# Patient Record
Sex: FEMALE | Race: WHITE | NOT HISPANIC OR LATINO | Employment: UNEMPLOYED | URBAN - METROPOLITAN AREA
[De-identification: names, ages, dates, MRNs, and addresses within clinical notes are randomized per-mention and may not be internally consistent; named-entity substitution may affect disease eponyms.]

---

## 2017-04-30 ENCOUNTER — HOSPITAL ENCOUNTER (INPATIENT)
Age: 67
LOS: 17 days | Discharge: HOME | DRG: 176 | End: 2017-05-17
Attending: EMERGENCY MEDICINE | Admitting: INTERNAL MEDICINE

## 2017-04-30 ENCOUNTER — APPOINTMENT (OUTPATIENT)
Dept: ULTRASOUND IMAGING | Age: 67
DRG: 176 | End: 2017-04-30
Attending: HOSPITALIST

## 2017-04-30 ENCOUNTER — APPOINTMENT (OUTPATIENT)
Dept: CV DIAGNOSTICS | Age: 67
DRG: 176 | End: 2017-04-30
Attending: EMERGENCY MEDICINE

## 2017-04-30 ENCOUNTER — APPOINTMENT (OUTPATIENT)
Dept: CT IMAGING | Age: 67
DRG: 176 | End: 2017-04-30
Attending: EMERGENCY MEDICINE

## 2017-04-30 DIAGNOSIS — I26.99 OTHER PULMONARY EMBOLISM WITHOUT ACUTE COR PULMONALE, UNSPECIFIED CHRONICITY (CMD): Primary | ICD-10-CM

## 2017-04-30 DIAGNOSIS — Z79.01 ANTICOAGULANT LONG-TERM USE: ICD-10-CM

## 2017-04-30 DIAGNOSIS — I27.20 PULMONARY HYPERTENSION (CMD): ICD-10-CM

## 2017-04-30 DIAGNOSIS — R06.02 SOB (SHORTNESS OF BREATH): ICD-10-CM

## 2017-04-30 DIAGNOSIS — E87.20 LACTIC ACIDOSIS: ICD-10-CM

## 2017-04-30 DIAGNOSIS — D64.9 CHRONIC ANEMIA: ICD-10-CM

## 2017-04-30 DIAGNOSIS — I26.09 OTHER ACUTE PULMONARY EMBOLISM WITH ACUTE COR PULMONALE (CMD): ICD-10-CM

## 2017-04-30 DIAGNOSIS — I27.24 CTEPH (CHRONIC THROMBOEMBOLIC PULMONARY HYPERTENSION) (CMD): ICD-10-CM

## 2017-04-30 DIAGNOSIS — R55 SYNCOPE, UNSPECIFIED SYNCOPE TYPE: ICD-10-CM

## 2017-04-30 DIAGNOSIS — D64.9 ANEMIA, UNSPECIFIED TYPE: ICD-10-CM

## 2017-04-30 DIAGNOSIS — I51.7 ENLARGED RV (RIGHT VENTRICLE): ICD-10-CM

## 2017-04-30 DIAGNOSIS — F32.A ANXIETY AND DEPRESSION: ICD-10-CM

## 2017-04-30 DIAGNOSIS — I26.09 OTHER PULMONARY EMBOLISM WITH ACUTE COR PULMONALE (CMD): ICD-10-CM

## 2017-04-30 DIAGNOSIS — Z87.09 HISTORY OF COPD: ICD-10-CM

## 2017-04-30 DIAGNOSIS — F41.9 ANXIETY AND DEPRESSION: ICD-10-CM

## 2017-04-30 DIAGNOSIS — D50.9 IRON DEFICIENCY ANEMIA, UNSPECIFIED IRON DEFICIENCY ANEMIA TYPE: ICD-10-CM

## 2017-04-30 LAB
ALBUMIN SERPL-MCNC: 3.1 G/DL (ref 3.6–5.1)
ALBUMIN/GLOB SERPL: 0.9 {RATIO} (ref 1–2.4)
ALP SERPL-CCNC: 75 UNITS/L (ref 45–117)
ALT SERPL-CCNC: 174 UNITS/L
ANION GAP BLD CALC-SCNC: 18 MMOL/L
ANION GAP SERPL CALC-SCNC: 15 MMOL/L (ref 10–20)
APTT PPP: 157 SEC (ref 22–30)
APTT PPP: <21 SEC (ref 22–30)
AST SERPL-CCNC: 112 UNITS/L
BASE EXCESS BLDV CALC-SCNC: 2 MMOL/L (ref 0–2)
BASOPHILS # BLD AUTO: 0 K/MCL (ref 0–0.3)
BASOPHILS NFR BLD AUTO: 0 %
BILIRUB SERPL-MCNC: 1.8 MG/DL (ref 0.2–1)
BNP SERPL-MCNC: 98 PG/ML
BUN BLD-MCNC: 21 MG/DL (ref 6–20)
BUN SERPL-MCNC: 20 MG/DL (ref 6–20)
BUN/CREAT SERPL: 22 (ref 7–25)
CA-I BLD ISE-SCNC: 1.07 MMOL/L (ref 1.15–1.29)
CALCIUM SERPL-MCNC: 8.2 MG/DL (ref 8.4–10.2)
CHLORIDE BLD-SCNC: 102 MMOL/L (ref 98–107)
CHLORIDE SERPL-SCNC: 103 MMOL/L (ref 98–107)
CHOLEST SERPL-MCNC: 118 MG/DL
CHOLEST/HDLC SERPL: 1.5 {RATIO}
CO2 BLD-SCNC: 23 MMOL/L (ref 19–24)
CO2 SERPL-SCNC: 26 MMOL/L (ref 21–32)
CREAT BLD-MCNC: 0.9 MG/DL (ref 0.51–0.95)
CREAT SERPL-MCNC: 0.92 MG/DL (ref 0.51–0.95)
CROSSMATCH EXPIRE: NORMAL
DIFFERENTIAL METHOD BLD: ABNORMAL
EOSINOPHIL # BLD AUTO: 0.1 K/MCL (ref 0.1–0.5)
EOSINOPHIL NFR SPEC: 1 %
ERYTHROCYTE [DISTWIDTH] IN BLOOD: 14.8 % (ref 11–15)
EST RIGHT VENT SYSTOLIC PRESSURE BY TRICUSPID REGURGITATION JET (RVSP): 83 MMHG
FERRITIN SERPL-MCNC: 19 NG/ML (ref 8–252)
FOLATE SERPL-MCNC: >24 NG/ML
GLOBULIN SER-MCNC: 3.6 G/DL (ref 2–4)
GLUCOSE BLD-MCNC: 151 MG/DL (ref 65–99)
GLUCOSE SERPL-MCNC: 157 MG/DL (ref 65–99)
HCO3 BLDV-SCNC: 25 MMOL/L (ref 22–28)
HCT VFR BLD CALC: 31.5 % (ref 36–46.5)
HCT VFR BLD CALC: 32 % (ref 36–46.5)
HDLC SERPL-MCNC: 80 MG/DL
HGB BLD-MCNC: 10.9 G/DL (ref 12–15.5)
HGB BLD-MCNC: 9.6 G/DL (ref 12–15.5)
IMM RETICS NFR: 32.8 % (ref 1.5–16)
INR PPP: 1
IRON SATN MFR SERPL: 5 % (ref 15–45)
IRON SERPL-MCNC: 18 MCG/DL (ref 50–170)
LACTATE BLD-MCNC: 3.9 MMOL/L
LACTATE SERPL-SCNC: 3.1 MMOL/L (ref 0–2)
LDLC SERPL-MCNC: 16 MG/DL
LYMPHOCYTES # BLD MANUAL: 2.6 K/MCL (ref 1–4)
LYMPHOCYTES NFR BLD MANUAL: 23 %
MCH RBC QN AUTO: 24.7 PG (ref 26–34)
MCHC RBC AUTO-ENTMCNC: 30.5 G/DL (ref 32–36.5)
MCV RBC AUTO: 81 FL (ref 78–100)
MONOCYTES # BLD MANUAL: 0.4 K/MCL (ref 0.3–0.9)
MONOCYTES NFR BLD MANUAL: 3 %
NEUTROPHILS # BLD AUTO: 8.4 K/MCL (ref 1.8–7.7)
NEUTROPHILS NFR BLD AUTO: 73 %
NONHDLC SERPL-MCNC: 38 MG/DL
PCO2 BLDV: 31 MM HG (ref 38–51)
PH BLDV: 7.5 UNITS (ref 7.35–7.45)
PLATELET # BLD: 264 K/MCL (ref 140–450)
PO2 BLDV: 14 MM HG (ref 35–42)
POTASSIUM BLD-SCNC: 3.9 MMOL/L (ref 3.4–5.1)
POTASSIUM SERPL-SCNC: 3.8 MMOL/L (ref 3.4–5.1)
PROT SERPL-MCNC: 6.7 G/DL (ref 6.4–8.2)
PROTHROMBIN TIME: 11.1 SEC (ref 9.7–11.8)
RBC # BLD: 3.89 MIL/MCL (ref 4–5.2)
RETICS #: 85 K/MCL (ref 10–120)
RETICS/RBC NFR AUTO: 2.3 % (ref 0.3–2.5)
RV END SYSTOLIC LONGITUDINAL STRAIN FREE WALL (RVGS): -8.3 %
RV TISSUE DOPPLER FREE WALL HEART RATE (RVSTV): 0.1 M/S
SAO2 % BLDV: 22 % (ref 60–80)
SODIUM BLD-SCNC: 139 MMOL/L (ref 135–145)
SODIUM SERPL-SCNC: 140 MMOL/L (ref 135–145)
TIBC SERPL-MCNC: 395 MCG/DL (ref 250–450)
TRICUSPID VALVE PEAK REGURGITATION VELOCITY (TRPV): 4.1 M/S
TRIGL SERPL-MCNC: 111 MG/DL
TROPONIN I BLD-MCNC: <0.1 NG/ML
TROPONIN I SERPL-MCNC: 0.09 NG/ML
TROPONIN I SERPL-MCNC: 0.32 NG/ML
TSH SERPL-ACNC: 3.15 MCUNITS/ML (ref 0.35–5)
TV ESTIMATED RIGHT ARTERIAL PRESSURE (RAP): 15 MMHG
VIT B12 SERPL-MCNC: 589 PG/ML (ref 211–911)
WBC # BLD: 11.4 K/MCL (ref 4.2–11)

## 2017-04-30 PROCEDURE — 93325 DOPPLER ECHO COLOR FLOW MAPG: CPT | Performed by: INTERNAL MEDICINE

## 2017-04-30 PROCEDURE — 10002805 HB CONTRAST AGENT: Performed by: RADIOLOGY

## 2017-04-30 PROCEDURE — 96365 THER/PROPH/DIAG IV INF INIT: CPT

## 2017-04-30 PROCEDURE — 10002807 HB RX 258: Performed by: RADIOLOGY

## 2017-04-30 PROCEDURE — 70450 CT HEAD/BRAIN W/O DYE: CPT

## 2017-04-30 PROCEDURE — 85730 THROMBOPLASTIN TIME PARTIAL: CPT

## 2017-04-30 PROCEDURE — 83605 ASSAY OF LACTIC ACID: CPT

## 2017-04-30 PROCEDURE — 93308 TTE F-UP OR LMTD: CPT

## 2017-04-30 PROCEDURE — 93970 EXTREMITY STUDY: CPT

## 2017-04-30 PROCEDURE — 99254 IP/OBS CNSLTJ NEW/EST MOD 60: CPT | Performed by: INTERNAL MEDICINE

## 2017-04-30 PROCEDURE — 10002803 HB RX 637: Performed by: INTERNAL MEDICINE

## 2017-04-30 PROCEDURE — 82728 ASSAY OF FERRITIN: CPT

## 2017-04-30 PROCEDURE — 85610 PROTHROMBIN TIME: CPT

## 2017-04-30 PROCEDURE — 99285 EMERGENCY DEPT VISIT HI MDM: CPT

## 2017-04-30 PROCEDURE — 80047 BASIC METABLC PNL IONIZED CA: CPT

## 2017-04-30 PROCEDURE — 93005 ELECTROCARDIOGRAM TRACING: CPT | Performed by: EMERGENCY MEDICINE

## 2017-04-30 PROCEDURE — 84484 ASSAY OF TROPONIN QUANT: CPT

## 2017-04-30 PROCEDURE — 83880 ASSAY OF NATRIURETIC PEPTIDE: CPT

## 2017-04-30 PROCEDURE — 82803 BLOOD GASES ANY COMBINATION: CPT

## 2017-04-30 PROCEDURE — 10004651 HB RX, NO CHARGE ITEM: Performed by: INTERNAL MEDICINE

## 2017-04-30 PROCEDURE — 80061 LIPID PANEL: CPT

## 2017-04-30 PROCEDURE — 87641 MR-STAPH DNA AMP PROBE: CPT

## 2017-04-30 PROCEDURE — 10000008 HB ROOM CHARGE ICU OR CCU

## 2017-04-30 PROCEDURE — 36415 COLL VENOUS BLD VENIPUNCTURE: CPT

## 2017-04-30 PROCEDURE — 85025 COMPLETE CBC W/AUTO DIFF WBC: CPT

## 2017-04-30 PROCEDURE — 71275 CT ANGIOGRAPHY CHEST: CPT

## 2017-04-30 PROCEDURE — 10002803 HB RX 637: Performed by: HOSPITALIST

## 2017-04-30 PROCEDURE — 71275 CT ANGIOGRAPHY CHEST: CPT | Performed by: RADIOLOGY

## 2017-04-30 PROCEDURE — 99291 CRITICAL CARE FIRST HOUR: CPT | Performed by: INTERNAL MEDICINE

## 2017-04-30 PROCEDURE — 93308 TTE F-UP OR LMTD: CPT | Performed by: INTERNAL MEDICINE

## 2017-04-30 PROCEDURE — 80053 COMPREHEN METABOLIC PANEL: CPT

## 2017-04-30 PROCEDURE — 70450 CT HEAD/BRAIN W/O DYE: CPT | Performed by: RADIOLOGY

## 2017-04-30 PROCEDURE — 85046 RETICYTE/HGB CONCENTRATE: CPT

## 2017-04-30 PROCEDURE — 93321 DOPPLER ECHO F-UP/LMTD STD: CPT | Performed by: INTERNAL MEDICINE

## 2017-04-30 PROCEDURE — 93970 EXTREMITY STUDY: CPT | Performed by: RADIOLOGY

## 2017-04-30 PROCEDURE — 10004125 HB COUNTER-FIRST DAY ADMIT

## 2017-04-30 PROCEDURE — 83540 ASSAY OF IRON: CPT

## 2017-04-30 PROCEDURE — 82746 ASSAY OF FOLIC ACID SERUM: CPT

## 2017-04-30 PROCEDURE — 84443 ASSAY THYROID STIM HORMONE: CPT

## 2017-04-30 PROCEDURE — 10002800 HB RX 250 W HCPCS: Performed by: EMERGENCY MEDICINE

## 2017-04-30 RX ORDER — BISACODYL 10 MG
10 SUPPOSITORY, RECTAL RECTAL DAILY PRN
Status: DISCONTINUED | OUTPATIENT
Start: 2017-04-30 | End: 2017-05-17 | Stop reason: HOSPADM

## 2017-04-30 RX ORDER — SODIUM CHLORIDE 9 MG/ML
INJECTION, SOLUTION INTRAVENOUS CONTINUOUS PRN
Status: DISCONTINUED | OUTPATIENT
Start: 2017-04-30 | End: 2017-05-06 | Stop reason: CLARIF

## 2017-04-30 RX ORDER — POTASSIUM CHLORIDE 1.5 G/1.58G
40 POWDER, FOR SOLUTION ORAL EVERY 4 HOURS PRN
Status: DISCONTINUED | OUTPATIENT
Start: 2017-04-30 | End: 2017-05-17 | Stop reason: HOSPADM

## 2017-04-30 RX ORDER — MULTIVITAMIN WITH IRON
1 TABLET ORAL DAILY
COMMUNITY
End: 2017-12-13

## 2017-04-30 RX ORDER — AMOXICILLIN 250 MG
2 CAPSULE ORAL DAILY PRN
Status: DISCONTINUED | OUTPATIENT
Start: 2017-04-30 | End: 2017-05-17 | Stop reason: HOSPADM

## 2017-04-30 RX ORDER — CLOPIDOGREL BISULFATE 75 MG/1
75 TABLET ORAL DAILY
Status: ON HOLD | COMMUNITY
End: 2017-04-30

## 2017-04-30 RX ORDER — PANTOPRAZOLE SODIUM 40 MG/1
40 TABLET, DELAYED RELEASE ORAL NIGHTLY
Status: DISCONTINUED | OUTPATIENT
Start: 2017-04-30 | End: 2017-05-17 | Stop reason: HOSPADM

## 2017-04-30 RX ORDER — POTASSIUM CHLORIDE 14.9 MG/ML
20 INJECTION INTRAVENOUS EVERY 4 HOURS PRN
Status: DISCONTINUED | OUTPATIENT
Start: 2017-04-30 | End: 2017-05-17 | Stop reason: HOSPADM

## 2017-04-30 RX ORDER — OLANZAPINE 2.5 MG/1
0.62 TABLET ORAL NIGHTLY
Status: DISCONTINUED | OUTPATIENT
Start: 2017-04-30 | End: 2017-05-17 | Stop reason: HOSPADM

## 2017-04-30 RX ORDER — CHLORHEXIDINE GLUCONATE ORAL RINSE 1.2 MG/ML
15 SOLUTION DENTAL EVERY 12 HOURS SCHEDULED
Status: DISCONTINUED | OUTPATIENT
Start: 2017-04-30 | End: 2017-05-06 | Stop reason: ALTCHOICE

## 2017-04-30 RX ORDER — ACETAMINOPHEN 325 MG/1
650 TABLET ORAL EVERY 4 HOURS PRN
Status: DISCONTINUED | OUTPATIENT
Start: 2017-04-30 | End: 2017-05-17 | Stop reason: HOSPADM

## 2017-04-30 RX ORDER — HEPARIN SODIUM 1000 [USP'U]/ML
80 INJECTION, SOLUTION INTRAVENOUS; SUBCUTANEOUS PRN
Status: DISCONTINUED | OUTPATIENT
Start: 2017-04-30 | End: 2017-05-01

## 2017-04-30 RX ORDER — OLANZAPINE 2.5 MG/1
0.62 TABLET, FILM COATED ORAL NIGHTLY
COMMUNITY

## 2017-04-30 RX ORDER — ATORVASTATIN CALCIUM 40 MG/1
40 TABLET, FILM COATED ORAL NIGHTLY
Status: DISCONTINUED | OUTPATIENT
Start: 2017-04-30 | End: 2017-04-30

## 2017-04-30 RX ORDER — PREDNISONE 20 MG/1
40-50 TABLET ORAL SEE ADMIN INSTRUCTIONS
Status: ON HOLD | COMMUNITY
Start: 2017-04-18 | End: 2017-05-17 | Stop reason: HOSPADM

## 2017-04-30 RX ORDER — PAROXETINE HYDROCHLORIDE 20 MG/1
20 TABLET, FILM COATED ORAL
Status: DISCONTINUED | OUTPATIENT
Start: 2017-04-30 | End: 2017-05-17 | Stop reason: HOSPADM

## 2017-04-30 RX ORDER — CLONAZEPAM 0.5 MG/1
0.5 TABLET ORAL NIGHTLY
COMMUNITY

## 2017-04-30 RX ORDER — HEPARIN SODIUM AND DEXTROSE 5000; 5 [USP'U]/100ML; G/100ML
INJECTION INTRAVENOUS CONTINUOUS
Status: DISCONTINUED | OUTPATIENT
Start: 2017-04-30 | End: 2017-05-01

## 2017-04-30 RX ORDER — POTASSIUM CHLORIDE 14.9 MG/ML
40 INJECTION INTRAVENOUS EVERY 4 HOURS PRN
Status: DISCONTINUED | OUTPATIENT
Start: 2017-04-30 | End: 2017-05-17 | Stop reason: HOSPADM

## 2017-04-30 RX ORDER — CHLORHEXIDINE GLUCONATE ORAL RINSE 1.2 MG/ML
15 SOLUTION DENTAL PRN
Status: DISCONTINUED | OUTPATIENT
Start: 2017-04-30 | End: 2017-05-06 | Stop reason: ALTCHOICE

## 2017-04-30 RX ORDER — PREDNISONE 50 MG/1
50 TABLET ORAL
Status: DISCONTINUED | OUTPATIENT
Start: 2017-04-30 | End: 2017-04-30

## 2017-04-30 RX ORDER — HEPARIN SODIUM 1000 [USP'U]/ML
80 INJECTION, SOLUTION INTRAVENOUS; SUBCUTANEOUS ONCE
Status: COMPLETED | OUTPATIENT
Start: 2017-04-30 | End: 2017-04-30

## 2017-04-30 RX ORDER — ATORVASTATIN CALCIUM 40 MG/1
40 TABLET, FILM COATED ORAL NIGHTLY
Status: ON HOLD | COMMUNITY
End: 2017-05-17 | Stop reason: HOSPADM

## 2017-04-30 RX ORDER — POTASSIUM CHLORIDE 1.5 G/1.58G
20 POWDER, FOR SOLUTION ORAL EVERY 4 HOURS PRN
Status: DISCONTINUED | OUTPATIENT
Start: 2017-04-30 | End: 2017-05-17 | Stop reason: HOSPADM

## 2017-04-30 RX ORDER — CLONAZEPAM 0.5 MG/1
0.5 TABLET ORAL NIGHTLY
Status: DISCONTINUED | OUTPATIENT
Start: 2017-04-30 | End: 2017-05-17 | Stop reason: HOSPADM

## 2017-04-30 RX ORDER — LISINOPRIL 40 MG/1
40 TABLET ORAL DAILY
Status: ON HOLD | COMMUNITY
End: 2017-04-30

## 2017-04-30 RX ORDER — POTASSIUM CHLORIDE 20 MEQ/1
40 TABLET, EXTENDED RELEASE ORAL EVERY 4 HOURS PRN
Status: DISCONTINUED | OUTPATIENT
Start: 2017-04-30 | End: 2017-05-17 | Stop reason: HOSPADM

## 2017-04-30 RX ORDER — 0.9 % SODIUM CHLORIDE 0.9 %
2 VIAL (ML) INJECTION PRN
Status: DISCONTINUED | OUTPATIENT
Start: 2017-04-30 | End: 2017-05-17 | Stop reason: HOSPADM

## 2017-04-30 RX ORDER — PREDNISONE 20 MG/1
40 TABLET ORAL
Status: DISCONTINUED | OUTPATIENT
Start: 2017-05-02 | End: 2017-04-30

## 2017-04-30 RX ORDER — MINERAL OIL AND WHITE PETROLATUM 150; 830 MG/G; MG/G
1 OINTMENT OPHTHALMIC PRN
Status: DISCONTINUED | OUTPATIENT
Start: 2017-04-30 | End: 2017-05-17 | Stop reason: HOSPADM

## 2017-04-30 RX ORDER — PREDNISONE 20 MG/1
40 TABLET ORAL DAILY
Status: DISCONTINUED | OUTPATIENT
Start: 2017-04-30 | End: 2017-05-03

## 2017-04-30 RX ORDER — POTASSIUM CHLORIDE 20 MEQ/1
20 TABLET, EXTENDED RELEASE ORAL EVERY 4 HOURS PRN
Status: DISCONTINUED | OUTPATIENT
Start: 2017-04-30 | End: 2017-05-17 | Stop reason: HOSPADM

## 2017-04-30 RX ORDER — HEPARIN SODIUM 1000 [USP'U]/ML
40 INJECTION, SOLUTION INTRAVENOUS; SUBCUTANEOUS PRN
Status: DISCONTINUED | OUTPATIENT
Start: 2017-04-30 | End: 2017-05-01

## 2017-04-30 RX ORDER — PAROXETINE HYDROCHLORIDE 20 MG/1
20 TABLET, FILM COATED ORAL
COMMUNITY

## 2017-04-30 RX ORDER — ASCORBIC ACID 500 MG
500 TABLET ORAL DAILY
COMMUNITY
End: 2017-11-20 | Stop reason: ALTCHOICE

## 2017-04-30 RX ORDER — 0.9 % SODIUM CHLORIDE 0.9 %
2 VIAL (ML) INJECTION EVERY 12 HOURS SCHEDULED
Status: DISCONTINUED | OUTPATIENT
Start: 2017-04-30 | End: 2017-05-15 | Stop reason: ALTCHOICE

## 2017-04-30 RX ADMIN — MINERAL OIL AND WHITE PETROLATUM 1 APPLICATION.: 150; 830 OINTMENT OPHTHALMIC at 21:37

## 2017-04-30 RX ADMIN — HEPARIN SODIUM AND DEXTROSE 18 UNITS/KG/HR: 5000; 5 INJECTION INTRAVENOUS at 12:30

## 2017-04-30 RX ADMIN — CLONAZEPAM 0.5 MG: 0.5 TABLET ORAL at 21:37

## 2017-04-30 RX ADMIN — PAROXETINE 20 MG: 20 TABLET, FILM COATED ORAL at 16:28

## 2017-04-30 RX ADMIN — IOPAMIDOL 60 ML: 755 INJECTION, SOLUTION INTRAVENOUS at 11:11

## 2017-04-30 RX ADMIN — PREDNISONE 40 MG: 20 TABLET ORAL at 17:22

## 2017-04-30 RX ADMIN — HEPARIN SODIUM 5800 UNITS: 1000 INJECTION, SOLUTION INTRAVENOUS; SUBCUTANEOUS at 12:30

## 2017-04-30 RX ADMIN — Medication 2 ML: at 21:37

## 2017-04-30 RX ADMIN — SODIUM CHLORIDE 100 ML: 9 INJECTION, SOLUTION INTRAVENOUS at 11:11

## 2017-04-30 RX ADMIN — ASPIRIN 81 MG: 81 TABLET ORAL at 16:28

## 2017-04-30 RX ADMIN — OLANZAPINE 0.62 MG: 2.5 TABLET, FILM COATED ORAL at 21:36

## 2017-04-30 ASSESSMENT — ENCOUNTER SYMPTOMS
CHILLS: 0
APPETITE CHANGE: 0
SHORTNESS OF BREATH: 1
ADENOPATHY: 0
COUGH: 0
SORE THROAT: 0
FEVER: 0
ACTIVITY CHANGE: 0
HEADACHES: 0
NAUSEA: 0
CHEST TIGHTNESS: 0
RHINORRHEA: 0
WEAKNESS: 0
EYE PAIN: 0
FATIGUE: 0
PHOTOPHOBIA: 0
CONSTIPATION: 0
BLOOD IN STOOL: 0
LIGHT-HEADEDNESS: 0
BACK PAIN: 0
ABDOMINAL PAIN: 0
DIARRHEA: 0
VOMITING: 0

## 2017-04-30 ASSESSMENT — ACTIVITIES OF DAILY LIVING (ADL)
ADL_SHORT_OF_BREATH: YES
ADL_SCORE: 12
ADL_BEFORE_ADMISSION: INDEPENDENT
CHRONIC_PAIN_PRESENT: NO
RECENT_DECLINE_ADL: NO

## 2017-04-30 ASSESSMENT — PAIN SCALES - GENERAL
PAINLEVEL_OUTOF10: 0
PAIN_LEVEL_AT_REST: 0
PAIN_LEVEL_WITH_ACTIVITY: 0
PAIN_LEVEL_AT_REST: 0
PAINLEVEL_OUTOF10: 0
PAIN_LEVEL_WITH_ACTIVITY: 0
PAINLEVEL_OUTOF10: 0

## 2017-04-30 ASSESSMENT — COGNITIVE AND FUNCTIONAL STATUS - GENERAL
ARE YOU DEAF OR DO YOU HAVE SERIOUS DIFFICULTY  HEARING: NO
ARE YOU BLIND OR DO YOU HAVE SERIOUS DIFFICULTY SEEING, EVEN WHEN WEARING GLASSES: NO

## 2017-04-30 ASSESSMENT — LIFESTYLE VARIABLES
E-CIGARETTE_USE: NO E-CIGARETTES USE IN THE LAST 30 DAYS
HOW OFTEN DO YOU HAVE A DRINK CONTAINING ALCOHOL: NEVER
AUDIT-C TOTAL SCORE: 0
HOW OFTEN DO YOU HAVE 6 OR MORE DRINKS ON ONE OCCASION: NEVER
ALCOHOL_USE_STATUS: NO OR LOW RISK WITH VALIDATED TOOL
HOW MANY STANDARD DRINKS CONTAINING ALCOHOL DO YOU HAVE ON A TYPICAL DAY: 0,1 OR 2
TOBACCO_USE_STATUS_IN_THE_LAST_30_DAYS: NO TOBACCO USED IN THE LAST 30 DAYS

## 2017-05-01 ENCOUNTER — APPOINTMENT (OUTPATIENT)
Dept: OTHER | Age: 67
DRG: 176 | End: 2017-05-01
Attending: NURSE PRACTITIONER

## 2017-05-01 LAB
ALBUMIN SERPL-MCNC: 2.7 G/DL (ref 3.6–5.1)
ALBUMIN/GLOB SERPL: 0.8 {RATIO} (ref 1–2.4)
ALP SERPL-CCNC: 67 UNITS/L (ref 45–117)
ALT SERPL-CCNC: 130 UNITS/L
ANION GAP SERPL CALC-SCNC: 13 MMOL/L (ref 10–20)
APTT PPP: 111 SEC (ref 22–30)
APTT PPP: 55 SEC (ref 22–30)
APTT PPP: 97 SEC (ref 22–30)
AST SERPL-CCNC: 53 UNITS/L
ATRIAL RATE (BPM): 107
BASOPHILS # BLD AUTO: 0 K/MCL (ref 0–0.3)
BASOPHILS NFR BLD AUTO: 0 %
BILIRUB SERPL-MCNC: 1.6 MG/DL (ref 0.2–1)
BUN SERPL-MCNC: 19 MG/DL (ref 6–20)
BUN/CREAT SERPL: 25 (ref 7–25)
CALCIUM SERPL-MCNC: 8.2 MG/DL (ref 8.4–10.2)
CHLORIDE SERPL-SCNC: 104 MMOL/L (ref 98–107)
CO2 SERPL-SCNC: 24 MMOL/L (ref 21–32)
CREAT SERPL-MCNC: 0.77 MG/DL (ref 0.51–0.95)
CRP SERPL-MCNC: 1.3 MG/DL
DIASTOLIC BLOOD PRESSURE: 70
DIFFERENTIAL METHOD BLD: ABNORMAL
EOSINOPHIL # BLD AUTO: 0 K/MCL (ref 0.1–0.5)
EOSINOPHIL NFR SPEC: 0 %
ERYTHROCYTE [DISTWIDTH] IN BLOOD: 14.9 % (ref 11–15)
ERYTHROCYTE [SEDIMENTATION RATE] IN BLOOD: 4 MM/HR (ref 0–20)
GLOBULIN SER-MCNC: 3.4 G/DL (ref 2–4)
GLUCOSE BLDC GLUCOMTR-MCNC: 125 MG/DL (ref 65–99)
GLUCOSE BLDC GLUCOMTR-MCNC: 145 MG/DL (ref 65–99)
GLUCOSE BLDC GLUCOMTR-MCNC: 263 MG/DL (ref 65–99)
GLUCOSE BLDC GLUCOMTR-MCNC: 270 MG/DL (ref 65–99)
GLUCOSE SERPL-MCNC: 212 MG/DL (ref 65–99)
HCT VFR BLD CALC: 28.9 % (ref 36–46.5)
HGB BLD-MCNC: 9 G/DL (ref 12–15.5)
LYMPHOCYTES # BLD MANUAL: 1.1 K/MCL (ref 1–4)
LYMPHOCYTES NFR BLD MANUAL: 17 %
MAGNESIUM SERPL-MCNC: 2 MG/DL (ref 1.7–2.4)
MCH RBC QN AUTO: 24.3 PG (ref 26–34)
MCHC RBC AUTO-ENTMCNC: 31.1 G/DL (ref 32–36.5)
MCV RBC AUTO: 78.1 FL (ref 78–100)
MONOCYTES # BLD MANUAL: 0.2 K/MCL (ref 0.3–0.9)
MONOCYTES NFR BLD MANUAL: 4 %
MRSA DNA SPEC QL NAA+PROBE: NOT DETECTED
NEUTROPHILS # BLD AUTO: 5.2 K/MCL (ref 1.8–7.7)
NEUTROPHILS NFR BLD AUTO: 79 %
P AXIS (DEGREES): 54
PLATELET # BLD: 250 K/MCL (ref 140–450)
POTASSIUM SERPL-SCNC: 4.1 MMOL/L (ref 3.4–5.1)
PR-INTERVAL (MSEC): 122
PROT SERPL-MCNC: 6.1 G/DL (ref 6.4–8.2)
QRS-INTERVAL (MSEC): 66
QT-INTERVAL (MSEC): 358
QTC: 477
R AXIS (DEGREES): 71
RBC # BLD: 3.7 MIL/MCL (ref 4–5.2)
REPORT TEXT: NORMAL
SODIUM SERPL-SCNC: 137 MMOL/L (ref 135–145)
SPECIMEN SOURCE: NORMAL
SYSTOLIC BLOOD PRESSURE: 116
T AXIS (DEGREES): -85
TROPONIN I SERPL-MCNC: 0.21 NG/ML
VENTRICULAR RATE EKG/MIN (BPM): 107
WBC # BLD: 6.6 K/MCL (ref 4.2–11)

## 2017-05-01 PROCEDURE — 84484 ASSAY OF TROPONIN QUANT: CPT

## 2017-05-01 PROCEDURE — 80053 COMPREHEN METABOLIC PANEL: CPT

## 2017-05-01 PROCEDURE — 85390 FIBRINOLYSINS SCREEN I&R: CPT

## 2017-05-01 PROCEDURE — 85652 RBC SED RATE AUTOMATED: CPT

## 2017-05-01 PROCEDURE — 36569 INSJ PICC 5 YR+ W/O IMAGING: CPT

## 2017-05-01 PROCEDURE — 10000008 HB ROOM CHARGE ICU OR CCU

## 2017-05-01 PROCEDURE — 99255 IP/OBS CONSLTJ NEW/EST HI 80: CPT | Performed by: INTERNAL MEDICINE

## 2017-05-01 PROCEDURE — 85730 THROMBOPLASTIN TIME PARTIAL: CPT

## 2017-05-01 PROCEDURE — 10002803 HB RX 637: Performed by: INTERNAL MEDICINE

## 2017-05-01 PROCEDURE — 10002800 HB RX 250 W HCPCS: Performed by: NURSE PRACTITIONER

## 2017-05-01 PROCEDURE — 10002800 HB RX 250 W HCPCS: Performed by: INTERNAL MEDICINE

## 2017-05-01 PROCEDURE — 10004651 HB RX, NO CHARGE ITEM: Performed by: INTERNAL MEDICINE

## 2017-05-01 PROCEDURE — 99254 IP/OBS CNSLTJ NEW/EST MOD 60: CPT | Performed by: INTERNAL MEDICINE

## 2017-05-01 PROCEDURE — 83735 ASSAY OF MAGNESIUM: CPT

## 2017-05-01 PROCEDURE — 10002803 HB RX 637: Performed by: HOSPITALIST

## 2017-05-01 PROCEDURE — 82962 GLUCOSE BLOOD TEST: CPT

## 2017-05-01 PROCEDURE — 99232 SBSQ HOSP IP/OBS MODERATE 35: CPT | Performed by: INTERNAL MEDICINE

## 2017-05-01 PROCEDURE — 85520 HEPARIN ASSAY: CPT

## 2017-05-01 PROCEDURE — 10004651 HB RX, NO CHARGE ITEM: Performed by: NURSE PRACTITIONER

## 2017-05-01 PROCEDURE — 86140 C-REACTIVE PROTEIN: CPT

## 2017-05-01 PROCEDURE — 85025 COMPLETE CBC W/AUTO DIFF WBC: CPT

## 2017-05-01 PROCEDURE — 99233 SBSQ HOSP IP/OBS HIGH 50: CPT | Performed by: INTERNAL MEDICINE

## 2017-05-01 PROCEDURE — 36415 COLL VENOUS BLD VENIPUNCTURE: CPT

## 2017-05-01 PROCEDURE — 85670 THROMBIN TIME PLASMA: CPT

## 2017-05-01 RX ORDER — HEPARIN SODIUM AND DEXTROSE 5000; 5 [USP'U]/100ML; G/100ML
INJECTION INTRAVENOUS CONTINUOUS
Status: DISCONTINUED | OUTPATIENT
Start: 2017-05-01 | End: 2017-05-16

## 2017-05-01 RX ORDER — NICOTINE POLACRILEX 4 MG
15 LOZENGE BUCCAL PRN
Status: DISCONTINUED | OUTPATIENT
Start: 2017-05-01 | End: 2017-05-17 | Stop reason: HOSPADM

## 2017-05-01 RX ORDER — HEPARIN SODIUM 1000 [USP'U]/ML
80 INJECTION, SOLUTION INTRAVENOUS; SUBCUTANEOUS ONCE
Status: DISCONTINUED | OUTPATIENT
Start: 2017-05-01 | End: 2017-05-01

## 2017-05-01 RX ORDER — DEXTROSE MONOHYDRATE 50 MG/ML
INJECTION, SOLUTION INTRAVENOUS CONTINUOUS PRN
Status: DISCONTINUED | OUTPATIENT
Start: 2017-05-01 | End: 2017-05-17 | Stop reason: HOSPADM

## 2017-05-01 RX ORDER — DEXTROSE MONOHYDRATE 25 G/50ML
25 INJECTION, SOLUTION INTRAVENOUS PRN
Status: DISCONTINUED | OUTPATIENT
Start: 2017-05-01 | End: 2017-05-17 | Stop reason: HOSPADM

## 2017-05-01 RX ORDER — HEPARIN SODIUM 1000 [USP'U]/ML
80 INJECTION, SOLUTION INTRAVENOUS; SUBCUTANEOUS PRN
Status: DISCONTINUED | OUTPATIENT
Start: 2017-05-01 | End: 2017-05-16

## 2017-05-01 RX ORDER — HEPARIN SODIUM 1000 [USP'U]/ML
40 INJECTION, SOLUTION INTRAVENOUS; SUBCUTANEOUS PRN
Status: DISCONTINUED | OUTPATIENT
Start: 2017-05-01 | End: 2017-05-16

## 2017-05-01 RX ORDER — DOBUTAMINE HYDROCHLORIDE 200 MG/100ML
INJECTION INTRAVENOUS CONTINUOUS
Status: DISCONTINUED | OUTPATIENT
Start: 2017-05-01 | End: 2017-05-01

## 2017-05-01 RX ADMIN — OLANZAPINE 0.62 MG: 2.5 TABLET, FILM COATED ORAL at 22:57

## 2017-05-01 RX ADMIN — ASPIRIN 81 MG: 81 TABLET ORAL at 12:38

## 2017-05-01 RX ADMIN — MINERAL OIL AND WHITE PETROLATUM 1 APPLICATION.: 150; 830 OINTMENT OPHTHALMIC at 23:23

## 2017-05-01 RX ADMIN — INSULIN LISPRO 6 UNITS: 100 INJECTION, SOLUTION INTRAVENOUS; SUBCUTANEOUS at 22:56

## 2017-05-01 RX ADMIN — PAROXETINE 20 MG: 20 TABLET, FILM COATED ORAL at 12:38

## 2017-05-01 RX ADMIN — PREDNISONE 40 MG: 20 TABLET ORAL at 12:38

## 2017-05-01 RX ADMIN — Medication 10 ML: at 21:42

## 2017-05-01 RX ADMIN — Medication 10 ML: at 17:41

## 2017-05-01 RX ADMIN — EPOPROSTENOL 0.5 NG/KG/MIN: 0.5 INJECTION, POWDER, LYOPHILIZED, FOR SOLUTION INTRAVENOUS at 16:13

## 2017-05-01 RX ADMIN — CHLORHEXIDINE GLUCONATE 15 ML: 1.2 RINSE ORAL at 21:46

## 2017-05-01 RX ADMIN — CLONAZEPAM 0.5 MG: 0.5 TABLET ORAL at 22:57

## 2017-05-01 RX ADMIN — HEPARIN SODIUM AND DEXTROSE 12 UNITS/KG/HR: 5000; 5 INJECTION INTRAVENOUS at 15:21

## 2017-05-01 RX ADMIN — IRON SUCROSE 200 MG: 20 INJECTION, SOLUTION INTRAVENOUS at 16:37

## 2017-05-01 RX ADMIN — Medication 2 ML: at 21:42

## 2017-05-01 RX ADMIN — Medication 2 ML: at 08:51

## 2017-05-01 ASSESSMENT — PAIN SCALES - GENERAL
PAIN_LEVEL_WITH_ACTIVITY: 0
PAIN_LEVEL_WITH_ACTIVITY: 0
PAIN_LEVEL_AT_REST: 0
PAIN_LEVEL_WITH_ACTIVITY: 0
PAIN_LEVEL_AT_REST: 0
PAIN_LEVEL_WITH_ACTIVITY: 0
PAIN_LEVEL_AT_REST: 0
PAIN_LEVEL_WITH_ACTIVITY: 0
PAIN_LEVEL_AT_REST: 0

## 2017-05-02 LAB
ALBUMIN SERPL-MCNC: 2.6 G/DL (ref 3.6–5.1)
ALBUMIN/GLOB SERPL: 0.8 {RATIO} (ref 1–2.4)
ALP SERPL-CCNC: 60 UNITS/L (ref 45–117)
ALT SERPL-CCNC: 111 UNITS/L
ANA SER QL IA: NEGATIVE
ANION GAP SERPL CALC-SCNC: 9 MMOL/L (ref 10–20)
APTT INHIB SENS PPP: 21.5 SEC (ref 22–30)
APTT PPP: 46 SEC (ref 22–30)
APTT PPP: 57 SEC (ref 22–30)
AST SERPL-CCNC: 42 UNITS/L
BASOPHILS # BLD AUTO: 0 K/MCL (ref 0–0.3)
BASOPHILS NFR BLD AUTO: 0 %
BILIRUB SERPL-MCNC: 0.9 MG/DL (ref 0.2–1)
BUN SERPL-MCNC: 16 MG/DL (ref 6–20)
BUN/CREAT SERPL: 22 (ref 7–25)
CALCIUM SERPL-MCNC: 7.9 MG/DL (ref 8.4–10.2)
CHLORIDE SERPL-SCNC: 105 MMOL/L (ref 98–107)
CO2 SERPL-SCNC: 28 MMOL/L (ref 21–32)
CREAT SERPL-MCNC: 0.72 MG/DL (ref 0.51–0.95)
DIFFERENTIAL METHOD BLD: ABNORMAL
EOSINOPHIL # BLD AUTO: 0 K/MCL (ref 0.1–0.5)
EOSINOPHIL NFR SPEC: 0 %
ERYTHROCYTE [DISTWIDTH] IN BLOOD: 14.8 % (ref 11–15)
GLOBULIN SER-MCNC: 3.4 G/DL (ref 2–4)
GLUCOSE BLDC GLUCOMTR-MCNC: 116 MG/DL (ref 65–99)
GLUCOSE BLDC GLUCOMTR-MCNC: 175 MG/DL (ref 65–99)
GLUCOSE BLDC GLUCOMTR-MCNC: 208 MG/DL (ref 65–99)
GLUCOSE BLDC GLUCOMTR-MCNC: 229 MG/DL (ref 65–99)
GLUCOSE BLDC GLUCOMTR-MCNC: 340 MG/DL (ref 65–99)
GLUCOSE SERPL-MCNC: 169 MG/DL (ref 65–99)
HBA1C MFR BLD: 7.3 % (ref 4.5–5.6)
HCT VFR BLD CALC: 26.3 % (ref 36–46.5)
HGB BLD-MCNC: 8.2 G/DL (ref 12–15.5)
HIV 1+2 AB+HIV1 P24 AG SERPL QL IA: NONREACTIVE
LA 3 SCREEN W REFLEX-IMP: ABNORMAL
LYMPHOCYTES # BLD MANUAL: 1.3 K/MCL (ref 1–4)
LYMPHOCYTES NFR BLD MANUAL: 17 %
MCH RBC QN AUTO: 24.6 PG (ref 26–34)
MCHC RBC AUTO-ENTMCNC: 31.2 G/DL (ref 32–36.5)
MCV RBC AUTO: 79 FL (ref 78–100)
MIXING APTT: 22.5 SEC (ref 22–30)
MIXING DRVVT: 39.5 SEC
MONOCYTES # BLD MANUAL: 0.4 K/MCL (ref 0.3–0.9)
MONOCYTES NFR BLD MANUAL: 6 %
NEUTROPHILS # BLD AUTO: 6 K/MCL (ref 1.8–7.7)
NEUTROPHILS NFR BLD AUTO: 77 %
PATHOLOGIST NAME: ABNORMAL
PLATELET # BLD: 231 K/MCL (ref 140–450)
POTASSIUM SERPL-SCNC: 4 MMOL/L (ref 3.4–5.1)
PROT SERPL-MCNC: 6 G/DL (ref 6.4–8.2)
RBC # BLD: 3.33 MIL/MCL (ref 4–5.2)
RHEUMATOID FACT SERPL-ACNC: <10 UNITS/ML
SCREEN DRVVT: 41.6 SEC
SODIUM SERPL-SCNC: 138 MMOL/L (ref 135–145)
THROMBIN TIME: ABNORMAL SEC (ref 15.3–21.1)
TSH SERPL-ACNC: 0.4 MCUNITS/ML (ref 0.35–5)
UFH PPP CHRO-ACNC: 1.42 UNIT/ML
WBC # BLD: 7.7 K/MCL (ref 4.2–11)

## 2017-05-02 PROCEDURE — 10002800 HB RX 250 W HCPCS: Performed by: NURSE PRACTITIONER

## 2017-05-02 PROCEDURE — 82962 GLUCOSE BLOOD TEST: CPT

## 2017-05-02 PROCEDURE — 99233 SBSQ HOSP IP/OBS HIGH 50: CPT | Performed by: INTERNAL MEDICINE

## 2017-05-02 PROCEDURE — 10004651 HB RX, NO CHARGE ITEM: Performed by: INTERNAL MEDICINE

## 2017-05-02 PROCEDURE — 10002803 HB RX 637: Performed by: INTERNAL MEDICINE

## 2017-05-02 PROCEDURE — 86431 RHEUMATOID FACTOR QUANT: CPT

## 2017-05-02 PROCEDURE — 36415 COLL VENOUS BLD VENIPUNCTURE: CPT

## 2017-05-02 PROCEDURE — 87389 HIV-1 AG W/HIV-1&-2 AB AG IA: CPT

## 2017-05-02 PROCEDURE — 10003445 HB TELEMETRY PER DAY

## 2017-05-02 PROCEDURE — 86038 ANTINUCLEAR ANTIBODIES: CPT

## 2017-05-02 PROCEDURE — 10004651 HB RX, NO CHARGE ITEM: Performed by: NURSE PRACTITIONER

## 2017-05-02 PROCEDURE — 83036 HEMOGLOBIN GLYCOSYLATED A1C: CPT

## 2017-05-02 PROCEDURE — 84443 ASSAY THYROID STIM HORMONE: CPT

## 2017-05-02 PROCEDURE — 85025 COMPLETE CBC W/AUTO DIFF WBC: CPT

## 2017-05-02 PROCEDURE — 10002803 HB RX 637: Performed by: HOSPITALIST

## 2017-05-02 PROCEDURE — 99231 SBSQ HOSP IP/OBS SF/LOW 25: CPT | Performed by: INTERNAL MEDICINE

## 2017-05-02 PROCEDURE — 86147 CARDIOLIPIN ANTIBODY EA IG: CPT

## 2017-05-02 PROCEDURE — 80053 COMPREHEN METABOLIC PANEL: CPT

## 2017-05-02 PROCEDURE — 85730 THROMBOPLASTIN TIME PARTIAL: CPT

## 2017-05-02 PROCEDURE — 10002800 HB RX 250 W HCPCS: Performed by: INTERNAL MEDICINE

## 2017-05-02 PROCEDURE — 10000002 HB ROOM CHARGE MED SURG

## 2017-05-02 RX ADMIN — Medication 10 ML: at 06:24

## 2017-05-02 RX ADMIN — INSULIN LISPRO 2 UNITS: 100 INJECTION, SOLUTION INTRAVENOUS; SUBCUTANEOUS at 12:47

## 2017-05-02 RX ADMIN — IRON SUCROSE 200 MG: 20 INJECTION, SOLUTION INTRAVENOUS at 08:36

## 2017-05-02 RX ADMIN — Medication 2 ML: at 21:32

## 2017-05-02 RX ADMIN — HEPARIN SODIUM AND DEXTROSE 9 UNITS/KG/HR: 5000; 5 INJECTION INTRAVENOUS at 21:35

## 2017-05-02 RX ADMIN — CHLORHEXIDINE GLUCONATE 15 ML: 1.2 RINSE ORAL at 21:33

## 2017-05-02 RX ADMIN — CLONAZEPAM 0.5 MG: 0.5 TABLET ORAL at 21:26

## 2017-05-02 RX ADMIN — PREDNISONE 40 MG: 20 TABLET ORAL at 08:36

## 2017-05-02 RX ADMIN — Medication 10 ML: at 21:33

## 2017-05-02 RX ADMIN — PAROXETINE 20 MG: 20 TABLET, FILM COATED ORAL at 12:46

## 2017-05-02 RX ADMIN — EPOPROSTENOL 1.5 NG/KG/MIN: 0.5 INJECTION, POWDER, LYOPHILIZED, FOR SOLUTION INTRAVENOUS at 16:08

## 2017-05-02 RX ADMIN — MINERAL OIL AND WHITE PETROLATUM 1 APPLICATION.: 150; 830 OINTMENT OPHTHALMIC at 22:16

## 2017-05-02 RX ADMIN — INSULIN LISPRO 4 UNITS: 100 INJECTION, SOLUTION INTRAVENOUS; SUBCUTANEOUS at 18:37

## 2017-05-02 RX ADMIN — OLANZAPINE 0.62 MG: 2.5 TABLET, FILM COATED ORAL at 21:26

## 2017-05-02 RX ADMIN — ASPIRIN 81 MG: 81 TABLET ORAL at 12:46

## 2017-05-02 ASSESSMENT — PAIN SCALES - GENERAL
PAIN_LEVEL_AT_REST: 0

## 2017-05-03 PROBLEM — D64.9 ANEMIA: Status: ACTIVE | Noted: 2017-05-03

## 2017-05-03 PROBLEM — I26.99 PULMONARY EMBOLUS (CMD): Status: ACTIVE | Noted: 2017-05-03

## 2017-05-03 PROBLEM — I27.20 PULMONARY HYPERTENSION (CMD): Status: ACTIVE | Noted: 2017-05-03

## 2017-05-03 LAB
APTT PPP: 49 SEC (ref 22–30)
CARDIOLIPIN IGA SER IA-ACNC: <20 APL
CARDIOLIPIN IGG SER IA-ACNC: <20 GPL
CARDIOLIPIN IGM SER IA-ACNC: <20 MPL
ERYTHROCYTE [DISTWIDTH] IN BLOOD: 14.9 % (ref 11–15)
GLUCOSE BLDC GLUCOMTR-MCNC: 168 MG/DL (ref 65–99)
GLUCOSE BLDC GLUCOMTR-MCNC: 206 MG/DL (ref 65–99)
GLUCOSE BLDC GLUCOMTR-MCNC: 89 MG/DL (ref 65–99)
HCT VFR BLD CALC: 27.4 % (ref 36–46.5)
HGB BLD-MCNC: 8.3 G/DL (ref 12–15.5)
INR PPP: 1.1
MCH RBC QN AUTO: 24.6 PG (ref 26–34)
MCHC RBC AUTO-ENTMCNC: 30.3 G/DL (ref 32–36.5)
MCV RBC AUTO: 81.3 FL (ref 78–100)
PLATELET # BLD: 242 K/MCL (ref 140–450)
PROTHROMBIN TIME: 11.6 SEC (ref 9.7–11.8)
RBC # BLD: 3.37 MIL/MCL (ref 4–5.2)
WBC # BLD: 8.6 K/MCL (ref 4.2–11)

## 2017-05-03 PROCEDURE — 97166 OT EVAL MOD COMPLEX 45 MIN: CPT | Performed by: OCCUPATIONAL THERAPIST

## 2017-05-03 PROCEDURE — 97530 THERAPEUTIC ACTIVITIES: CPT | Performed by: OCCUPATIONAL THERAPIST

## 2017-05-03 PROCEDURE — 10002800 HB RX 250 W HCPCS: Performed by: NURSE PRACTITIONER

## 2017-05-03 PROCEDURE — 82962 GLUCOSE BLOOD TEST: CPT

## 2017-05-03 PROCEDURE — 10002800 HB RX 250 W HCPCS: Performed by: INTERNAL MEDICINE

## 2017-05-03 PROCEDURE — 10004172 HB COUNTER-THERAPY VISIT OT: Performed by: OCCUPATIONAL THERAPIST

## 2017-05-03 PROCEDURE — 99232 SBSQ HOSP IP/OBS MODERATE 35: CPT | Performed by: HOSPITALIST

## 2017-05-03 PROCEDURE — 97530 THERAPEUTIC ACTIVITIES: CPT

## 2017-05-03 PROCEDURE — 99232 SBSQ HOSP IP/OBS MODERATE 35: CPT | Performed by: INTERNAL MEDICINE

## 2017-05-03 PROCEDURE — 10004173 HB COUNTER-THERAPY VISIT PT

## 2017-05-03 PROCEDURE — 10002803 HB RX 637: Performed by: INTERNAL MEDICINE

## 2017-05-03 PROCEDURE — 97162 PT EVAL MOD COMPLEX 30 MIN: CPT

## 2017-05-03 PROCEDURE — 85610 PROTHROMBIN TIME: CPT

## 2017-05-03 PROCEDURE — 10000002 HB ROOM CHARGE MED SURG

## 2017-05-03 PROCEDURE — 10003445 HB TELEMETRY PER DAY

## 2017-05-03 PROCEDURE — 97116 GAIT TRAINING THERAPY: CPT

## 2017-05-03 PROCEDURE — 85730 THROMBOPLASTIN TIME PARTIAL: CPT

## 2017-05-03 PROCEDURE — 10002803 HB RX 637: Performed by: HOSPITALIST

## 2017-05-03 PROCEDURE — 10004651 HB RX, NO CHARGE ITEM: Performed by: INTERNAL MEDICINE

## 2017-05-03 PROCEDURE — 85027 COMPLETE CBC AUTOMATED: CPT

## 2017-05-03 PROCEDURE — 10004651 HB RX, NO CHARGE ITEM: Performed by: NURSE PRACTITIONER

## 2017-05-03 RX ORDER — WARFARIN SODIUM 5 MG/1
5 TABLET ORAL ONCE
Status: COMPLETED | OUTPATIENT
Start: 2017-05-03 | End: 2017-05-03

## 2017-05-03 RX ADMIN — OLANZAPINE 0.62 MG: 2.5 TABLET, FILM COATED ORAL at 21:09

## 2017-05-03 RX ADMIN — PAROXETINE 20 MG: 20 TABLET, FILM COATED ORAL at 13:03

## 2017-05-03 RX ADMIN — MINERAL OIL AND WHITE PETROLATUM 1 APPLICATION.: 150; 830 OINTMENT OPHTHALMIC at 21:45

## 2017-05-03 RX ADMIN — Medication 10 ML: at 04:28

## 2017-05-03 RX ADMIN — CLONAZEPAM 0.5 MG: 0.5 TABLET ORAL at 21:09

## 2017-05-03 RX ADMIN — CHLORHEXIDINE GLUCONATE 15 ML: 1.2 RINSE ORAL at 10:19

## 2017-05-03 RX ADMIN — PREDNISONE 30 MG: 10 TABLET ORAL at 10:18

## 2017-05-03 RX ADMIN — Medication 2 ML: at 21:05

## 2017-05-03 RX ADMIN — ASPIRIN 81 MG: 81 TABLET ORAL at 13:03

## 2017-05-03 RX ADMIN — INSULIN LISPRO 4 UNITS: 100 INJECTION, SOLUTION INTRAVENOUS; SUBCUTANEOUS at 17:42

## 2017-05-03 RX ADMIN — Medication 10 ML: at 13:03

## 2017-05-03 RX ADMIN — HEPARIN SODIUM AND DEXTROSE 9 UNITS/KG/HR: 5000; 5 INJECTION INTRAVENOUS at 21:07

## 2017-05-03 RX ADMIN — IRON SUCROSE 200 MG: 20 INJECTION, SOLUTION INTRAVENOUS at 10:19

## 2017-05-03 RX ADMIN — WARFARIN SODIUM 5 MG: 5 TABLET ORAL at 19:40

## 2017-05-03 RX ADMIN — INSULIN LISPRO 2 UNITS: 100 INJECTION, SOLUTION INTRAVENOUS; SUBCUTANEOUS at 13:23

## 2017-05-03 RX ADMIN — Medication 2 ML: at 10:19

## 2017-05-03 RX ADMIN — Medication 10 ML: at 21:05

## 2017-05-03 RX ADMIN — EPOPROSTENOL 2.5 NG/KG/MIN: 0.5 INJECTION, POWDER, LYOPHILIZED, FOR SOLUTION INTRAVENOUS at 16:09

## 2017-05-03 ASSESSMENT — PAIN SCALES - GENERAL
PAIN_LEVEL_AT_REST: 0

## 2017-05-03 ASSESSMENT — ACTIVITIES OF DAILY LIVING (ADL)
FEEDING: INDEPENDENT
PRIOR_ADL: INDEPENDENT
TOILETING: NEEDS ASSISTANCE
ADL_SCORE: 7
DRESSING: NEEDS ASSISTANCE
BATHING: NEEDS ASSISTANCE

## 2017-05-04 ENCOUNTER — SURGERY (OUTPATIENT)
Age: 67
End: 2017-05-04

## 2017-05-04 LAB
APTT PPP: 44 SEC (ref 22–30)
APTT PPP: 53 SEC (ref 22–30)
APTT PPP: 53 SEC (ref 22–30)
ERYTHROCYTE [DISTWIDTH] IN BLOOD: 15.3 % (ref 11–15)
GLUCOSE BLDC GLUCOMTR-MCNC: 225 MG/DL (ref 65–99)
GLUCOSE BLDC GLUCOMTR-MCNC: 245 MG/DL (ref 65–99)
GLUCOSE BLDC GLUCOMTR-MCNC: 266 MG/DL (ref 65–99)
GLUCOSE BLDC GLUCOMTR-MCNC: 80 MG/DL (ref 65–99)
GLUCOSE BLDC GLUCOMTR-MCNC: 93 MG/DL (ref 65–99)
HCT VFR BLD CALC: 28.4 % (ref 36–46.5)
HGB BLD-MCNC: 8.6 G/DL (ref 12–15.5)
INR PPP: 1
MCH RBC QN AUTO: 25 PG (ref 26–34)
MCHC RBC AUTO-ENTMCNC: 30.3 G/DL (ref 32–36.5)
MCV RBC AUTO: 82.6 FL (ref 78–100)
PLATELET # BLD: 246 K/MCL (ref 140–450)
PROTHROMBIN TIME: 11.2 SEC (ref 9.7–11.8)
RBC # BLD: 3.44 MIL/MCL (ref 4–5.2)
WBC # BLD: 8.9 K/MCL (ref 4.2–11)

## 2017-05-04 PROCEDURE — 97535 SELF CARE MNGMENT TRAINING: CPT

## 2017-05-04 PROCEDURE — 10002803 HB RX 637: Performed by: INTERNAL MEDICINE

## 2017-05-04 PROCEDURE — 10002800 HB RX 250 W HCPCS: Performed by: INTERNAL MEDICINE

## 2017-05-04 PROCEDURE — 85610 PROTHROMBIN TIME: CPT

## 2017-05-04 PROCEDURE — 10002801 HB RX 250 W/O HCPCS: Performed by: INTERNAL MEDICINE

## 2017-05-04 PROCEDURE — 10004402 HB ULTRASOUND S&I: Performed by: INTERNAL MEDICINE

## 2017-05-04 PROCEDURE — 85730 THROMBOPLASTIN TIME PARTIAL: CPT

## 2017-05-04 PROCEDURE — 10000002 HB ROOM CHARGE MED SURG

## 2017-05-04 PROCEDURE — 99232 SBSQ HOSP IP/OBS MODERATE 35: CPT | Performed by: HOSPITALIST

## 2017-05-04 PROCEDURE — 4A023N6 MEASUREMENT OF CARDIAC SAMPLING AND PRESSURE, RIGHT HEART, PERCUTANEOUS APPROACH: ICD-10-PCS | Performed by: INTERNAL MEDICINE

## 2017-05-04 PROCEDURE — 10004351 HB COUNTER-CATH LAB CASE: Performed by: INTERNAL MEDICINE

## 2017-05-04 PROCEDURE — 10002803 HB RX 637: Performed by: HOSPITALIST

## 2017-05-04 PROCEDURE — 99233 SBSQ HOSP IP/OBS HIGH 50: CPT | Performed by: INTERNAL MEDICINE

## 2017-05-04 PROCEDURE — 10003445 HB TELEMETRY PER DAY

## 2017-05-04 PROCEDURE — 10004651 HB RX, NO CHARGE ITEM: Performed by: INTERNAL MEDICINE

## 2017-05-04 PROCEDURE — 10004370 HB CARDIAC CATH: Performed by: INTERNAL MEDICINE

## 2017-05-04 PROCEDURE — 10003056 HB DISPOSABLE INSTRUMENT/SUPPLY 1: Performed by: INTERNAL MEDICINE

## 2017-05-04 PROCEDURE — 10004172 HB COUNTER-THERAPY VISIT OT

## 2017-05-04 PROCEDURE — 4A0239Z MEASUREMENT OF CARDIAC OUTPUT, PERCUTANEOUS APPROACH: ICD-10-PCS | Performed by: INTERNAL MEDICINE

## 2017-05-04 PROCEDURE — 97530 THERAPEUTIC ACTIVITIES: CPT

## 2017-05-04 PROCEDURE — 85027 COMPLETE CBC AUTOMATED: CPT

## 2017-05-04 PROCEDURE — C1769 GUIDE WIRE: HCPCS | Performed by: INTERNAL MEDICINE

## 2017-05-04 PROCEDURE — 82962 GLUCOSE BLOOD TEST: CPT

## 2017-05-04 PROCEDURE — 10004651 HB RX, NO CHARGE ITEM: Performed by: NURSE PRACTITIONER

## 2017-05-04 PROCEDURE — C1894 INTRO/SHEATH, NON-LASER: HCPCS | Performed by: INTERNAL MEDICINE

## 2017-05-04 RX ORDER — FUROSEMIDE 20 MG/1
20 TABLET ORAL ONCE
Status: COMPLETED | OUTPATIENT
Start: 2017-05-04 | End: 2017-05-04

## 2017-05-04 RX ORDER — PREDNISONE 20 MG/1
20 TABLET ORAL
Status: COMPLETED | OUTPATIENT
Start: 2017-05-04 | End: 2017-05-04

## 2017-05-04 RX ORDER — FUROSEMIDE 20 MG/1
20 TABLET ORAL DAILY
Status: DISCONTINUED | OUTPATIENT
Start: 2017-05-04 | End: 2017-05-04

## 2017-05-04 RX ORDER — PREDNISONE 10 MG/1
10 TABLET ORAL
Status: COMPLETED | OUTPATIENT
Start: 2017-05-05 | End: 2017-05-05

## 2017-05-04 RX ORDER — WARFARIN SODIUM 5 MG/1
5 TABLET ORAL ONCE
Status: COMPLETED | OUTPATIENT
Start: 2017-05-04 | End: 2017-05-04

## 2017-05-04 RX ORDER — PREDNISONE 5 MG/1
5 TABLET ORAL
Status: DISCONTINUED | OUTPATIENT
Start: 2017-05-06 | End: 2017-05-05

## 2017-05-04 RX ORDER — PREDNISONE 20 MG/1
20 TABLET ORAL
Status: DISCONTINUED | OUTPATIENT
Start: 2017-05-04 | End: 2017-05-04

## 2017-05-04 RX ORDER — PREDNISONE 2.5 MG/1
2.5 TABLET ORAL
Status: DISCONTINUED | OUTPATIENT
Start: 2017-05-07 | End: 2017-05-05

## 2017-05-04 RX ADMIN — PREDNISONE 20 MG: 20 TABLET ORAL at 13:20

## 2017-05-04 RX ADMIN — INSULIN LISPRO 6 UNITS: 100 INJECTION, SOLUTION INTRAVENOUS; SUBCUTANEOUS at 18:02

## 2017-05-04 RX ADMIN — Medication 10 ML: at 15:32

## 2017-05-04 RX ADMIN — Medication 10 ML: at 05:52

## 2017-05-04 RX ADMIN — LIDOCAINE HYDROCHLORIDE: 20 INJECTION, SOLUTION EPIDURAL; INFILTRATION; INTRACAUDAL; PERINEURAL at 10:46

## 2017-05-04 RX ADMIN — WARFARIN SODIUM 5 MG: 5 TABLET ORAL at 18:02

## 2017-05-04 RX ADMIN — ASPIRIN 81 MG: 81 TABLET ORAL at 13:20

## 2017-05-04 RX ADMIN — EPOPROSTENOL 3 NG/KG/MIN: 1.5 INJECTION, POWDER, LYOPHILIZED, FOR SOLUTION INTRAVENOUS at 14:44

## 2017-05-04 RX ADMIN — PAROXETINE 20 MG: 20 TABLET, FILM COATED ORAL at 13:20

## 2017-05-04 RX ADMIN — HEPARIN SODIUM 3000 UNITS: 1000 INJECTION, SOLUTION INTRAVENOUS; SUBCUTANEOUS at 09:20

## 2017-05-04 RX ADMIN — HEPARIN SODIUM AND DEXTROSE 11 UNITS/KG/HR: 5000; 5 INJECTION INTRAVENOUS at 21:45

## 2017-05-04 RX ADMIN — CLONAZEPAM 0.5 MG: 0.5 TABLET ORAL at 21:40

## 2017-05-04 RX ADMIN — HEPARIN SODIUM 1000 ML: 200 INJECTION, SOLUTION INTRAVENOUS at 10:22

## 2017-05-04 RX ADMIN — OLANZAPINE 0.62 MG: 2.5 TABLET, FILM COATED ORAL at 21:39

## 2017-05-04 RX ADMIN — Medication 10 ML: at 21:48

## 2017-05-04 RX ADMIN — FUROSEMIDE 20 MG: 20 TABLET ORAL at 15:29

## 2017-05-04 ASSESSMENT — PAIN SCALES - GENERAL
PAIN_LEVEL_AT_REST: 0

## 2017-05-05 LAB
ANION GAP SERPL CALC-SCNC: 9 MMOL/L (ref 10–20)
APTT PPP: 60 SEC (ref 22–30)
BNP SERPL-MCNC: 29 PG/ML
BUN SERPL-MCNC: 16 MG/DL (ref 6–20)
BUN/CREAT SERPL: 23 (ref 7–25)
CALCIUM SERPL-MCNC: 8.6 MG/DL (ref 8.4–10.2)
CHLORIDE SERPL-SCNC: 104 MMOL/L (ref 98–107)
CO2 SERPL-SCNC: 29 MMOL/L (ref 21–32)
CREAT SERPL-MCNC: 0.69 MG/DL (ref 0.51–0.95)
ERYTHROCYTE [DISTWIDTH] IN BLOOD: 15.7 % (ref 11–15)
GLUCOSE BLDC GLUCOMTR-MCNC: 102 MG/DL (ref 65–99)
GLUCOSE BLDC GLUCOMTR-MCNC: 190 MG/DL (ref 65–99)
GLUCOSE BLDC GLUCOMTR-MCNC: 206 MG/DL (ref 65–99)
GLUCOSE SERPL-MCNC: 101 MG/DL (ref 65–99)
HCT VFR BLD CALC: 28.1 % (ref 36–46.5)
HGB BLD-MCNC: 8.6 G/DL (ref 12–15.5)
INR PPP: 1
MCH RBC QN AUTO: 24.7 PG (ref 26–34)
MCHC RBC AUTO-ENTMCNC: 30.6 G/DL (ref 32–36.5)
MCV RBC AUTO: 80.7 FL (ref 78–100)
PLATELET # BLD: 243 K/MCL (ref 140–450)
POTASSIUM SERPL-SCNC: 4 MMOL/L (ref 3.4–5.1)
PROTHROMBIN TIME: 11.5 SEC (ref 9.7–11.8)
RBC # BLD: 3.48 MIL/MCL (ref 4–5.2)
SODIUM SERPL-SCNC: 138 MMOL/L (ref 135–145)
WBC # BLD: 7.6 K/MCL (ref 4.2–11)

## 2017-05-05 PROCEDURE — 99233 SBSQ HOSP IP/OBS HIGH 50: CPT | Performed by: INTERNAL MEDICINE

## 2017-05-05 PROCEDURE — 99232 SBSQ HOSP IP/OBS MODERATE 35: CPT | Performed by: HOSPITALIST

## 2017-05-05 PROCEDURE — 10003445 HB TELEMETRY PER DAY

## 2017-05-05 PROCEDURE — 10002803 HB RX 637: Performed by: HOSPITALIST

## 2017-05-05 PROCEDURE — 80048 BASIC METABOLIC PNL TOTAL CA: CPT

## 2017-05-05 PROCEDURE — 10002800 HB RX 250 W HCPCS: Performed by: INTERNAL MEDICINE

## 2017-05-05 PROCEDURE — 82962 GLUCOSE BLOOD TEST: CPT

## 2017-05-05 PROCEDURE — 85027 COMPLETE CBC AUTOMATED: CPT

## 2017-05-05 PROCEDURE — 85610 PROTHROMBIN TIME: CPT

## 2017-05-05 PROCEDURE — 10004651 HB RX, NO CHARGE ITEM: Performed by: INTERNAL MEDICINE

## 2017-05-05 PROCEDURE — 10002803 HB RX 637: Performed by: INTERNAL MEDICINE

## 2017-05-05 PROCEDURE — 83880 ASSAY OF NATRIURETIC PEPTIDE: CPT

## 2017-05-05 PROCEDURE — 10000002 HB ROOM CHARGE MED SURG

## 2017-05-05 PROCEDURE — 10004651 HB RX, NO CHARGE ITEM: Performed by: NURSE PRACTITIONER

## 2017-05-05 PROCEDURE — 85730 THROMBOPLASTIN TIME PARTIAL: CPT

## 2017-05-05 RX ORDER — WARFARIN SODIUM 7.5 MG/1
7.5 TABLET ORAL ONCE
Status: COMPLETED | OUTPATIENT
Start: 2017-05-05 | End: 2017-05-05

## 2017-05-05 RX ORDER — PREDNISONE 10 MG/1
10 TABLET ORAL
Status: COMPLETED | OUTPATIENT
Start: 2017-05-06 | End: 2017-05-07

## 2017-05-05 RX ORDER — PREDNISONE 5 MG/1
5 TABLET ORAL
Status: COMPLETED | OUTPATIENT
Start: 2017-05-08 | End: 2017-05-10

## 2017-05-05 RX ORDER — PREDNISONE 2.5 MG/1
2.5 TABLET ORAL
Status: COMPLETED | OUTPATIENT
Start: 2017-05-11 | End: 2017-05-12

## 2017-05-05 RX ADMIN — HEPARIN SODIUM AND DEXTROSE 11 UNITS/KG/HR: 5000; 5 INJECTION INTRAVENOUS at 21:15

## 2017-05-05 RX ADMIN — ASPIRIN 81 MG: 81 TABLET ORAL at 12:23

## 2017-05-05 RX ADMIN — EPOPROSTENOL 4 NG/KG/MIN: 1.5 INJECTION, POWDER, LYOPHILIZED, FOR SOLUTION INTRAVENOUS at 14:29

## 2017-05-05 RX ADMIN — Medication 2 ML: at 06:14

## 2017-05-05 RX ADMIN — INSULIN LISPRO 2 UNITS: 100 INJECTION, SOLUTION INTRAVENOUS; SUBCUTANEOUS at 12:24

## 2017-05-05 RX ADMIN — WARFARIN SODIUM 7.5 MG: 7.5 TABLET ORAL at 18:07

## 2017-05-05 RX ADMIN — OLANZAPINE 0.62 MG: 2.5 TABLET, FILM COATED ORAL at 21:16

## 2017-05-05 RX ADMIN — PREDNISONE 10 MG: 10 TABLET ORAL at 08:48

## 2017-05-05 RX ADMIN — CLONAZEPAM 0.5 MG: 0.5 TABLET ORAL at 21:16

## 2017-05-05 RX ADMIN — PAROXETINE 20 MG: 20 TABLET, FILM COATED ORAL at 12:23

## 2017-05-05 RX ADMIN — Medication 10 ML: at 12:27

## 2017-05-05 RX ADMIN — INSULIN LISPRO 4 UNITS: 100 INJECTION, SOLUTION INTRAVENOUS; SUBCUTANEOUS at 18:06

## 2017-05-05 ASSESSMENT — PAIN SCALES - GENERAL
PAIN_LEVEL_AT_REST: 0

## 2017-05-06 LAB
APTT PPP: 56 SEC (ref 22–30)
ERYTHROCYTE [DISTWIDTH] IN BLOOD: 17.1 % (ref 11–15)
GLUCOSE BLDC GLUCOMTR-MCNC: 166 MG/DL (ref 65–99)
GLUCOSE BLDC GLUCOMTR-MCNC: 178 MG/DL (ref 65–99)
GLUCOSE BLDC GLUCOMTR-MCNC: 89 MG/DL (ref 65–99)
HCT VFR BLD CALC: 28 % (ref 36–46.5)
HGB BLD-MCNC: 8.5 G/DL (ref 12–15.5)
INR PPP: 1.2
MCH RBC QN AUTO: 25 PG (ref 26–34)
MCHC RBC AUTO-ENTMCNC: 30.4 G/DL (ref 32–36.5)
MCV RBC AUTO: 82.4 FL (ref 78–100)
PLATELET # BLD: 243 K/MCL (ref 140–450)
PROTHROMBIN TIME: 12.6 SEC (ref 9.7–11.8)
RBC # BLD: 3.4 MIL/MCL (ref 4–5.2)
WBC # BLD: 6.6 K/MCL (ref 4.2–11)

## 2017-05-06 PROCEDURE — 99232 SBSQ HOSP IP/OBS MODERATE 35: CPT | Performed by: HOSPITALIST

## 2017-05-06 PROCEDURE — 10004172 HB COUNTER-THERAPY VISIT OT

## 2017-05-06 PROCEDURE — 10004173 HB COUNTER-THERAPY VISIT PT

## 2017-05-06 PROCEDURE — 10004651 HB RX, NO CHARGE ITEM: Performed by: NURSE PRACTITIONER

## 2017-05-06 PROCEDURE — 10004651 HB RX, NO CHARGE ITEM: Performed by: INTERNAL MEDICINE

## 2017-05-06 PROCEDURE — 99233 SBSQ HOSP IP/OBS HIGH 50: CPT | Performed by: INTERNAL MEDICINE

## 2017-05-06 PROCEDURE — 97535 SELF CARE MNGMENT TRAINING: CPT

## 2017-05-06 PROCEDURE — 97530 THERAPEUTIC ACTIVITIES: CPT

## 2017-05-06 PROCEDURE — 10000002 HB ROOM CHARGE MED SURG

## 2017-05-06 PROCEDURE — 10002803 HB RX 637: Performed by: HOSPITALIST

## 2017-05-06 PROCEDURE — 97116 GAIT TRAINING THERAPY: CPT

## 2017-05-06 PROCEDURE — 85027 COMPLETE CBC AUTOMATED: CPT

## 2017-05-06 PROCEDURE — 10002803 HB RX 637: Performed by: INTERNAL MEDICINE

## 2017-05-06 PROCEDURE — 10002800 HB RX 250 W HCPCS: Performed by: INTERNAL MEDICINE

## 2017-05-06 PROCEDURE — 10003445 HB TELEMETRY PER DAY

## 2017-05-06 PROCEDURE — 82962 GLUCOSE BLOOD TEST: CPT

## 2017-05-06 PROCEDURE — 85730 THROMBOPLASTIN TIME PARTIAL: CPT

## 2017-05-06 PROCEDURE — 85610 PROTHROMBIN TIME: CPT

## 2017-05-06 PROCEDURE — 97110 THERAPEUTIC EXERCISES: CPT

## 2017-05-06 RX ORDER — WARFARIN SODIUM 7.5 MG/1
7.5 TABLET ORAL ONCE
Status: COMPLETED | OUTPATIENT
Start: 2017-05-06 | End: 2017-05-06

## 2017-05-06 RX ADMIN — PREDNISONE 10 MG: 10 TABLET ORAL at 08:59

## 2017-05-06 RX ADMIN — PAROXETINE 20 MG: 20 TABLET, FILM COATED ORAL at 13:30

## 2017-05-06 RX ADMIN — ASPIRIN 81 MG: 81 TABLET ORAL at 13:30

## 2017-05-06 RX ADMIN — EPOPROSTENOL 5 NG/KG/MIN: 1.5 INJECTION, POWDER, LYOPHILIZED, FOR SOLUTION INTRAVENOUS at 12:55

## 2017-05-06 RX ADMIN — Medication 10 ML: at 06:34

## 2017-05-06 RX ADMIN — OLANZAPINE 0.62 MG: 2.5 TABLET, FILM COATED ORAL at 21:33

## 2017-05-06 RX ADMIN — WARFARIN SODIUM 7.5 MG: 7.5 TABLET ORAL at 17:56

## 2017-05-06 RX ADMIN — CLONAZEPAM 0.5 MG: 0.5 TABLET ORAL at 21:33

## 2017-05-06 RX ADMIN — INSULIN LISPRO 2 UNITS: 100 INJECTION, SOLUTION INTRAVENOUS; SUBCUTANEOUS at 17:58

## 2017-05-06 RX ADMIN — HEPARIN SODIUM AND DEXTROSE 11 UNITS/KG/HR: 5000; 5 INJECTION INTRAVENOUS at 21:33

## 2017-05-06 RX ADMIN — INSULIN LISPRO 2 UNITS: 100 INJECTION, SOLUTION INTRAVENOUS; SUBCUTANEOUS at 13:30

## 2017-05-06 ASSESSMENT — PAIN SCALES - GENERAL
PAIN_LEVEL_WITH_ACTIVITY: 0
PAIN_LEVEL_AT_REST: 0

## 2017-05-07 LAB
APTT PPP: 62 SEC (ref 22–30)
ERYTHROCYTE [DISTWIDTH] IN BLOOD: 17.8 % (ref 11–15)
GLUCOSE BLDC GLUCOMTR-MCNC: 102 MG/DL (ref 65–99)
GLUCOSE BLDC GLUCOMTR-MCNC: 172 MG/DL (ref 65–99)
GLUCOSE BLDC GLUCOMTR-MCNC: 184 MG/DL (ref 65–99)
HCT VFR BLD CALC: 30 % (ref 36–46.5)
HGB BLD-MCNC: 9.4 G/DL (ref 12–15.5)
INR PPP: 1.3
MCH RBC QN AUTO: 26.2 PG (ref 26–34)
MCHC RBC AUTO-ENTMCNC: 31.3 G/DL (ref 32–36.5)
MCV RBC AUTO: 83.6 FL (ref 78–100)
PLATELET # BLD: 249 K/MCL (ref 140–450)
PROTHROMBIN TIME: 14.4 SEC (ref 9.7–11.8)
RBC # BLD: 3.59 MIL/MCL (ref 4–5.2)
WBC # BLD: 6 K/MCL (ref 4.2–11)

## 2017-05-07 PROCEDURE — 85610 PROTHROMBIN TIME: CPT

## 2017-05-07 PROCEDURE — 97110 THERAPEUTIC EXERCISES: CPT

## 2017-05-07 PROCEDURE — 85027 COMPLETE CBC AUTOMATED: CPT

## 2017-05-07 PROCEDURE — 10003445 HB TELEMETRY PER DAY

## 2017-05-07 PROCEDURE — 10004173 HB COUNTER-THERAPY VISIT PT

## 2017-05-07 PROCEDURE — 97116 GAIT TRAINING THERAPY: CPT

## 2017-05-07 PROCEDURE — 10002803 HB RX 637: Performed by: INTERNAL MEDICINE

## 2017-05-07 PROCEDURE — 99232 SBSQ HOSP IP/OBS MODERATE 35: CPT | Performed by: HOSPITALIST

## 2017-05-07 PROCEDURE — 10004651 HB RX, NO CHARGE ITEM: Performed by: NURSE PRACTITIONER

## 2017-05-07 PROCEDURE — 10004651 HB RX, NO CHARGE ITEM: Performed by: INTERNAL MEDICINE

## 2017-05-07 PROCEDURE — 10002800 HB RX 250 W HCPCS: Performed by: INTERNAL MEDICINE

## 2017-05-07 PROCEDURE — 85730 THROMBOPLASTIN TIME PARTIAL: CPT

## 2017-05-07 PROCEDURE — 10000002 HB ROOM CHARGE MED SURG

## 2017-05-07 PROCEDURE — 97112 NEUROMUSCULAR REEDUCATION: CPT

## 2017-05-07 PROCEDURE — 82962 GLUCOSE BLOOD TEST: CPT

## 2017-05-07 PROCEDURE — 10002803 HB RX 637: Performed by: HOSPITALIST

## 2017-05-07 RX ORDER — WARFARIN SODIUM 10 MG/1
10 TABLET ORAL ONCE
Status: COMPLETED | OUTPATIENT
Start: 2017-05-07 | End: 2017-05-07

## 2017-05-07 RX ADMIN — PREDNISONE 10 MG: 10 TABLET ORAL at 09:47

## 2017-05-07 RX ADMIN — OLANZAPINE 0.62 MG: 2.5 TABLET, FILM COATED ORAL at 21:27

## 2017-05-07 RX ADMIN — WARFARIN SODIUM 10 MG: 10 TABLET ORAL at 18:16

## 2017-05-07 RX ADMIN — INSULIN LISPRO 2 UNITS: 100 INJECTION, SOLUTION INTRAVENOUS; SUBCUTANEOUS at 18:14

## 2017-05-07 RX ADMIN — INSULIN LISPRO 2 UNITS: 100 INJECTION, SOLUTION INTRAVENOUS; SUBCUTANEOUS at 13:35

## 2017-05-07 RX ADMIN — ASPIRIN 81 MG: 81 TABLET ORAL at 13:35

## 2017-05-07 RX ADMIN — CLONAZEPAM 0.5 MG: 0.5 TABLET ORAL at 21:27

## 2017-05-07 RX ADMIN — HEPARIN SODIUM AND DEXTROSE 11 UNITS/KG/HR: 5000; 5 INJECTION INTRAVENOUS at 21:27

## 2017-05-07 RX ADMIN — EPOPROSTENOL 6 NG/KG/MIN: 1.5 INJECTION, POWDER, LYOPHILIZED, FOR SOLUTION INTRAVENOUS at 13:30

## 2017-05-07 RX ADMIN — Medication 10 ML: at 05:30

## 2017-05-07 RX ADMIN — PAROXETINE 20 MG: 20 TABLET, FILM COATED ORAL at 13:35

## 2017-05-07 ASSESSMENT — PAIN SCALES - GENERAL: PAIN_LEVEL_AT_REST: 0

## 2017-05-08 LAB
APTT PPP: 68 SEC (ref 22–30)
ERYTHROCYTE [DISTWIDTH] IN BLOOD: 18.2 % (ref 11–15)
GLUCOSE BLDC GLUCOMTR-MCNC: 141 MG/DL (ref 65–99)
GLUCOSE BLDC GLUCOMTR-MCNC: 149 MG/DL (ref 65–99)
GLUCOSE BLDC GLUCOMTR-MCNC: 98 MG/DL (ref 65–99)
HCT VFR BLD CALC: 27.5 % (ref 36–46.5)
HGB BLD-MCNC: 8.5 G/DL (ref 12–15.5)
INR PPP: 1.6
MCH RBC QN AUTO: 25.4 PG (ref 26–34)
MCHC RBC AUTO-ENTMCNC: 30.9 G/DL (ref 32–36.5)
MCV RBC AUTO: 82.3 FL (ref 78–100)
PLATELET # BLD: 245 K/MCL (ref 140–450)
PROTHROMBIN TIME: 16.9 SEC (ref 9.7–11.8)
RBC # BLD: 3.34 MIL/MCL (ref 4–5.2)
WBC # BLD: 6 K/MCL (ref 4.2–11)

## 2017-05-08 PROCEDURE — 10004651 HB RX, NO CHARGE ITEM: Performed by: INTERNAL MEDICINE

## 2017-05-08 PROCEDURE — 99232 SBSQ HOSP IP/OBS MODERATE 35: CPT | Performed by: INTERNAL MEDICINE

## 2017-05-08 PROCEDURE — 85027 COMPLETE CBC AUTOMATED: CPT

## 2017-05-08 PROCEDURE — 82668 ASSAY OF ERYTHROPOIETIN: CPT

## 2017-05-08 PROCEDURE — 10004651 HB RX, NO CHARGE ITEM: Performed by: NURSE PRACTITIONER

## 2017-05-08 PROCEDURE — 82962 GLUCOSE BLOOD TEST: CPT

## 2017-05-08 PROCEDURE — 85730 THROMBOPLASTIN TIME PARTIAL: CPT

## 2017-05-08 PROCEDURE — 97116 GAIT TRAINING THERAPY: CPT

## 2017-05-08 PROCEDURE — 10002800 HB RX 250 W HCPCS: Performed by: NURSE PRACTITIONER

## 2017-05-08 PROCEDURE — 10003445 HB TELEMETRY PER DAY

## 2017-05-08 PROCEDURE — 85610 PROTHROMBIN TIME: CPT

## 2017-05-08 PROCEDURE — 10004173 HB COUNTER-THERAPY VISIT PT

## 2017-05-08 PROCEDURE — 97110 THERAPEUTIC EXERCISES: CPT

## 2017-05-08 PROCEDURE — 99233 SBSQ HOSP IP/OBS HIGH 50: CPT | Performed by: INTERNAL MEDICINE

## 2017-05-08 PROCEDURE — 10002803 HB RX 637: Performed by: INTERNAL MEDICINE

## 2017-05-08 PROCEDURE — 97530 THERAPEUTIC ACTIVITIES: CPT

## 2017-05-08 PROCEDURE — 99254 IP/OBS CNSLTJ NEW/EST MOD 60: CPT | Performed by: INTERNAL MEDICINE

## 2017-05-08 PROCEDURE — 99232 SBSQ HOSP IP/OBS MODERATE 35: CPT | Performed by: HOSPITALIST

## 2017-05-08 PROCEDURE — 10002800 HB RX 250 W HCPCS: Performed by: INTERNAL MEDICINE

## 2017-05-08 PROCEDURE — 10000002 HB ROOM CHARGE MED SURG

## 2017-05-08 RX ORDER — WARFARIN SODIUM 10 MG/1
10 TABLET ORAL ONCE
Status: DISCONTINUED | OUTPATIENT
Start: 2017-05-08 | End: 2017-05-08

## 2017-05-08 RX ADMIN — CLONAZEPAM 0.5 MG: 0.5 TABLET ORAL at 21:38

## 2017-05-08 RX ADMIN — HEPARIN SODIUM AND DEXTROSE 11 UNITS/KG/HR: 5000; 5 INJECTION INTRAVENOUS at 21:37

## 2017-05-08 RX ADMIN — ASPIRIN 81 MG: 81 TABLET ORAL at 13:39

## 2017-05-08 RX ADMIN — PAROXETINE 20 MG: 20 TABLET, FILM COATED ORAL at 13:40

## 2017-05-08 RX ADMIN — EPOPROSTENOL 7 NG/KG/MIN: 1.5 INJECTION, POWDER, LYOPHILIZED, FOR SOLUTION INTRAVENOUS at 13:44

## 2017-05-08 RX ADMIN — OLANZAPINE 0.62 MG: 2.5 TABLET, FILM COATED ORAL at 21:38

## 2017-05-08 RX ADMIN — PREDNISONE 5 MG: 5 TABLET ORAL at 08:43

## 2017-05-08 RX ADMIN — Medication 10 ML: at 05:31

## 2017-05-08 ASSESSMENT — PAIN SCALES - GENERAL
PAIN_LEVEL_AT_REST: 0

## 2017-05-09 ENCOUNTER — APPOINTMENT (OUTPATIENT)
Dept: INTERVENTIONAL RADIOLOGY/VASCULAR | Age: 67
DRG: 176 | End: 2017-05-09
Attending: NURSE PRACTITIONER

## 2017-05-09 LAB
APTT PPP: 66 SEC (ref 22–30)
EPO SERPL-ACNC: 40.5 MUNITS/ML (ref 4–27)
GLUCOSE BLDC GLUCOMTR-MCNC: 130 MG/DL (ref 65–99)
GLUCOSE BLDC GLUCOMTR-MCNC: 95 MG/DL (ref 65–99)
INR PPP: 1.4
PROTHROMBIN TIME: 15.2 SEC (ref 9.7–11.8)

## 2017-05-09 PROCEDURE — 10004651 HB RX, NO CHARGE ITEM: Performed by: NURSE PRACTITIONER

## 2017-05-09 PROCEDURE — 36558 INSERT TUNNELED CV CATH: CPT

## 2017-05-09 PROCEDURE — 36558 INSERT TUNNELED CV CATH: CPT | Performed by: RADIOLOGY

## 2017-05-09 PROCEDURE — 85730 THROMBOPLASTIN TIME PARTIAL: CPT

## 2017-05-09 PROCEDURE — 10000002 HB ROOM CHARGE MED SURG

## 2017-05-09 PROCEDURE — 10004651 HB RX, NO CHARGE ITEM: Performed by: INTERNAL MEDICINE

## 2017-05-09 PROCEDURE — 10002800 HB RX 250 W HCPCS: Performed by: RADIOLOGY

## 2017-05-09 PROCEDURE — 99232 SBSQ HOSP IP/OBS MODERATE 35: CPT | Performed by: NURSE PRACTITIONER

## 2017-05-09 PROCEDURE — C1769 GUIDE WIRE: HCPCS

## 2017-05-09 PROCEDURE — 02H633Z INSERTION OF INFUSION DEVICE INTO RIGHT ATRIUM, PERCUTANEOUS APPROACH: ICD-10-PCS | Performed by: RADIOLOGY

## 2017-05-09 PROCEDURE — 82962 GLUCOSE BLOOD TEST: CPT

## 2017-05-09 PROCEDURE — 85610 PROTHROMBIN TIME: CPT

## 2017-05-09 PROCEDURE — 10002803 HB RX 637: Performed by: INTERNAL MEDICINE

## 2017-05-09 PROCEDURE — 99233 SBSQ HOSP IP/OBS HIGH 50: CPT | Performed by: INTERNAL MEDICINE

## 2017-05-09 PROCEDURE — 76937 US GUIDE VASCULAR ACCESS: CPT | Performed by: RADIOLOGY

## 2017-05-09 PROCEDURE — 10003056 HB DISPOSABLE INSTRUMENT/SUPPLY 1

## 2017-05-09 PROCEDURE — 10002801 HB RX 250 W/O HCPCS: Performed by: RADIOLOGY

## 2017-05-09 PROCEDURE — 10002800 HB RX 250 W HCPCS: Performed by: NURSE PRACTITIONER

## 2017-05-09 PROCEDURE — 77001 FLUOROGUIDE FOR VEIN DEVICE: CPT | Performed by: RADIOLOGY

## 2017-05-09 PROCEDURE — 10003445 HB TELEMETRY PER DAY

## 2017-05-09 PROCEDURE — 99232 SBSQ HOSP IP/OBS MODERATE 35: CPT | Performed by: HOSPITALIST

## 2017-05-09 RX ORDER — HEPARIN SODIUM,PORCINE/PF 10 UNIT/ML
SYRINGE (ML) INTRAVENOUS
Status: COMPLETED | OUTPATIENT
Start: 2017-05-09 | End: 2017-05-09

## 2017-05-09 RX ORDER — LIDOCAINE HYDROCHLORIDE AND EPINEPHRINE 10; 10 MG/ML; UG/ML
INJECTION, SOLUTION INFILTRATION; PERINEURAL
Status: COMPLETED | OUTPATIENT
Start: 2017-05-09 | End: 2017-05-09

## 2017-05-09 RX ORDER — WARFARIN SODIUM 10 MG/1
10 TABLET ORAL ONCE
Status: COMPLETED | OUTPATIENT
Start: 2017-05-09 | End: 2017-05-09

## 2017-05-09 RX ORDER — LIDOCAINE HYDROCHLORIDE 10 MG/ML
INJECTION, SOLUTION INFILTRATION; PERINEURAL
Status: COMPLETED | OUTPATIENT
Start: 2017-05-09 | End: 2017-05-09

## 2017-05-09 RX ADMIN — CLONAZEPAM 0.5 MG: 0.5 TABLET ORAL at 21:58

## 2017-05-09 RX ADMIN — WARFARIN SODIUM 10 MG: 10 TABLET ORAL at 19:18

## 2017-05-09 RX ADMIN — Medication 10 ML: at 05:13

## 2017-05-09 RX ADMIN — PREDNISONE 5 MG: 5 TABLET ORAL at 09:33

## 2017-05-09 RX ADMIN — SODIUM CHLORIDE, PRESERVATIVE FREE 50 UNITS: 5 INJECTION INTRAVENOUS at 18:17

## 2017-05-09 RX ADMIN — OLANZAPINE 0.62 MG: 2.5 TABLET, FILM COATED ORAL at 21:58

## 2017-05-09 RX ADMIN — LIDOCAINE HYDROCHLORIDE 10 ML: 10 INJECTION, SOLUTION INFILTRATION; PERINEURAL at 18:04

## 2017-05-09 RX ADMIN — LIDOCAINE HYDROCHLORIDE,EPINEPHRINE BITARTRATE 3 ML: 10; .01 INJECTION, SOLUTION INFILTRATION; PERINEURAL at 18:06

## 2017-05-09 RX ADMIN — Medication 10 ML: at 21:59

## 2017-05-09 RX ADMIN — PAROXETINE 20 MG: 20 TABLET, FILM COATED ORAL at 12:49

## 2017-05-09 RX ADMIN — ASPIRIN 81 MG: 81 TABLET ORAL at 19:18

## 2017-05-09 RX ADMIN — EPOPROSTENOL 7 NG/KG/MIN: 1.5 INJECTION, POWDER, LYOPHILIZED, FOR SOLUTION INTRAVENOUS at 13:40

## 2017-05-09 ASSESSMENT — PAIN SCALES - GENERAL
PAIN_LEVEL_AT_REST: 0
PAIN_LEVEL_AT_REST: 0
PAIN_LEVEL_AT_REST: 2
PAIN_LEVEL_AT_REST: 2

## 2017-05-10 LAB
APTT PPP: 41 SEC (ref 22–30)
APTT PPP: 48 SEC (ref 22–30)
APTT PPP: 70 SEC (ref 22–30)
ERYTHROCYTE [DISTWIDTH] IN BLOOD: 19 % (ref 11–15)
HCT VFR BLD CALC: 28.1 % (ref 36–46.5)
HGB BLD-MCNC: 8.7 G/DL (ref 12–15.5)
INR PPP: 1.1
MCH RBC QN AUTO: 25.6 PG (ref 26–34)
MCHC RBC AUTO-ENTMCNC: 31 G/DL (ref 32–36.5)
MCV RBC AUTO: 82.6 FL (ref 78–100)
PLATELET # BLD: 264 K/MCL (ref 140–450)
PROTHROMBIN TIME: 12.2 SEC (ref 9.7–11.8)
RBC # BLD: 3.4 MIL/MCL (ref 4–5.2)
WBC # BLD: 5.5 K/MCL (ref 4.2–11)

## 2017-05-10 PROCEDURE — 10004173 HB COUNTER-THERAPY VISIT PT

## 2017-05-10 PROCEDURE — 85027 COMPLETE CBC AUTOMATED: CPT

## 2017-05-10 PROCEDURE — 85610 PROTHROMBIN TIME: CPT

## 2017-05-10 PROCEDURE — 85730 THROMBOPLASTIN TIME PARTIAL: CPT

## 2017-05-10 PROCEDURE — 10002803 HB RX 637: Performed by: HOSPITALIST

## 2017-05-10 PROCEDURE — 10004651 HB RX, NO CHARGE ITEM: Performed by: INTERNAL MEDICINE

## 2017-05-10 PROCEDURE — 10002800 HB RX 250 W HCPCS: Performed by: NURSE PRACTITIONER

## 2017-05-10 PROCEDURE — 10003445 HB TELEMETRY PER DAY

## 2017-05-10 PROCEDURE — 10002800 HB RX 250 W HCPCS: Performed by: INTERNAL MEDICINE

## 2017-05-10 PROCEDURE — 10004651 HB RX, NO CHARGE ITEM: Performed by: NURSE PRACTITIONER

## 2017-05-10 PROCEDURE — 10000002 HB ROOM CHARGE MED SURG

## 2017-05-10 PROCEDURE — 99232 SBSQ HOSP IP/OBS MODERATE 35: CPT | Performed by: INTERNAL MEDICINE

## 2017-05-10 PROCEDURE — 97530 THERAPEUTIC ACTIVITIES: CPT

## 2017-05-10 PROCEDURE — 10002803 HB RX 637: Performed by: INTERNAL MEDICINE

## 2017-05-10 PROCEDURE — 97116 GAIT TRAINING THERAPY: CPT

## 2017-05-10 PROCEDURE — 99232 SBSQ HOSP IP/OBS MODERATE 35: CPT | Performed by: HOSPITALIST

## 2017-05-10 PROCEDURE — 99233 SBSQ HOSP IP/OBS HIGH 50: CPT | Performed by: INTERNAL MEDICINE

## 2017-05-10 RX ORDER — WARFARIN SODIUM 10 MG/1
10 TABLET ORAL ONCE
Status: COMPLETED | OUTPATIENT
Start: 2017-05-10 | End: 2017-05-10

## 2017-05-10 RX ADMIN — ASPIRIN 81 MG: 81 TABLET ORAL at 12:38

## 2017-05-10 RX ADMIN — PREDNISONE 5 MG: 5 TABLET ORAL at 10:00

## 2017-05-10 RX ADMIN — Medication 10 ML: at 14:50

## 2017-05-10 RX ADMIN — PAROXETINE 20 MG: 20 TABLET, FILM COATED ORAL at 12:38

## 2017-05-10 RX ADMIN — EPOPROSTENOL 7 NG/KG/MIN: 1.5 INJECTION, POWDER, LYOPHILIZED, FOR SOLUTION INTRAVENOUS at 12:39

## 2017-05-10 RX ADMIN — HEPARIN SODIUM AND DEXTROSE 11 UNITS/KG/HR: 5000; 5 INJECTION INTRAVENOUS at 00:29

## 2017-05-10 RX ADMIN — HEPARIN SODIUM AND DEXTROSE 13 UNITS/KG/HR: 5000; 5 INJECTION INTRAVENOUS at 22:15

## 2017-05-10 RX ADMIN — HEPARIN SODIUM 3000 UNITS: 1000 INJECTION, SOLUTION INTRAVENOUS; SUBCUTANEOUS at 16:00

## 2017-05-10 RX ADMIN — WARFARIN SODIUM 10 MG: 10 TABLET ORAL at 18:35

## 2017-05-10 RX ADMIN — Medication 10 ML: at 06:38

## 2017-05-10 RX ADMIN — OLANZAPINE 0.62 MG: 2.5 TABLET, FILM COATED ORAL at 21:53

## 2017-05-10 RX ADMIN — CLONAZEPAM 0.5 MG: 0.5 TABLET ORAL at 21:53

## 2017-05-10 ASSESSMENT — PAIN SCALES - GENERAL
PAIN_LEVEL_AT_REST: 0
PAIN_LEVEL_AT_REST: 1
PAIN_LEVEL_AT_REST: 1
PAIN_LEVEL_AT_REST: 0

## 2017-05-11 LAB
APTT PPP: 51 SEC (ref 22–30)
APTT PPP: 57 SEC (ref 22–30)
APTT PPP: 71 SEC (ref 22–30)
INR PPP: 1.2
PROTHROMBIN TIME: 13.6 SEC (ref 9.7–11.8)

## 2017-05-11 PROCEDURE — 10002800 HB RX 250 W HCPCS: Performed by: NURSE PRACTITIONER

## 2017-05-11 PROCEDURE — 99232 SBSQ HOSP IP/OBS MODERATE 35: CPT | Performed by: HOSPITALIST

## 2017-05-11 PROCEDURE — 10002803 HB RX 637: Performed by: INTERNAL MEDICINE

## 2017-05-11 PROCEDURE — 10003445 HB TELEMETRY PER DAY

## 2017-05-11 PROCEDURE — 85730 THROMBOPLASTIN TIME PARTIAL: CPT

## 2017-05-11 PROCEDURE — 97530 THERAPEUTIC ACTIVITIES: CPT

## 2017-05-11 PROCEDURE — 85610 PROTHROMBIN TIME: CPT

## 2017-05-11 PROCEDURE — 97116 GAIT TRAINING THERAPY: CPT

## 2017-05-11 PROCEDURE — 99233 SBSQ HOSP IP/OBS HIGH 50: CPT | Performed by: INTERNAL MEDICINE

## 2017-05-11 PROCEDURE — 10004173 HB COUNTER-THERAPY VISIT PT

## 2017-05-11 PROCEDURE — 10002803 HB RX 637: Performed by: HOSPITALIST

## 2017-05-11 PROCEDURE — 10000002 HB ROOM CHARGE MED SURG

## 2017-05-11 PROCEDURE — 10004651 HB RX, NO CHARGE ITEM: Performed by: NURSE PRACTITIONER

## 2017-05-11 PROCEDURE — 10002800 HB RX 250 W HCPCS: Performed by: INTERNAL MEDICINE

## 2017-05-11 PROCEDURE — 10004651 HB RX, NO CHARGE ITEM: Performed by: INTERNAL MEDICINE

## 2017-05-11 RX ORDER — WARFARIN SODIUM 10 MG/1
10 TABLET ORAL ONCE
Status: COMPLETED | OUTPATIENT
Start: 2017-05-11 | End: 2017-05-11

## 2017-05-11 RX ADMIN — PREDNISONE 2.5 MG: 2.5 TABLET ORAL at 10:06

## 2017-05-11 RX ADMIN — ASPIRIN 81 MG: 81 TABLET ORAL at 13:28

## 2017-05-11 RX ADMIN — WARFARIN SODIUM 10 MG: 10 TABLET ORAL at 18:51

## 2017-05-11 RX ADMIN — EPOPROSTENOL 7 NG/KG/MIN: 1.5 INJECTION, POWDER, LYOPHILIZED, FOR SOLUTION INTRAVENOUS at 13:36

## 2017-05-11 RX ADMIN — CLONAZEPAM 0.5 MG: 0.5 TABLET ORAL at 21:33

## 2017-05-11 RX ADMIN — PAROXETINE 20 MG: 20 TABLET, FILM COATED ORAL at 13:29

## 2017-05-11 RX ADMIN — HEPARIN SODIUM AND DEXTROSE 11 UNITS/KG/HR: 5000; 5 INJECTION INTRAVENOUS at 21:34

## 2017-05-11 RX ADMIN — Medication 10 ML: at 04:01

## 2017-05-11 RX ADMIN — OLANZAPINE 0.62 MG: 2.5 TABLET, FILM COATED ORAL at 21:33

## 2017-05-11 ASSESSMENT — PAIN SCALES - GENERAL
PAIN_LEVEL_AT_REST: 0
PAIN_LEVEL_AT_REST: 4
PAIN_LEVEL_AT_REST: 0
PAIN_LEVEL_AT_REST: 5

## 2017-05-12 LAB
APTT PPP: 61 SEC (ref 22–30)
ERYTHROCYTE [DISTWIDTH] IN BLOOD: 19.8 % (ref 11–15)
HCT VFR BLD CALC: 27.3 % (ref 36–46.5)
HGB BLD-MCNC: 8.5 G/DL (ref 12–15.5)
INR PPP: 1.5
MCH RBC QN AUTO: 25.8 PG (ref 26–34)
MCHC RBC AUTO-ENTMCNC: 31.1 G/DL (ref 32–36.5)
MCV RBC AUTO: 83 FL (ref 78–100)
PLATELET # BLD: 254 K/MCL (ref 140–450)
PROTHROMBIN TIME: 15.9 SEC (ref 9.7–11.8)
RBC # BLD: 3.29 MIL/MCL (ref 4–5.2)
WBC # BLD: 4.5 K/MCL (ref 4.2–11)

## 2017-05-12 PROCEDURE — 85610 PROTHROMBIN TIME: CPT

## 2017-05-12 PROCEDURE — 97116 GAIT TRAINING THERAPY: CPT

## 2017-05-12 PROCEDURE — 10004173 HB COUNTER-THERAPY VISIT PT

## 2017-05-12 PROCEDURE — 10002803 HB RX 637: Performed by: INTERNAL MEDICINE

## 2017-05-12 PROCEDURE — 99233 SBSQ HOSP IP/OBS HIGH 50: CPT | Performed by: INTERNAL MEDICINE

## 2017-05-12 PROCEDURE — 99232 SBSQ HOSP IP/OBS MODERATE 35: CPT | Performed by: HOSPITALIST

## 2017-05-12 PROCEDURE — 10000002 HB ROOM CHARGE MED SURG

## 2017-05-12 PROCEDURE — 97530 THERAPEUTIC ACTIVITIES: CPT

## 2017-05-12 PROCEDURE — 10002800 HB RX 250 W HCPCS: Performed by: NURSE PRACTITIONER

## 2017-05-12 PROCEDURE — 10004651 HB RX, NO CHARGE ITEM: Performed by: INTERNAL MEDICINE

## 2017-05-12 PROCEDURE — 10004651 HB RX, NO CHARGE ITEM: Performed by: NURSE PRACTITIONER

## 2017-05-12 PROCEDURE — 85730 THROMBOPLASTIN TIME PARTIAL: CPT

## 2017-05-12 PROCEDURE — 10002800 HB RX 250 W HCPCS: Performed by: INTERNAL MEDICINE

## 2017-05-12 PROCEDURE — 85027 COMPLETE CBC AUTOMATED: CPT

## 2017-05-12 RX ORDER — WARFARIN SODIUM 10 MG/1
10 TABLET ORAL ONCE
Status: COMPLETED | OUTPATIENT
Start: 2017-05-12 | End: 2017-05-12

## 2017-05-12 RX ADMIN — CLONAZEPAM 0.5 MG: 0.5 TABLET ORAL at 21:53

## 2017-05-12 RX ADMIN — EPOPROSTENOL 7 NG/KG/MIN: 1.5 INJECTION, POWDER, LYOPHILIZED, FOR SOLUTION INTRAVENOUS at 13:47

## 2017-05-12 RX ADMIN — PREDNISONE 2.5 MG: 2.5 TABLET ORAL at 09:30

## 2017-05-12 RX ADMIN — HEPARIN SODIUM AND DEXTROSE 11 UNITS/KG/HR: 5000; 5 INJECTION INTRAVENOUS at 22:14

## 2017-05-12 RX ADMIN — PAROXETINE 20 MG: 20 TABLET, FILM COATED ORAL at 12:52

## 2017-05-12 RX ADMIN — ASPIRIN 81 MG: 81 TABLET ORAL at 12:52

## 2017-05-12 RX ADMIN — WARFARIN SODIUM 10 MG: 10 TABLET ORAL at 17:59

## 2017-05-12 RX ADMIN — Medication 10 ML: at 05:50

## 2017-05-12 RX ADMIN — OLANZAPINE 0.62 MG: 2.5 TABLET, FILM COATED ORAL at 21:53

## 2017-05-12 ASSESSMENT — PAIN SCALES - GENERAL
PAIN_LEVEL_AT_REST: 4
PAIN_LEVEL_AT_REST: 5

## 2017-05-13 LAB
APTT PPP: 53 SEC (ref 22–30)
APTT PPP: 54 SEC (ref 22–30)
APTT PPP: 76 SEC (ref 22–30)
INR PPP: 1.6
PROTHROMBIN TIME: 17.3 SEC (ref 9.7–11.8)

## 2017-05-13 PROCEDURE — 85730 THROMBOPLASTIN TIME PARTIAL: CPT

## 2017-05-13 PROCEDURE — 97116 GAIT TRAINING THERAPY: CPT

## 2017-05-13 PROCEDURE — 10002803 HB RX 637: Performed by: NURSE PRACTITIONER

## 2017-05-13 PROCEDURE — 97110 THERAPEUTIC EXERCISES: CPT

## 2017-05-13 PROCEDURE — 10004651 HB RX, NO CHARGE ITEM: Performed by: INTERNAL MEDICINE

## 2017-05-13 PROCEDURE — 10002800 HB RX 250 W HCPCS: Performed by: INTERNAL MEDICINE

## 2017-05-13 PROCEDURE — 99232 SBSQ HOSP IP/OBS MODERATE 35: CPT | Performed by: NURSE PRACTITIONER

## 2017-05-13 PROCEDURE — 10000002 HB ROOM CHARGE MED SURG

## 2017-05-13 PROCEDURE — 10002803 HB RX 637: Performed by: INTERNAL MEDICINE

## 2017-05-13 PROCEDURE — 85610 PROTHROMBIN TIME: CPT

## 2017-05-13 PROCEDURE — 10004173 HB COUNTER-THERAPY VISIT PT

## 2017-05-13 PROCEDURE — 10002800 HB RX 250 W HCPCS: Performed by: NURSE PRACTITIONER

## 2017-05-13 PROCEDURE — 99232 SBSQ HOSP IP/OBS MODERATE 35: CPT | Performed by: HOSPITALIST

## 2017-05-13 PROCEDURE — 10004651 HB RX, NO CHARGE ITEM: Performed by: NURSE PRACTITIONER

## 2017-05-13 RX ORDER — WARFARIN SODIUM 10 MG/1
10 TABLET ORAL ONCE
Status: COMPLETED | OUTPATIENT
Start: 2017-05-13 | End: 2017-05-13

## 2017-05-13 RX ADMIN — Medication 10 ML: at 05:32

## 2017-05-13 RX ADMIN — WARFARIN SODIUM 10 MG: 10 TABLET ORAL at 18:07

## 2017-05-13 RX ADMIN — EPOPROSTENOL 7 NG/KG/MIN: 1.5 INJECTION, POWDER, LYOPHILIZED, FOR SOLUTION INTRAVENOUS at 14:09

## 2017-05-13 RX ADMIN — PAROXETINE 20 MG: 20 TABLET, FILM COATED ORAL at 13:31

## 2017-05-13 RX ADMIN — CLONAZEPAM 0.5 MG: 0.5 TABLET ORAL at 21:41

## 2017-05-13 RX ADMIN — ASPIRIN 81 MG: 81 TABLET ORAL at 13:31

## 2017-05-13 RX ADMIN — OLANZAPINE 0.62 MG: 2.5 TABLET, FILM COATED ORAL at 21:41

## 2017-05-13 RX ADMIN — HEPARIN SODIUM AND DEXTROSE 9 UNITS/KG/HR: 5000; 5 INJECTION INTRAVENOUS at 21:41

## 2017-05-13 ASSESSMENT — PAIN SCALES - GENERAL
PAIN_LEVEL_AT_REST: 4

## 2017-05-14 LAB
APTT PPP: 42 SEC (ref 22–30)
APTT PPP: 56 SEC (ref 22–30)
APTT PPP: 62 SEC (ref 22–30)
ERYTHROCYTE [DISTWIDTH] IN BLOOD: 20 % (ref 11–15)
HCT VFR BLD CALC: 28.7 % (ref 36–46.5)
HGB BLD-MCNC: 8.7 G/DL (ref 12–15.5)
INR PPP: 1.7
MCH RBC QN AUTO: 25.2 PG (ref 26–34)
MCHC RBC AUTO-ENTMCNC: 30.3 G/DL (ref 32–36.5)
MCV RBC AUTO: 83.2 FL (ref 78–100)
PLATELET # BLD: 331 K/MCL (ref 140–450)
PROTHROMBIN TIME: 18.5 SEC (ref 9.7–11.8)
RBC # BLD: 3.45 MIL/MCL (ref 4–5.2)
WBC # BLD: 4.6 K/MCL (ref 4.2–11)

## 2017-05-14 PROCEDURE — 97530 THERAPEUTIC ACTIVITIES: CPT

## 2017-05-14 PROCEDURE — 10004651 HB RX, NO CHARGE ITEM: Performed by: INTERNAL MEDICINE

## 2017-05-14 PROCEDURE — 10002800 HB RX 250 W HCPCS: Performed by: INTERNAL MEDICINE

## 2017-05-14 PROCEDURE — 85730 THROMBOPLASTIN TIME PARTIAL: CPT

## 2017-05-14 PROCEDURE — 10003445 HB TELEMETRY PER DAY

## 2017-05-14 PROCEDURE — 97116 GAIT TRAINING THERAPY: CPT

## 2017-05-14 PROCEDURE — 10002800 HB RX 250 W HCPCS: Performed by: NURSE PRACTITIONER

## 2017-05-14 PROCEDURE — 85027 COMPLETE CBC AUTOMATED: CPT

## 2017-05-14 PROCEDURE — 10002803 HB RX 637: Performed by: INTERNAL MEDICINE

## 2017-05-14 PROCEDURE — 99232 SBSQ HOSP IP/OBS MODERATE 35: CPT | Performed by: HOSPITALIST

## 2017-05-14 PROCEDURE — 10004651 HB RX, NO CHARGE ITEM: Performed by: NURSE PRACTITIONER

## 2017-05-14 PROCEDURE — 85610 PROTHROMBIN TIME: CPT

## 2017-05-14 PROCEDURE — 10002803 HB RX 637: Performed by: NURSE PRACTITIONER

## 2017-05-14 PROCEDURE — 10000002 HB ROOM CHARGE MED SURG

## 2017-05-14 PROCEDURE — 10004173 HB COUNTER-THERAPY VISIT PT

## 2017-05-14 RX ORDER — WARFARIN SODIUM 6 MG/1
12 TABLET ORAL ONCE
Status: COMPLETED | OUTPATIENT
Start: 2017-05-14 | End: 2017-05-14

## 2017-05-14 RX ADMIN — CLONAZEPAM 0.5 MG: 0.5 TABLET ORAL at 22:03

## 2017-05-14 RX ADMIN — Medication 10 ML: at 05:19

## 2017-05-14 RX ADMIN — HEPARIN SODIUM AND DEXTROSE 11 UNITS/KG/HR: 5000; 5 INJECTION INTRAVENOUS at 20:50

## 2017-05-14 RX ADMIN — EPOPROSTENOL 7 NG/KG/MIN: 1.5 INJECTION, POWDER, LYOPHILIZED, FOR SOLUTION INTRAVENOUS at 13:42

## 2017-05-14 RX ADMIN — Medication 1 EACH: at 18:48

## 2017-05-14 RX ADMIN — PAROXETINE 20 MG: 20 TABLET, FILM COATED ORAL at 13:04

## 2017-05-14 RX ADMIN — ASPIRIN 81 MG: 81 TABLET ORAL at 13:04

## 2017-05-14 RX ADMIN — OLANZAPINE 0.62 MG: 2.5 TABLET, FILM COATED ORAL at 22:26

## 2017-05-14 RX ADMIN — WARFARIN SODIUM 12 MG: 6 TABLET ORAL at 18:47

## 2017-05-14 ASSESSMENT — PAIN SCALES - GENERAL
PAIN_LEVEL_AT_REST: 4

## 2017-05-15 LAB
APTT PPP: 63 SEC (ref 22–30)
INR PPP: 1.8
PROTHROMBIN TIME: 19.6 SEC (ref 9.7–11.8)

## 2017-05-15 PROCEDURE — 85610 PROTHROMBIN TIME: CPT

## 2017-05-15 PROCEDURE — 10002800 HB RX 250 W HCPCS: Performed by: INTERNAL MEDICINE

## 2017-05-15 PROCEDURE — 10002803 HB RX 637: Performed by: INTERNAL MEDICINE

## 2017-05-15 PROCEDURE — 10002800 HB RX 250 W HCPCS: Performed by: NURSE PRACTITIONER

## 2017-05-15 PROCEDURE — 99233 SBSQ HOSP IP/OBS HIGH 50: CPT | Performed by: INTERNAL MEDICINE

## 2017-05-15 PROCEDURE — 85730 THROMBOPLASTIN TIME PARTIAL: CPT

## 2017-05-15 PROCEDURE — 10000002 HB ROOM CHARGE MED SURG

## 2017-05-15 PROCEDURE — 99232 SBSQ HOSP IP/OBS MODERATE 35: CPT | Performed by: NURSE PRACTITIONER

## 2017-05-15 PROCEDURE — 10004651 HB RX, NO CHARGE ITEM: Performed by: INTERNAL MEDICINE

## 2017-05-15 PROCEDURE — 10004651 HB RX, NO CHARGE ITEM: Performed by: NURSE PRACTITIONER

## 2017-05-15 PROCEDURE — 10002803 HB RX 637: Performed by: NURSE PRACTITIONER

## 2017-05-15 PROCEDURE — 99232 SBSQ HOSP IP/OBS MODERATE 35: CPT | Performed by: HOSPITALIST

## 2017-05-15 RX ORDER — WARFARIN SODIUM 6 MG/1
12 TABLET ORAL ONCE
Status: COMPLETED | OUTPATIENT
Start: 2017-05-15 | End: 2017-05-15

## 2017-05-15 RX ADMIN — HEPARIN SODIUM AND DEXTROSE 11 UNITS/KG/HR: 5000; 5 INJECTION INTRAVENOUS at 21:33

## 2017-05-15 RX ADMIN — WARFARIN SODIUM 12 MG: 6 TABLET ORAL at 18:06

## 2017-05-15 RX ADMIN — PAROXETINE 20 MG: 20 TABLET, FILM COATED ORAL at 13:21

## 2017-05-15 RX ADMIN — Medication 10 ML: at 05:35

## 2017-05-15 RX ADMIN — CLONAZEPAM 0.5 MG: 0.5 TABLET ORAL at 21:32

## 2017-05-15 RX ADMIN — OLANZAPINE 0.62 MG: 2.5 TABLET, FILM COATED ORAL at 21:32

## 2017-05-15 RX ADMIN — ERYTHROPOIETIN 40000 UNITS: 40000 INJECTION, SOLUTION INTRAVENOUS; SUBCUTANEOUS at 21:35

## 2017-05-15 RX ADMIN — ASPIRIN 81 MG: 81 TABLET ORAL at 13:21

## 2017-05-15 RX ADMIN — EPOPROSTENOL 7 NG/KG/MIN: 1.5 INJECTION, POWDER, LYOPHILIZED, FOR SOLUTION INTRAVENOUS at 13:23

## 2017-05-15 RX ADMIN — Medication 1 EACH: at 18:07

## 2017-05-15 ASSESSMENT — PAIN SCALES - GENERAL
PAIN_LEVEL_AT_REST: 4

## 2017-05-16 LAB
APTT PPP: 76 SEC (ref 22–30)
ERYTHROCYTE [DISTWIDTH] IN BLOOD: 20 % (ref 11–15)
HCT VFR BLD CALC: 30.8 % (ref 36–46.5)
HGB BLD-MCNC: 9.5 G/DL (ref 12–15.5)
INR PPP: 2.3
MCH RBC QN AUTO: 25.8 PG (ref 26–34)
MCHC RBC AUTO-ENTMCNC: 30.8 G/DL (ref 32–36.5)
MCV RBC AUTO: 83.7 FL (ref 78–100)
PLATELET # BLD: 344 K/MCL (ref 140–450)
PROTHROMBIN TIME: 25.1 SEC (ref 9.7–11.8)
RBC # BLD: 3.68 MIL/MCL (ref 4–5.2)
WBC # BLD: 4.3 K/MCL (ref 4.2–11)

## 2017-05-16 PROCEDURE — 85730 THROMBOPLASTIN TIME PARTIAL: CPT

## 2017-05-16 PROCEDURE — 10002800 HB RX 250 W HCPCS: Performed by: NURSE PRACTITIONER

## 2017-05-16 PROCEDURE — 99232 SBSQ HOSP IP/OBS MODERATE 35: CPT | Performed by: INTERNAL MEDICINE

## 2017-05-16 PROCEDURE — 85027 COMPLETE CBC AUTOMATED: CPT

## 2017-05-16 PROCEDURE — 10004651 HB RX, NO CHARGE ITEM: Performed by: NURSE PRACTITIONER

## 2017-05-16 PROCEDURE — 85610 PROTHROMBIN TIME: CPT

## 2017-05-16 PROCEDURE — 10000002 HB ROOM CHARGE MED SURG

## 2017-05-16 PROCEDURE — 10002803 HB RX 637: Performed by: INTERNAL MEDICINE

## 2017-05-16 PROCEDURE — 10004651 HB RX, NO CHARGE ITEM: Performed by: INTERNAL MEDICINE

## 2017-05-16 RX ORDER — WARFARIN SODIUM 10 MG/1
10 TABLET ORAL ONCE
Status: COMPLETED | OUTPATIENT
Start: 2017-05-16 | End: 2017-05-16

## 2017-05-16 RX ADMIN — PAROXETINE 20 MG: 20 TABLET, FILM COATED ORAL at 13:16

## 2017-05-16 RX ADMIN — OLANZAPINE 0.62 MG: 2.5 TABLET, FILM COATED ORAL at 21:39

## 2017-05-16 RX ADMIN — ASPIRIN 81 MG: 81 TABLET ORAL at 13:16

## 2017-05-16 RX ADMIN — WARFARIN SODIUM 10 MG: 10 TABLET ORAL at 17:48

## 2017-05-16 RX ADMIN — Medication 1 EACH: at 17:48

## 2017-05-16 RX ADMIN — Medication 10 ML: at 05:31

## 2017-05-16 RX ADMIN — Medication 10 ML: at 21:30

## 2017-05-16 RX ADMIN — EPOPROSTENOL 7 NG/KG/MIN: 1.5 INJECTION, POWDER, LYOPHILIZED, FOR SOLUTION INTRAVENOUS at 13:16

## 2017-05-16 RX ADMIN — Medication 10 ML: at 13:18

## 2017-05-16 RX ADMIN — CLONAZEPAM 0.5 MG: 0.5 TABLET ORAL at 21:39

## 2017-05-16 ASSESSMENT — PAIN SCALES - GENERAL
PAIN_LEVEL_AT_REST: 4

## 2017-05-17 VITALS
DIASTOLIC BLOOD PRESSURE: 83 MMHG | BODY MASS INDEX: 27.05 KG/M2 | OXYGEN SATURATION: 94 % | HEIGHT: 64 IN | WEIGHT: 158.42 LBS | RESPIRATION RATE: 18 BRPM | TEMPERATURE: 97.8 F | HEART RATE: 96 BPM | SYSTOLIC BLOOD PRESSURE: 124 MMHG

## 2017-05-17 LAB
INR PPP: 2.5
PROTHROMBIN TIME: 27.1 SEC (ref 9.7–11.8)

## 2017-05-17 PROCEDURE — 10002803 HB RX 637: Performed by: INTERNAL MEDICINE

## 2017-05-17 PROCEDURE — 85610 PROTHROMBIN TIME: CPT

## 2017-05-17 PROCEDURE — 99239 HOSP IP/OBS DSCHRG MGMT >30: CPT | Performed by: INTERNAL MEDICINE

## 2017-05-17 PROCEDURE — 10004651 HB RX, NO CHARGE ITEM: Performed by: NURSE PRACTITIONER

## 2017-05-17 PROCEDURE — 10004651 HB RX, NO CHARGE ITEM: Performed by: INTERNAL MEDICINE

## 2017-05-17 RX ORDER — EPOPROSTENOL 0.5 MG/10ML
INJECTION, POWDER, LYOPHILIZED, FOR SOLUTION INTRAVENOUS
Qty: 1 EACH | Refills: 0 | Status: ON HOLD | INPATIENT
Start: 2017-05-17 | End: 2017-08-01 | Stop reason: HOSPADM

## 2017-05-17 RX ORDER — WARFARIN SODIUM 10 MG/1
10 TABLET ORAL DAILY
Qty: 30 TABLET | Refills: 0 | Status: SHIPPED | OUTPATIENT
Start: 2017-05-17 | End: 2017-06-15 | Stop reason: SDUPTHER

## 2017-05-17 RX ADMIN — PAROXETINE 20 MG: 20 TABLET, FILM COATED ORAL at 13:21

## 2017-05-17 RX ADMIN — Medication 10 ML: at 05:43

## 2017-05-17 RX ADMIN — ASPIRIN 81 MG: 81 TABLET ORAL at 13:21

## 2017-05-17 ASSESSMENT — PAIN SCALES - GENERAL
PAIN_LEVEL_AT_REST: 4
PAIN_LEVEL_AT_REST: 4

## 2017-05-18 ENCOUNTER — TELEPHONE (OUTPATIENT)
Dept: TRANSPLANT | Age: 67
End: 2017-05-18

## 2017-05-18 ENCOUNTER — TELEPHONE (OUTPATIENT)
Dept: FAMILY MEDICINE | Age: 67
End: 2017-05-18

## 2017-05-18 ENCOUNTER — OFFICE VISIT (OUTPATIENT)
Dept: FAMILY MEDICINE | Age: 67
End: 2017-05-18

## 2017-05-18 VITALS
RESPIRATION RATE: 16 BRPM | DIASTOLIC BLOOD PRESSURE: 78 MMHG | BODY MASS INDEX: 27.14 KG/M2 | WEIGHT: 159 LBS | TEMPERATURE: 98.1 F | HEIGHT: 64 IN | SYSTOLIC BLOOD PRESSURE: 122 MMHG | HEART RATE: 96 BPM | OXYGEN SATURATION: 96 %

## 2017-05-18 DIAGNOSIS — I27.20 PULMONARY HYPERTENSION (CMD): ICD-10-CM

## 2017-05-18 DIAGNOSIS — I26.09 OTHER ACUTE PULMONARY EMBOLISM WITH ACUTE COR PULMONALE (CMD): ICD-10-CM

## 2017-05-18 DIAGNOSIS — D64.9 ANEMIA, UNSPECIFIED TYPE: ICD-10-CM

## 2017-05-18 DIAGNOSIS — I26.09 OTHER ACUTE PULMONARY EMBOLISM WITH ACUTE COR PULMONALE (CMD): Primary | ICD-10-CM

## 2017-05-18 PROCEDURE — 99496 TRANSJ CARE MGMT HIGH F2F 7D: CPT | Performed by: FAMILY MEDICINE

## 2017-05-19 ENCOUNTER — TELEPHONE (OUTPATIENT)
Dept: FAMILY MEDICINE | Age: 67
End: 2017-05-19

## 2017-05-19 ENCOUNTER — LAB SERVICES (OUTPATIENT)
Dept: LAB | Age: 67
End: 2017-05-19

## 2017-05-19 DIAGNOSIS — I26.09 OTHER ACUTE PULMONARY EMBOLISM WITH ACUTE COR PULMONALE (CMD): ICD-10-CM

## 2017-05-19 PROBLEM — D63.1 ERYTHROPOIETIN DEFICIENCY ANEMIA: Status: ACTIVE | Noted: 2017-05-19

## 2017-05-19 PROBLEM — D63.8 ANEMIA OF CHRONIC DISEASE: Status: ACTIVE | Noted: 2017-05-19

## 2017-05-19 LAB
INR PPP: 2.3
PROTHROMBIN TIME: 25.4 SEC (ref 9.7–11.8)

## 2017-05-19 PROCEDURE — 36415 COLL VENOUS BLD VENIPUNCTURE: CPT | Performed by: INTERNAL MEDICINE

## 2017-05-19 PROCEDURE — 85610 PROTHROMBIN TIME: CPT | Performed by: INTERNAL MEDICINE

## 2017-05-22 ENCOUNTER — TELEPHONE (OUTPATIENT)
Dept: HEMATOLOGY/ONCOLOGY | Age: 67
End: 2017-05-22

## 2017-05-22 ENCOUNTER — APPOINTMENT (OUTPATIENT)
Dept: TRANSPLANT | Age: 67
End: 2017-05-22
Attending: INTERNAL MEDICINE

## 2017-05-22 ENCOUNTER — LAB SERVICES (OUTPATIENT)
Dept: HEMATOLOGY/ONCOLOGY | Age: 67
End: 2017-05-22
Attending: INTERNAL MEDICINE

## 2017-05-22 ENCOUNTER — NURSE ONLY (OUTPATIENT)
Dept: HEMATOLOGY/ONCOLOGY | Age: 67
End: 2017-05-22
Attending: INTERNAL MEDICINE

## 2017-05-22 DIAGNOSIS — D63.1 ERYTHROPOIETIN DEFICIENCY ANEMIA: ICD-10-CM

## 2017-05-22 DIAGNOSIS — D63.8 ANEMIA OF CHRONIC DISEASE: ICD-10-CM

## 2017-05-22 DIAGNOSIS — D63.1 ERYTHROPOIETIN DEFICIENCY ANEMIA: Primary | ICD-10-CM

## 2017-05-22 LAB
BASOPHILS # BLD AUTO: 0 K/MCL (ref 0–0.3)
BASOPHILS NFR BLD AUTO: 1 %
DIFFERENTIAL METHOD BLD: ABNORMAL
EOSINOPHIL # BLD AUTO: 0.2 K/MCL (ref 0.1–0.5)
EOSINOPHIL NFR SPEC: 6 %
ERYTHROCYTE [DISTWIDTH] IN BLOOD: 19.7 % (ref 11–15)
FERRITIN SERPL-MCNC: 106 NG/ML (ref 8–252)
HCT VFR BLD CALC: 29.7 % (ref 36–46.5)
HGB BLD-MCNC: 9 G/DL (ref 12–15.5)
IRON SATN MFR SERPL: 7 % (ref 15–45)
IRON SERPL-MCNC: 27 MCG/DL (ref 50–170)
LYMPHOCYTES # BLD MANUAL: 1.1 K/MCL (ref 1–4)
LYMPHOCYTES NFR BLD MANUAL: 28 %
MCH RBC QN AUTO: 25.7 PG (ref 26–34)
MCHC RBC AUTO-ENTMCNC: 30.3 G/DL (ref 32–36.5)
MCV RBC AUTO: 84.9 FL (ref 78–100)
MONOCYTES # BLD MANUAL: 0.3 K/MCL (ref 0.3–0.9)
MONOCYTES NFR BLD MANUAL: 9 %
NEUTROPHILS # BLD AUTO: 2.1 K/MCL (ref 1.8–7.7)
NEUTROPHILS NFR BLD AUTO: 56 %
PLATELET # BLD: 404 K/MCL (ref 140–450)
RBC # BLD: 3.5 MIL/MCL (ref 4–5.2)
TIBC SERPL-MCNC: 365 MCG/DL (ref 250–450)
WBC # BLD: 3.8 K/MCL (ref 4.2–11)

## 2017-05-22 PROCEDURE — 83540 ASSAY OF IRON: CPT

## 2017-05-22 PROCEDURE — 99211 OFF/OP EST MAY X REQ PHY/QHP: CPT

## 2017-05-22 PROCEDURE — 85025 COMPLETE CBC W/AUTO DIFF WBC: CPT

## 2017-05-22 PROCEDURE — 36415 COLL VENOUS BLD VENIPUNCTURE: CPT

## 2017-05-22 PROCEDURE — 82728 ASSAY OF FERRITIN: CPT

## 2017-05-22 ASSESSMENT — PAIN SCALES - GENERAL: PAINLEVEL: 0

## 2017-05-23 ENCOUNTER — TELEPHONE (OUTPATIENT)
Dept: HEMATOLOGY/ONCOLOGY | Age: 67
End: 2017-05-23

## 2017-05-24 ENCOUNTER — TELEPHONE (OUTPATIENT)
Dept: HEMATOLOGY/ONCOLOGY | Age: 67
End: 2017-05-24

## 2017-05-24 ENCOUNTER — OFFICE VISIT (OUTPATIENT)
Dept: TRANSPLANT | Age: 67
End: 2017-05-24
Attending: INTERNAL MEDICINE

## 2017-05-24 VITALS
SYSTOLIC BLOOD PRESSURE: 135 MMHG | HEART RATE: 101 BPM | WEIGHT: 164.5 LBS | DIASTOLIC BLOOD PRESSURE: 73 MMHG | BODY MASS INDEX: 28.09 KG/M2 | HEIGHT: 64 IN | OXYGEN SATURATION: 98 %

## 2017-05-24 DIAGNOSIS — I27.20 PULMONARY HYPERTENSION (CMD): Primary | ICD-10-CM

## 2017-05-24 PROCEDURE — 10004187 HB COUNTER-VISIT CENSUS, OUTPATIENT

## 2017-05-24 PROCEDURE — 99496 TRANSJ CARE MGMT HIGH F2F 7D: CPT | Performed by: INTERNAL MEDICINE

## 2017-05-24 PROCEDURE — 99213 OFFICE O/P EST LOW 20 MIN: CPT

## 2017-05-26 ENCOUNTER — TELEPHONE (OUTPATIENT)
Dept: HEMATOLOGY/ONCOLOGY | Age: 67
End: 2017-05-26

## 2017-05-26 ENCOUNTER — TELEPHONE (OUTPATIENT)
Dept: FAMILY MEDICINE | Age: 67
End: 2017-05-26

## 2017-05-26 ENCOUNTER — LAB SERVICES (OUTPATIENT)
Dept: LAB | Age: 67
End: 2017-05-26

## 2017-05-26 ENCOUNTER — TELEPHONE (OUTPATIENT)
Dept: TRANSPLANT | Age: 67
End: 2017-05-26

## 2017-05-26 DIAGNOSIS — D63.8 ANEMIA OF CHRONIC DISEASE: Primary | ICD-10-CM

## 2017-05-26 DIAGNOSIS — I26.09 OTHER ACUTE PULMONARY EMBOLISM WITH ACUTE COR PULMONALE (CMD): ICD-10-CM

## 2017-05-26 LAB
INR PPP: 3.1
PROTHROMBIN TIME: 34.3 SEC (ref 9.7–11.8)

## 2017-05-26 PROCEDURE — 85610 PROTHROMBIN TIME: CPT | Performed by: INTERNAL MEDICINE

## 2017-05-26 PROCEDURE — 36415 COLL VENOUS BLD VENIPUNCTURE: CPT | Performed by: INTERNAL MEDICINE

## 2017-05-30 ENCOUNTER — APPOINTMENT (OUTPATIENT)
Dept: HEMATOLOGY/ONCOLOGY | Age: 67
End: 2017-05-30
Attending: INTERNAL MEDICINE

## 2017-05-30 ENCOUNTER — APPOINTMENT (OUTPATIENT)
Dept: HEMATOLOGY/ONCOLOGY | Age: 67
End: 2017-05-30
Attending: FAMILY MEDICINE

## 2017-06-02 ENCOUNTER — TELEPHONE (OUTPATIENT)
Dept: FAMILY MEDICINE | Age: 67
End: 2017-06-02

## 2017-06-02 ENCOUNTER — LAB SERVICES (OUTPATIENT)
Dept: LAB | Age: 67
End: 2017-06-02

## 2017-06-02 DIAGNOSIS — I26.09 OTHER ACUTE PULMONARY EMBOLISM WITH ACUTE COR PULMONALE (CMD): ICD-10-CM

## 2017-06-02 LAB
INR PPP: 2.1
PROTHROMBIN TIME: 22.6 SEC (ref 9.7–11.8)

## 2017-06-02 PROCEDURE — 36415 COLL VENOUS BLD VENIPUNCTURE: CPT | Performed by: INTERNAL MEDICINE

## 2017-06-02 PROCEDURE — 85610 PROTHROMBIN TIME: CPT | Performed by: INTERNAL MEDICINE

## 2017-06-05 ENCOUNTER — NURSE TRIAGE (OUTPATIENT)
Dept: FAMILY MEDICINE | Age: 67
End: 2017-06-05

## 2017-06-05 ENCOUNTER — APPOINTMENT (OUTPATIENT)
Dept: HEMATOLOGY/ONCOLOGY | Age: 67
End: 2017-06-05
Attending: INTERNAL MEDICINE

## 2017-06-05 ENCOUNTER — TELEPHONE (OUTPATIENT)
Dept: FAMILY MEDICINE | Age: 67
End: 2017-06-05

## 2017-06-05 DIAGNOSIS — K92.1 BLACK STOOLS: Primary | ICD-10-CM

## 2017-06-07 ENCOUNTER — HOSPITAL ENCOUNTER (OUTPATIENT)
Dept: NUCLEAR MEDICINE | Age: 67
Discharge: HOME | End: 2017-06-07
Attending: INTERNAL MEDICINE

## 2017-06-07 ENCOUNTER — HOSPITAL ENCOUNTER (OUTPATIENT)
Dept: GENERAL RADIOLOGY | Age: 67
Discharge: HOME | End: 2017-06-07
Attending: INTERNAL MEDICINE

## 2017-06-07 ENCOUNTER — HOSPITAL ENCOUNTER (OUTPATIENT)
Dept: LAB | Age: 67
Discharge: HOME | End: 2017-06-07
Attending: INTERNAL MEDICINE

## 2017-06-07 ENCOUNTER — TELEPHONE (OUTPATIENT)
Dept: FAMILY MEDICINE | Age: 67
End: 2017-06-07

## 2017-06-07 DIAGNOSIS — K92.1 BLACK STOOLS: ICD-10-CM

## 2017-06-07 DIAGNOSIS — I27.20 PULMONARY HYPERTENSION (CMD): ICD-10-CM

## 2017-06-07 DIAGNOSIS — K92.1 BLACK STOOLS: Primary | ICD-10-CM

## 2017-06-07 LAB
BASOPHILS # BLD AUTO: 0 K/MCL (ref 0–0.3)
BASOPHILS NFR BLD AUTO: 1 %
DIFFERENTIAL METHOD BLD: ABNORMAL
EOSINOPHIL # BLD AUTO: 0.4 K/MCL (ref 0.1–0.5)
EOSINOPHIL NFR SPEC: 8 %
ERYTHROCYTE [DISTWIDTH] IN BLOOD: 18.8 % (ref 11–15)
HCT VFR BLD CALC: 32.4 % (ref 36–46.5)
HGB BLD-MCNC: 9.8 G/DL (ref 12–15.5)
LYMPHOCYTES # BLD MANUAL: 2 K/MCL (ref 1–4)
LYMPHOCYTES NFR BLD MANUAL: 39 %
MCH RBC QN AUTO: 24.5 PG (ref 26–34)
MCHC RBC AUTO-ENTMCNC: 30.2 G/DL (ref 32–36.5)
MCV RBC AUTO: 81 FL (ref 78–100)
MONOCYTES # BLD MANUAL: 0.3 K/MCL (ref 0.3–0.9)
MONOCYTES NFR BLD MANUAL: 6 %
NEUTROPHILS # BLD AUTO: 2.4 K/MCL (ref 1.8–7.7)
NEUTROPHILS NFR BLD AUTO: 46 %
PLATELET # BLD: 430 K/MCL (ref 140–450)
RBC # BLD: 4 MIL/MCL (ref 4–5.2)
WBC # BLD: 5.2 K/MCL (ref 4.2–11)

## 2017-06-07 PROCEDURE — 78582 LUNG VENTILAT&PERFUS IMAGING: CPT | Performed by: RADIOLOGY

## 2017-06-07 PROCEDURE — 71020 XR CHEST PA AND LATERAL: CPT | Performed by: RADIOLOGY

## 2017-06-07 PROCEDURE — 36415 COLL VENOUS BLD VENIPUNCTURE: CPT

## 2017-06-07 PROCEDURE — A9567 TECHNETIUM TC-99M AEROSOL: HCPCS

## 2017-06-07 PROCEDURE — 71020 XR CHEST PA AND LATERAL: CPT

## 2017-06-07 PROCEDURE — 85025 COMPLETE CBC W/AUTO DIFF WBC: CPT

## 2017-06-09 ENCOUNTER — TELEPHONE (OUTPATIENT)
Dept: TRANSPLANT | Age: 67
End: 2017-06-09

## 2017-06-09 ENCOUNTER — TELEPHONE (OUTPATIENT)
Dept: FAMILY MEDICINE | Age: 67
End: 2017-06-09

## 2017-06-09 ENCOUNTER — LAB SERVICES (OUTPATIENT)
Dept: LAB | Age: 67
End: 2017-06-09

## 2017-06-09 DIAGNOSIS — I26.09 OTHER ACUTE PULMONARY EMBOLISM WITH ACUTE COR PULMONALE (CMD): ICD-10-CM

## 2017-06-09 DIAGNOSIS — Z86.711 HX PULMONARY EMBOLISM: Primary | ICD-10-CM

## 2017-06-09 DIAGNOSIS — I26.09 OTHER PULMONARY EMBOLISM WITH ACUTE COR PULMONALE, UNSPECIFIED CHRONICITY (CMD): ICD-10-CM

## 2017-06-09 LAB
INR PPP: 2
PROTHROMBIN TIME: 22.3 SEC (ref 9.7–11.8)

## 2017-06-09 PROCEDURE — 36415 COLL VENOUS BLD VENIPUNCTURE: CPT | Performed by: INTERNAL MEDICINE

## 2017-06-09 PROCEDURE — 85610 PROTHROMBIN TIME: CPT | Performed by: INTERNAL MEDICINE

## 2017-06-12 ENCOUNTER — APPOINTMENT (OUTPATIENT)
Dept: HEMATOLOGY/ONCOLOGY | Age: 67
End: 2017-06-12

## 2017-06-13 ENCOUNTER — APPOINTMENT (OUTPATIENT)
Dept: CV DIAGNOSTICS | Age: 67
End: 2017-06-13
Attending: INTERNAL MEDICINE

## 2017-06-13 ENCOUNTER — LAB SERVICES (OUTPATIENT)
Dept: HEMATOLOGY/ONCOLOGY | Age: 67
End: 2017-06-13
Attending: INTERNAL MEDICINE

## 2017-06-13 ENCOUNTER — APPOINTMENT (OUTPATIENT)
Dept: HEMATOLOGY/ONCOLOGY | Age: 67
End: 2017-06-13

## 2017-06-13 ENCOUNTER — OFFICE VISIT (OUTPATIENT)
Dept: HEMATOLOGY/ONCOLOGY | Age: 67
End: 2017-06-13
Attending: INTERNAL MEDICINE

## 2017-06-13 ENCOUNTER — APPOINTMENT (OUTPATIENT)
Dept: TRANSPLANT | Age: 67
End: 2017-06-13
Attending: PSYCHOLOGIST

## 2017-06-13 ENCOUNTER — TELEPHONE (OUTPATIENT)
Dept: HEMATOLOGY/ONCOLOGY | Age: 67
End: 2017-06-13

## 2017-06-13 DIAGNOSIS — D50.9 IRON DEFICIENCY ANEMIA, UNSPECIFIED IRON DEFICIENCY ANEMIA TYPE: ICD-10-CM

## 2017-06-13 DIAGNOSIS — D63.1 ERYTHROPOIETIN DEFICIENCY ANEMIA: Primary | ICD-10-CM

## 2017-06-13 DIAGNOSIS — D63.1 ERYTHROPOIETIN DEFICIENCY ANEMIA: ICD-10-CM

## 2017-06-13 DIAGNOSIS — D63.8 ANEMIA OF CHRONIC DISEASE: ICD-10-CM

## 2017-06-13 DIAGNOSIS — I26.09 OTHER PULMONARY EMBOLISM WITH ACUTE COR PULMONALE, UNSPECIFIED CHRONICITY (CMD): ICD-10-CM

## 2017-06-13 DIAGNOSIS — I27.20 PULMONARY HYPERTENSION (CMD): Primary | ICD-10-CM

## 2017-06-13 LAB
ALBUMIN SERPL-MCNC: 3.2 G/DL (ref 3.6–5.1)
ALBUMIN/GLOB SERPL: 0.8 {RATIO} (ref 1–2.4)
ALP SERPL-CCNC: 90 UNITS/L (ref 45–117)
ALT SERPL-CCNC: 21 UNITS/L
ANION GAP SERPL CALC-SCNC: 15 MMOL/L (ref 10–20)
AST SERPL-CCNC: 21 UNITS/L
BASOPHILS # BLD AUTO: 0.1 K/MCL (ref 0–0.3)
BASOPHILS NFR BLD AUTO: 1 %
BILIRUB SERPL-MCNC: 0.6 MG/DL (ref 0.2–1)
BUN SERPL-MCNC: 13 MG/DL (ref 6–20)
BUN/CREAT SERPL: 19 (ref 7–25)
CALCIUM SERPL-MCNC: 8.5 MG/DL (ref 8.4–10.2)
CHLORIDE SERPL-SCNC: 107 MMOL/L (ref 98–107)
CO2 SERPL-SCNC: 24 MMOL/L (ref 21–32)
CREAT SERPL-MCNC: 0.7 MG/DL (ref 0.51–0.95)
DIFFERENTIAL METHOD BLD: ABNORMAL
EOSINOPHIL # BLD AUTO: 0.4 K/MCL (ref 0.1–0.5)
EOSINOPHIL NFR SPEC: 10 %
ERYTHROCYTE [DISTWIDTH] IN BLOOD: 18.7 % (ref 11–15)
FASTING STATUS PATIENT QL REPORTED: ABNORMAL HRS
FERRITIN SERPL-MCNC: 21 NG/ML (ref 8–252)
GLOBULIN SER-MCNC: 3.8 G/DL (ref 2–4)
GLUCOSE SERPL-MCNC: 114 MG/DL (ref 65–99)
HCT VFR BLD CALC: 32.5 % (ref 36–46.5)
HGB BLD-MCNC: 9.6 G/DL (ref 12–15.5)
IMM RETICS NFR: 20.4 % (ref 1.5–16)
IRON SATN MFR SERPL: 9 % (ref 15–45)
IRON SERPL-MCNC: 39 MCG/DL (ref 50–170)
LYMPHOCYTES # BLD MANUAL: 1.5 K/MCL (ref 1–4)
LYMPHOCYTES NFR BLD MANUAL: 35 %
MCH RBC QN AUTO: 24.1 PG (ref 26–34)
MCHC RBC AUTO-ENTMCNC: 29.5 G/DL (ref 32–36.5)
MCV RBC AUTO: 81.5 FL (ref 78–100)
MONOCYTES # BLD MANUAL: 0.4 K/MCL (ref 0.3–0.9)
MONOCYTES NFR BLD MANUAL: 8 %
NEUTROPHILS # BLD AUTO: 1.9 K/MCL (ref 1.8–7.7)
NEUTROPHILS NFR BLD AUTO: 46 %
PLATELET # BLD: 422 K/MCL (ref 140–450)
POTASSIUM SERPL-SCNC: 3.7 MMOL/L (ref 3.4–5.1)
PROT SERPL-MCNC: 7 G/DL (ref 6.4–8.2)
RBC # BLD: 3.99 MIL/MCL (ref 4–5.2)
RETICS #: 53 K/MCL (ref 10–120)
RETICS/RBC NFR AUTO: 1.4 % (ref 0.3–2.5)
SODIUM SERPL-SCNC: 142 MMOL/L (ref 135–145)
TIBC SERPL-MCNC: 429 MCG/DL (ref 250–450)
WBC # BLD: 4.2 K/MCL (ref 4.2–11)

## 2017-06-13 PROCEDURE — 99214 OFFICE O/P EST MOD 30 MIN: CPT | Performed by: INTERNAL MEDICINE

## 2017-06-13 ASSESSMENT — PAIN SCALES - GENERAL: PAINLEVEL: 3-4

## 2017-06-14 ENCOUNTER — TELEPHONE (OUTPATIENT)
Dept: HEMATOLOGY/ONCOLOGY | Age: 67
End: 2017-06-14

## 2017-06-14 DIAGNOSIS — D64.9 ANEMIA, UNSPECIFIED TYPE: Primary | ICD-10-CM

## 2017-06-15 ENCOUNTER — TELEPHONE (OUTPATIENT)
Dept: FAMILY MEDICINE | Age: 67
End: 2017-06-15

## 2017-06-15 RX ORDER — WARFARIN SODIUM 10 MG/1
10 TABLET ORAL DAILY
Qty: 30 TABLET | Refills: 0 | Status: SHIPPED | OUTPATIENT
Start: 2017-06-15 | End: 2017-06-15 | Stop reason: SDUPTHER

## 2017-06-15 RX ORDER — WARFARIN SODIUM 10 MG/1
TABLET ORAL
Qty: 90 TABLET | Refills: 0 | Status: SHIPPED | OUTPATIENT
Start: 2017-06-15 | End: 2017-07-12 | Stop reason: SDUPTHER

## 2017-06-16 ENCOUNTER — LAB SERVICES (OUTPATIENT)
Dept: LAB | Age: 67
End: 2017-06-16

## 2017-06-16 ENCOUNTER — TELEPHONE (OUTPATIENT)
Dept: TRANSPLANT | Age: 67
End: 2017-06-16

## 2017-06-16 ENCOUNTER — TELEPHONE (OUTPATIENT)
Dept: FAMILY MEDICINE | Age: 67
End: 2017-06-16

## 2017-06-16 ENCOUNTER — TELEPHONE (OUTPATIENT)
Dept: PULMONOLOGY | Age: 67
End: 2017-06-16

## 2017-06-16 ENCOUNTER — TELEPHONE (OUTPATIENT)
Dept: INTERVENTIONAL RADIOLOGY/VASCULAR | Age: 67
End: 2017-06-16

## 2017-06-16 DIAGNOSIS — G47.34 HYPOXIA, SLEEP RELATED: Primary | ICD-10-CM

## 2017-06-16 DIAGNOSIS — I27.20 PULMONARY HTN (CMD): ICD-10-CM

## 2017-06-16 DIAGNOSIS — I26.09 OTHER ACUTE PULMONARY EMBOLISM WITH ACUTE COR PULMONALE (CMD): ICD-10-CM

## 2017-06-16 DIAGNOSIS — I26.09 OTHER PULMONARY EMBOLISM WITH ACUTE COR PULMONALE, UNSPECIFIED CHRONICITY (CMD): ICD-10-CM

## 2017-06-16 LAB
INR PPP: 1.8
PROTHROMBIN TIME: 20.1 SEC (ref 9.7–11.8)

## 2017-06-16 PROCEDURE — 36415 COLL VENOUS BLD VENIPUNCTURE: CPT | Performed by: INTERNAL MEDICINE

## 2017-06-16 PROCEDURE — 85610 PROTHROMBIN TIME: CPT | Performed by: INTERNAL MEDICINE

## 2017-06-19 RX ORDER — SODIUM CHLORIDE 9 MG/ML
INJECTION, SOLUTION INTRAVENOUS CONTINUOUS
Status: CANCELLED | OUTPATIENT
Start: 2017-06-19

## 2017-06-19 RX ORDER — 0.9 % SODIUM CHLORIDE 0.9 %
2 VIAL (ML) INJECTION PRN
Status: CANCELLED | OUTPATIENT
Start: 2017-06-19

## 2017-06-19 RX ORDER — 0.9 % SODIUM CHLORIDE 0.9 %
2 VIAL (ML) INJECTION EVERY 12 HOURS SCHEDULED
Status: CANCELLED | OUTPATIENT
Start: 2017-06-19

## 2017-06-20 ENCOUNTER — FOLLOW UP (OUTPATIENT)
Dept: TRANSPLANT | Age: 67
End: 2017-06-20
Attending: PHYSICIAN ASSISTANT

## 2017-06-20 ENCOUNTER — OFFICE VISIT (OUTPATIENT)
Dept: PULMONOLOGY | Age: 67
End: 2017-06-20
Attending: INTERNAL MEDICINE

## 2017-06-20 ENCOUNTER — IMAGING SERVICES (OUTPATIENT)
Dept: CV DIAGNOSTICS | Age: 67
End: 2017-06-20
Attending: INTERNAL MEDICINE

## 2017-06-20 ENCOUNTER — HOSPITAL ENCOUNTER (OUTPATIENT)
Dept: INTERVENTIONAL RADIOLOGY/VASCULAR | Age: 67
Discharge: HOME | End: 2017-06-20
Attending: PHYSICIAN ASSISTANT

## 2017-06-20 VITALS
SYSTOLIC BLOOD PRESSURE: 126 MMHG | BODY MASS INDEX: 27.02 KG/M2 | HEIGHT: 64 IN | HEART RATE: 72 BPM | DIASTOLIC BLOOD PRESSURE: 70 MMHG | OXYGEN SATURATION: 92 % | WEIGHT: 158.29 LBS | RESPIRATION RATE: 35 BRPM | TEMPERATURE: 98 F

## 2017-06-20 VITALS
OXYGEN SATURATION: 97 % | BODY MASS INDEX: 27.11 KG/M2 | SYSTOLIC BLOOD PRESSURE: 132 MMHG | WEIGHT: 158.8 LBS | HEART RATE: 92 BPM | HEIGHT: 64 IN | DIASTOLIC BLOOD PRESSURE: 74 MMHG

## 2017-06-20 VITALS
SYSTOLIC BLOOD PRESSURE: 130 MMHG | DIASTOLIC BLOOD PRESSURE: 74 MMHG | HEART RATE: 88 BPM | WEIGHT: 158.3 LBS | BODY MASS INDEX: 27.6 KG/M2 | OXYGEN SATURATION: 97 %

## 2017-06-20 DIAGNOSIS — I27.20 PULMONARY HYPERTENSION (CMD): ICD-10-CM

## 2017-06-20 DIAGNOSIS — I27.24 CTEPH (CHRONIC THROMBOEMBOLIC PULMONARY HYPERTENSION) (CMD): Primary | ICD-10-CM

## 2017-06-20 DIAGNOSIS — I27.20 PULMONARY HTN (CMD): ICD-10-CM

## 2017-06-20 DIAGNOSIS — Z86.711 HISTORY OF PULMONARY EMBOLISM: ICD-10-CM

## 2017-06-20 DIAGNOSIS — I26.09 OTHER ACUTE PULMONARY EMBOLISM WITH ACUTE COR PULMONALE (CMD): Primary | ICD-10-CM

## 2017-06-20 DIAGNOSIS — G47.19 DAYTIME HYPERSOMNOLENCE: Primary | ICD-10-CM

## 2017-06-20 DIAGNOSIS — Z86.711 HX PULMONARY EMBOLISM: ICD-10-CM

## 2017-06-20 DIAGNOSIS — I27.0 PRIMARY PULMONARY HYPERTENSION (PPH) (CMD): ICD-10-CM

## 2017-06-20 DIAGNOSIS — I26.09 OTHER ACUTE PULMONARY EMBOLISM WITH ACUTE COR PULMONALE (CMD): ICD-10-CM

## 2017-06-20 LAB
ANION GAP SERPL CALC-SCNC: 13 MMOL/L (ref 10–20)
BUN SERPL-MCNC: 11 MG/DL (ref 6–20)
BUN/CREAT SERPL: 18 (ref 7–25)
CALCIUM SERPL-MCNC: 8.6 MG/DL (ref 8.4–10.2)
CHLORIDE SERPL-SCNC: 107 MMOL/L (ref 98–107)
CO2 SERPL-SCNC: 25 MMOL/L (ref 21–32)
CREAT SERPL-MCNC: 0.61 MG/DL (ref 0.51–0.95)
GLUCOSE SERPL-MCNC: 97 MG/DL (ref 65–99)
INR PPP: 2
POTASSIUM SERPL-SCNC: 3.9 MMOL/L (ref 3.4–5.1)
PROTHROMBIN TIME: 22.1 SEC (ref 9.7–11.8)
SODIUM SERPL-SCNC: 141 MMOL/L (ref 135–145)

## 2017-06-20 PROCEDURE — 99152 MOD SED SAME PHYS/QHP 5/>YRS: CPT | Performed by: RADIOLOGY

## 2017-06-20 PROCEDURE — 10003056 HB DISPOSABLE INSTRUMENT/SUPPLY 1

## 2017-06-20 PROCEDURE — 10002805 HB CONTRAST AGENT: Performed by: RADIOLOGY

## 2017-06-20 PROCEDURE — 80048 BASIC METABOLIC PNL TOTAL CA: CPT

## 2017-06-20 PROCEDURE — 93308 TTE F-UP OR LMTD: CPT | Performed by: INTERNAL MEDICINE

## 2017-06-20 PROCEDURE — 0399T ECHO LIMITED: CPT | Performed by: INTERNAL MEDICINE

## 2017-06-20 PROCEDURE — 36415 COLL VENOUS BLD VENIPUNCTURE: CPT

## 2017-06-20 PROCEDURE — 99214 OFFICE O/P EST MOD 30 MIN: CPT | Performed by: INTERNAL MEDICINE

## 2017-06-20 PROCEDURE — 10004185 HB COUNTER-VISIT  CENSUS

## 2017-06-20 PROCEDURE — 10002800 HB RX 250 W HCPCS: Performed by: RADIOLOGY

## 2017-06-20 PROCEDURE — C1894 INTRO/SHEATH, NON-LASER: HCPCS

## 2017-06-20 PROCEDURE — 99212 OFFICE O/P EST SF 10 MIN: CPT

## 2017-06-20 PROCEDURE — 99213 OFFICE O/P EST LOW 20 MIN: CPT | Performed by: PHYSICIAN ASSISTANT

## 2017-06-20 PROCEDURE — 93325 DOPPLER ECHO COLOR FLOW MAPG: CPT | Performed by: INTERNAL MEDICINE

## 2017-06-20 PROCEDURE — 37191 INS ENDOVAS VENA CAVA FILTR: CPT

## 2017-06-20 PROCEDURE — 93321 DOPPLER ECHO F-UP/LMTD STD: CPT | Performed by: INTERNAL MEDICINE

## 2017-06-20 PROCEDURE — 93308 TTE F-UP OR LMTD: CPT

## 2017-06-20 PROCEDURE — 10004187 HB COUNTER-VISIT CENSUS, OUTPATIENT

## 2017-06-20 PROCEDURE — 76376 3D RENDER W/INTRP POSTPROCES: CPT | Performed by: INTERNAL MEDICINE

## 2017-06-20 PROCEDURE — C1769 GUIDE WIRE: HCPCS

## 2017-06-20 PROCEDURE — 10004160 HB COUNTER-PRE PROC ENCOUNTER

## 2017-06-20 PROCEDURE — 37191 INS ENDOVAS VENA CAVA FILTR: CPT | Performed by: RADIOLOGY

## 2017-06-20 PROCEDURE — 75743 ARTERY X-RAYS LUNGS: CPT | Performed by: RADIOLOGY

## 2017-06-20 PROCEDURE — 10004159 HB COUNTER-POST PROCEDURE-DISCHARGE

## 2017-06-20 PROCEDURE — C1880 VENA CAVA FILTER: HCPCS

## 2017-06-20 PROCEDURE — 85610 PROTHROMBIN TIME: CPT

## 2017-06-20 PROCEDURE — 10002807 HB RX 258: Performed by: RADIOLOGY

## 2017-06-20 PROCEDURE — C1887 CATHETER, GUIDING: HCPCS

## 2017-06-20 PROCEDURE — 36014 PLACE CATHETER IN ARTERY: CPT

## 2017-06-20 PROCEDURE — 10002801 HB RX 250 W/O HCPCS: Performed by: RADIOLOGY

## 2017-06-20 PROCEDURE — 36014 PLACE CATHETER IN ARTERY: CPT | Performed by: RADIOLOGY

## 2017-06-20 PROCEDURE — 99211 OFF/OP EST MAY X REQ PHY/QHP: CPT

## 2017-06-20 RX ORDER — MAGNESIUM HYDROXIDE 1200 MG/15ML
LIQUID ORAL PRN
Status: COMPLETED | OUTPATIENT
Start: 2017-06-20 | End: 2017-06-20

## 2017-06-20 RX ORDER — 0.9 % SODIUM CHLORIDE 0.9 %
2 VIAL (ML) INJECTION EVERY 12 HOURS SCHEDULED
Status: DISCONTINUED | OUTPATIENT
Start: 2017-06-20 | End: 2017-06-21 | Stop reason: HOSPADM

## 2017-06-20 RX ORDER — LIDOCAINE HYDROCHLORIDE 10 MG/ML
INJECTION, SOLUTION INFILTRATION; PERINEURAL PRN
Status: COMPLETED | OUTPATIENT
Start: 2017-06-20 | End: 2017-06-20

## 2017-06-20 RX ORDER — 0.9 % SODIUM CHLORIDE 0.9 %
2 VIAL (ML) INJECTION PRN
Status: DISCONTINUED | OUTPATIENT
Start: 2017-06-20 | End: 2017-06-21 | Stop reason: HOSPADM

## 2017-06-20 RX ORDER — MIDAZOLAM HYDROCHLORIDE 1 MG/ML
INJECTION, SOLUTION INTRAMUSCULAR; INTRAVENOUS PRN
Status: COMPLETED | OUTPATIENT
Start: 2017-06-20 | End: 2017-06-20

## 2017-06-20 RX ORDER — SODIUM CHLORIDE 9 MG/ML
INJECTION, SOLUTION INTRAVENOUS CONTINUOUS
Status: DISCONTINUED | OUTPATIENT
Start: 2017-06-20 | End: 2017-06-21 | Stop reason: HOSPADM

## 2017-06-20 RX ADMIN — MIDAZOLAM HYDROCHLORIDE 0.5 MG: 1 INJECTION, SOLUTION INTRAMUSCULAR; INTRAVENOUS at 10:35

## 2017-06-20 RX ADMIN — SODIUM CHLORIDE: 9 INJECTION, SOLUTION INTRAVENOUS at 08:53

## 2017-06-20 RX ADMIN — LIDOCAINE HYDROCHLORIDE 5 ML: 10 INJECTION, SOLUTION INFILTRATION; PERINEURAL at 10:46

## 2017-06-20 RX ADMIN — SODIUM CHLORIDE 1000 ML: 900 IRRIGANT IRRIGATION at 10:39

## 2017-06-20 RX ADMIN — IOPAMIDOL 100 ML: 612 INJECTION, SOLUTION INTRAVENOUS at 10:38

## 2017-06-20 ASSESSMENT — PAIN SCALES - GENERAL
PAIN_LEVEL_AT_REST: 0
PAIN_LEVEL_WITH_ACTIVITY: 0
PAIN_LEVEL_AT_REST: 0
PAIN_LEVEL_WITH_ACTIVITY: 0

## 2017-06-21 ASSESSMENT — NEW YORK HEART ASSOCIATION (NYHA) CLASSIFICATION: NYHA FUNCTIONAL CLASS: II

## 2017-06-22 ENCOUNTER — TELEPHONE (OUTPATIENT)
Dept: TRANSPLANT | Age: 67
End: 2017-06-22

## 2017-06-22 DIAGNOSIS — D69.9 ANTICOAGULANT DISORDER (CMD): Primary | ICD-10-CM

## 2017-06-26 ENCOUNTER — LAB SERVICES (OUTPATIENT)
Dept: LAB | Age: 67
End: 2017-06-26

## 2017-06-26 ENCOUNTER — TELEPHONE (OUTPATIENT)
Dept: TRANSPLANT | Age: 67
End: 2017-06-26

## 2017-06-26 DIAGNOSIS — D69.9 ANTICOAGULANT DISORDER (CMD): ICD-10-CM

## 2017-06-26 LAB
INR PPP: 1.7
PROTHROMBIN TIME: 18.7 SEC (ref 9.7–11.8)

## 2017-06-26 PROCEDURE — 36415 COLL VENOUS BLD VENIPUNCTURE: CPT | Performed by: INTERNAL MEDICINE

## 2017-06-26 PROCEDURE — 85610 PROTHROMBIN TIME: CPT | Performed by: INTERNAL MEDICINE

## 2017-06-27 DIAGNOSIS — D69.9 ANTICOAGULANT DISORDER (CMD): Primary | ICD-10-CM

## 2017-06-30 ENCOUNTER — TELEPHONE (OUTPATIENT)
Dept: TRANSPLANT | Age: 67
End: 2017-06-30

## 2017-06-30 ENCOUNTER — TELEPHONE (OUTPATIENT)
Dept: PULMONOLOGY | Age: 67
End: 2017-06-30

## 2017-06-30 ENCOUNTER — LAB SERVICES (OUTPATIENT)
Dept: LAB | Age: 67
End: 2017-06-30

## 2017-06-30 DIAGNOSIS — D69.9 ANTICOAGULANT DISORDER (CMD): ICD-10-CM

## 2017-06-30 LAB
INR PPP: 2.3
PROTHROMBIN TIME: 25.2 SEC (ref 9.7–11.8)

## 2017-06-30 PROCEDURE — 85610 PROTHROMBIN TIME: CPT | Performed by: INTERNAL MEDICINE

## 2017-06-30 PROCEDURE — 36415 COLL VENOUS BLD VENIPUNCTURE: CPT | Performed by: INTERNAL MEDICINE

## 2017-07-03 ENCOUNTER — TELEPHONE (OUTPATIENT)
Dept: TRANSPLANT | Age: 67
End: 2017-07-03

## 2017-07-05 ENCOUNTER — TELEPHONE (OUTPATIENT)
Dept: FAMILY MEDICINE | Age: 67
End: 2017-07-05

## 2017-07-05 DIAGNOSIS — I26.09 OTHER ACUTE PULMONARY EMBOLISM WITH ACUTE COR PULMONALE (CMD): ICD-10-CM

## 2017-07-06 DIAGNOSIS — D63.8 ANEMIA OF CHRONIC DISEASE: Primary | ICD-10-CM

## 2017-07-07 ENCOUNTER — LAB SERVICES (OUTPATIENT)
Dept: LAB | Age: 67
End: 2017-07-07

## 2017-07-07 ENCOUNTER — TELEPHONE (OUTPATIENT)
Dept: TRANSPLANT | Age: 67
End: 2017-07-07

## 2017-07-07 DIAGNOSIS — D69.9 ANTICOAGULANT DISORDER (CMD): ICD-10-CM

## 2017-07-07 LAB
INR PPP: 2.5
PROTHROMBIN TIME: 27.2 SEC (ref 9.7–11.8)

## 2017-07-07 PROCEDURE — 36415 COLL VENOUS BLD VENIPUNCTURE: CPT | Performed by: INTERNAL MEDICINE

## 2017-07-07 PROCEDURE — 85610 PROTHROMBIN TIME: CPT | Performed by: INTERNAL MEDICINE

## 2017-07-12 ENCOUNTER — TELEPHONE (OUTPATIENT)
Dept: TRANSPLANT | Age: 67
End: 2017-07-12

## 2017-07-12 RX ORDER — WARFARIN SODIUM 10 MG/1
10 TABLET ORAL DAILY
Qty: 90 TABLET | Refills: 0 | Status: ON HOLD | OUTPATIENT
Start: 2017-07-12 | End: 2017-07-23 | Stop reason: DRUGHIGH

## 2017-07-13 ENCOUNTER — LAB SERVICES (OUTPATIENT)
Dept: LAB | Age: 67
End: 2017-07-13

## 2017-07-13 ENCOUNTER — TELEPHONE (OUTPATIENT)
Dept: HEMATOLOGY/ONCOLOGY | Age: 67
End: 2017-07-13

## 2017-07-13 DIAGNOSIS — D50.9 IRON DEFICIENCY ANEMIA, UNSPECIFIED IRON DEFICIENCY ANEMIA TYPE: ICD-10-CM

## 2017-07-13 DIAGNOSIS — D63.8 ANEMIA OF CHRONIC DISEASE: ICD-10-CM

## 2017-07-13 DIAGNOSIS — I27.20 PULMONARY HYPERTENSION (CMD): ICD-10-CM

## 2017-07-13 LAB
ALBUMIN SERPL-MCNC: 3.4 G/DL (ref 3.6–5.1)
ALBUMIN/GLOB SERPL: 1 {RATIO} (ref 1–2.4)
ALP SERPL-CCNC: 84 UNITS/L (ref 45–117)
ALT SERPL-CCNC: 27 UNITS/L
ANION GAP SERPL CALC-SCNC: 13 MMOL/L (ref 10–20)
AST SERPL-CCNC: 23 UNITS/L
BASOPHILS # BLD AUTO: 0 K/MCL (ref 0–0.3)
BASOPHILS NFR BLD AUTO: 1 %
BILIRUB SERPL-MCNC: 0.7 MG/DL (ref 0.2–1)
BUN SERPL-MCNC: 10 MG/DL (ref 6–20)
BUN/CREAT SERPL: 14 (ref 7–25)
CALCIUM SERPL-MCNC: 8.6 MG/DL (ref 8.4–10.2)
CHLORIDE SERPL-SCNC: 106 MMOL/L (ref 98–107)
CO2 SERPL-SCNC: 28 MMOL/L (ref 21–32)
CREAT SERPL-MCNC: 0.73 MG/DL (ref 0.51–0.95)
DIFFERENTIAL METHOD BLD: ABNORMAL
EOSINOPHIL # BLD AUTO: 0.4 K/MCL (ref 0.1–0.5)
EOSINOPHIL NFR SPEC: 9 %
ERYTHROCYTE [DISTWIDTH] IN BLOOD: 20.4 % (ref 11–15)
FASTING STATUS PATIENT QL REPORTED: 12 HRS
FERRITIN SERPL-MCNC: 22 NG/ML (ref 8–252)
GLOBULIN SER-MCNC: 3.4 G/DL (ref 2–4)
GLUCOSE SERPL-MCNC: 85 MG/DL (ref 65–99)
HCT VFR BLD CALC: 36.7 % (ref 36–46.5)
HGB BLD-MCNC: 11.4 G/DL (ref 12–15.5)
IMM RETICS NFR: 12.1 % (ref 1.5–16)
LDH SERPL L TO P-CCNC: 232 UNITS/L (ref 82–240)
LYMPHOCYTES # BLD MANUAL: 1.2 K/MCL (ref 1–4)
LYMPHOCYTES NFR BLD MANUAL: 28 %
MCH RBC QN AUTO: 25.9 PG (ref 26–34)
MCHC RBC AUTO-ENTMCNC: 31.1 G/DL (ref 32–36.5)
MCV RBC AUTO: 83.2 FL (ref 78–100)
MONOCYTES # BLD MANUAL: 0.4 K/MCL (ref 0.3–0.9)
MONOCYTES NFR BLD MANUAL: 8 %
NEUTROPHILS # BLD AUTO: 2.4 K/MCL (ref 1.8–7.7)
NEUTROPHILS NFR BLD AUTO: 54 %
PLATELET # BLD: 344 K/MCL (ref 140–450)
POTASSIUM SERPL-SCNC: 4.1 MMOL/L (ref 3.4–5.1)
PROT SERPL-MCNC: 6.8 G/DL (ref 6.4–8.2)
RBC # BLD: 4.41 MIL/MCL (ref 4–5.2)
RETICS #: 40 K/MCL (ref 10–120)
RETICS/RBC NFR AUTO: 0.9 % (ref 0.3–2.5)
SODIUM SERPL-SCNC: 143 MMOL/L (ref 135–145)
WBC # BLD: 4.4 K/MCL (ref 4.2–11)

## 2017-07-13 PROCEDURE — 83615 LACTATE (LD) (LDH) ENZYME: CPT | Performed by: INTERNAL MEDICINE

## 2017-07-13 PROCEDURE — 80053 COMPREHEN METABOLIC PANEL: CPT | Performed by: INTERNAL MEDICINE

## 2017-07-13 PROCEDURE — 36415 COLL VENOUS BLD VENIPUNCTURE: CPT | Performed by: INTERNAL MEDICINE

## 2017-07-13 PROCEDURE — 85025 COMPLETE CBC W/AUTO DIFF WBC: CPT | Performed by: INTERNAL MEDICINE

## 2017-07-14 ENCOUNTER — OFFICE VISIT (OUTPATIENT)
Dept: HEMATOLOGY/ONCOLOGY | Age: 67
End: 2017-07-14
Attending: INTERNAL MEDICINE

## 2017-07-14 DIAGNOSIS — D64.9 ANEMIA, UNSPECIFIED TYPE: Primary | ICD-10-CM

## 2017-07-14 PROCEDURE — 99213 OFFICE O/P EST LOW 20 MIN: CPT | Performed by: INTERNAL MEDICINE

## 2017-07-14 ASSESSMENT — PAIN SCALES - GENERAL: PAINLEVEL: 0

## 2017-07-17 ENCOUNTER — LAB SERVICES (OUTPATIENT)
Dept: LAB | Age: 67
End: 2017-07-17

## 2017-07-17 ENCOUNTER — TELEPHONE (OUTPATIENT)
Dept: TRANSPLANT | Age: 67
End: 2017-07-17

## 2017-07-17 DIAGNOSIS — D69.9 ANTICOAGULANT DISORDER (CMD): ICD-10-CM

## 2017-07-17 LAB
INR PPP: 2.4
PROTHROMBIN TIME: 26.1 SEC (ref 9.7–11.8)

## 2017-07-17 PROCEDURE — 36415 COLL VENOUS BLD VENIPUNCTURE: CPT | Performed by: INTERNAL MEDICINE

## 2017-07-17 PROCEDURE — 85610 PROTHROMBIN TIME: CPT | Performed by: INTERNAL MEDICINE

## 2017-07-18 ENCOUNTER — TELEPHONE (OUTPATIENT)
Dept: TRANSPLANT | Age: 67
End: 2017-07-18

## 2017-07-18 ENCOUNTER — HOSPITAL ENCOUNTER (OUTPATIENT)
Dept: SLEEP MEDICINE | Age: 67
Discharge: STILL ADMITTED OR ADMINISTRATIVE | End: 2017-07-18
Attending: INTERNAL MEDICINE

## 2017-07-18 VITALS — WEIGHT: 158 LBS | HEIGHT: 63 IN | BODY MASS INDEX: 28 KG/M2

## 2017-07-18 DIAGNOSIS — Z86.711 HISTORY OF PULMONARY EMBOLISM: ICD-10-CM

## 2017-07-18 DIAGNOSIS — I27.0 PRIMARY PULMONARY HYPERTENSION (PPH) (CMD): ICD-10-CM

## 2017-07-18 DIAGNOSIS — G47.19 DAYTIME HYPERSOMNOLENCE: ICD-10-CM

## 2017-07-18 PROCEDURE — 95810 POLYSOM 6/> YRS 4/> PARAM: CPT

## 2017-07-18 PROCEDURE — 95810 POLYSOM 6/> YRS 4/> PARAM: CPT | Performed by: INTERNAL MEDICINE

## 2017-07-18 ASSESSMENT — SLEEP AND FATIGUE QUESTIONNAIRES
DID YOU TAKE A NAP TODAY: NO
HOW LIKELY ARE YOU TO NOD OFF OR FALL ASLEEP WHILE WATCHING TV: MODERATE CHANCE OF DOZING
WHAT TIME DID YOU EXERCISE: 27000
DID YOU HAVE ANY PHYSICAL EXERCISE TODAY: YES
HAVE YOU RECENTLY TAKEN ANY MEDICATIONS THAT MAKE YOU FEEL SLEEPY: YES
HOW LIKELY ARE YOU TO NOD OFF OR FALL ASLEEP WHILE SITTING AND TALKING TO SOMEONE: WOULD NEVER DOZE
HAS TODAY BEEN AN UNUSUAL DAY IN ANY RESPECT: NO
WHEN DID YOU FEEL SLEEPY: 11
WHAT TIME DID YOU WAKE UP TODAY: 23400
HOW LIKELY ARE YOU TO NOD OFF OR FALL ASLEEP WHILE LYING DOWN TO REST IN THE AFTERNOON WHEN CIRCUMSTANCES PERMIT: WOULD NEVER DOZE
HOW LIKELY ARE YOU TO NOD OFF OR FALL ASLEEP IN A CAR, WHILE STOPPED FOR A FEW MINUTES IN TRAFFIC: WOULD NEVER DOZE
HOW LIKELY ARE YOU TO NOD OFF OR FALL ASLEEP WHEN YOU ARE A PASSENGER IN A CAR FOR AN HOUR WITHOUT A BREAK: HIGH CHANCE OF DOZING
HOW MUCH SLEEP DID YOU HAVE LAST NIGHT (HRS/MIN): 7 HR
DID YOU DRINK ANY ALCOHOL TODAY: NO
DID YOU HAVE ANY CAFFEINE TODAY: NO
HOW LIKELY ARE YOU TO NOD OFF OR FALL ASLEEP WHILE SITTING QUIETLY AFTER LUNCH WITHOUT ALCOHOL: MODERATE CHANCE OF DOZING
DESCRIBE HOW YOU FEEL RIGHT NOW: RELAXED, NOT AT FULL ALERTNESS, RESPONSIVE
DID YOU FEEL SLEEPY TODAY: YES
HOW LIKELY ARE YOU TO NOD OFF OR FALL ASLEEP WHILE SITTING AND READING: HIGH CHANCE OF DOZING
HOW MUCH EXERCISE DID YOU DO: 35 MIN
ESS TOTAL SCORE: 10
HOW LIKELY ARE YOU TO NOD OFF OR FALL ASLEEP WHILE SITTING INACTIVE IN A PUBLIC PLACE: WOULD NEVER DOZE
DO YOU HAVE ANY PHYSICAL COMPLAINTS RIGHT NOW: NO

## 2017-07-18 ASSESSMENT — LIFESTYLE VARIABLES: HOW OFTEN DO YOU HAVE A DRINK CONTAINING ALCOHOL: NEVER

## 2017-07-19 ENCOUNTER — APPOINTMENT (OUTPATIENT)
Dept: PULMONOLOGY | Age: 67
End: 2017-07-19
Attending: INTERNAL MEDICINE

## 2017-07-19 ASSESSMENT — SLEEP AND FATIGUE QUESTIONNAIRES
HOW LONG DO YOU THINK YOU WERE ASLEEP (HRS/MIN): 6 HR
DID YOU TAKE ANY MEDICATIONS LAST NIGHT THAT MAKE YOU FEEL SLEEPY: YES
IS YOUR NOSE OR MOUTH DRY: YES
DID YOU WAKE UP DURING THE NIGHT: YES
NUMBER OF TIMES YOU WOKE UP IN THE NIGHT: 1
DID YOU DREAM LAST NIGHT: YES
DO YOU HAVE A SOUR/BITTER TASTE IN YOUR MOUTH: NO
HOW LONG DID IT TAKE TO FALL ASLEEP LAST NIGHT (HRS/MIN): .5
HOW DEEPLY DID YOU SLEEP LAST NIGHT: AVERAGE
DESCRIBE HOW YOU FEEL RIGHT NOW: FEELING ACTIVE AND VITAL, ALERT, WIDE-AWAKE
DID YOU HAVE DIFFICULTY FALLING BACK TO SLEEP: NO
HOW SLEEPY DO YOU FEEL RIGHT NOW: NOT AT ALL
HOW DOES THIS COMPARE TO YOUR USUAL AMOUNT OF SLEEP AT HOME: SHORTER THAN
DO YOU FEEL AWAKE AND ALERT ENOUGH TO DRIVE HOME: YES
HOW DOES YOUR SLEEP LAST NIGHT COMPARE TO YOUR USUAL SLEEP AT HOME: SAME AS
DID YOU HAVE DIFFICULTY FALLING ASLEEP LAST NIGHT: YES

## 2017-07-21 ENCOUNTER — TELEPHONE (OUTPATIENT)
Dept: TRANSPLANT | Age: 67
End: 2017-07-21

## 2017-07-23 ENCOUNTER — HOSPITAL ENCOUNTER (EMERGENCY)
Age: 67
Discharge: HOME | End: 2017-07-23
Attending: EMERGENCY MEDICINE

## 2017-07-23 ENCOUNTER — APPOINTMENT (OUTPATIENT)
Dept: GENERAL RADIOLOGY | Age: 67
End: 2017-07-23
Attending: PHYSICIAN ASSISTANT

## 2017-07-23 ENCOUNTER — TELEPHONE (OUTPATIENT)
Dept: TRANSPLANT | Age: 67
End: 2017-07-23

## 2017-07-23 ENCOUNTER — HOSPITAL ENCOUNTER (INPATIENT)
Age: 67
LOS: 9 days | Discharge: HOME HEALTH | DRG: 314 | End: 2017-08-01
Attending: EMERGENCY MEDICINE | Admitting: INTERNAL MEDICINE

## 2017-07-23 VITALS
DIASTOLIC BLOOD PRESSURE: 60 MMHG | SYSTOLIC BLOOD PRESSURE: 105 MMHG | TEMPERATURE: 100.8 F | HEART RATE: 98 BPM | HEIGHT: 63 IN | OXYGEN SATURATION: 95 % | RESPIRATION RATE: 16 BRPM | BODY MASS INDEX: 26.58 KG/M2 | WEIGHT: 150 LBS

## 2017-07-23 DIAGNOSIS — R50.9 FEVER, UNSPECIFIED FEVER CAUSE: ICD-10-CM

## 2017-07-23 DIAGNOSIS — J96.11 CHRONIC HYPOXEMIC RESPIRATORY FAILURE (CMD): ICD-10-CM

## 2017-07-23 DIAGNOSIS — R13.10 DYSPHAGIA, UNSPECIFIED TYPE: ICD-10-CM

## 2017-07-23 DIAGNOSIS — R78.81 BACTEREMIA: Primary | ICD-10-CM

## 2017-07-23 DIAGNOSIS — R50.9 FEVER OF UNKNOWN ORIGIN: Primary | ICD-10-CM

## 2017-07-23 DIAGNOSIS — G47.10 HYPERSOMNOLENCE: ICD-10-CM

## 2017-07-23 DIAGNOSIS — I27.24 CTEPH (CHRONIC THROMBOEMBOLIC PULMONARY HYPERTENSION) (CMD): ICD-10-CM

## 2017-07-23 DIAGNOSIS — R11.2 NAUSEA AND VOMITING IN ADULT: ICD-10-CM

## 2017-07-23 DIAGNOSIS — G47.34 NOCTURNAL HYPOXEMIA: ICD-10-CM

## 2017-07-23 DIAGNOSIS — I27.20 PULMONARY HYPERTENSION (CMD): ICD-10-CM

## 2017-07-23 LAB
ALBUMIN SERPL-MCNC: 3.8 G/DL (ref 3.6–5.1)
ALBUMIN/GLOB SERPL: 0.9 {RATIO} (ref 1–2.4)
ALP SERPL-CCNC: 97 UNITS/L (ref 45–117)
ALT SERPL-CCNC: 28 UNITS/L
ANION GAP BLD CALC-SCNC: 20 MMOL/L
ANION GAP SERPL CALC-SCNC: 15 MMOL/L (ref 10–20)
APPEARANCE UR: CLEAR
AST SERPL-CCNC: 28 UNITS/L
ATRIAL RATE (BPM): 105
BASOPHILS # BLD AUTO: 0 K/MCL (ref 0–0.3)
BASOPHILS NFR BLD AUTO: 0 %
BILIRUB SERPL-MCNC: 1.3 MG/DL (ref 0.2–1)
BILIRUB UR QL STRIP: NEGATIVE
BUN BLD-MCNC: 8 MG/DL (ref 6–20)
BUN SERPL-MCNC: 10 MG/DL (ref 6–20)
BUN/CREAT SERPL: 14 (ref 7–25)
CA-I BLD ISE-SCNC: 1.15 MMOL/L (ref 1.15–1.29)
CALCIUM SERPL-MCNC: 9 MG/DL (ref 8.4–10.2)
CHLORIDE BLD-SCNC: 99 MMOL/L (ref 98–107)
CHLORIDE SERPL-SCNC: 97 MMOL/L (ref 98–107)
CO2 BLD-SCNC: 22 MMOL/L (ref 19–24)
CO2 SERPL-SCNC: 23 MMOL/L (ref 21–32)
COLOR UR: YELLOW
CREAT BLD-MCNC: 0.7 MG/DL (ref 0.51–0.95)
CREAT SERPL-MCNC: 0.69 MG/DL (ref 0.51–0.95)
CROSSMATCH EXPIRE: NORMAL
DIFFERENTIAL METHOD BLD: ABNORMAL
EOSINOPHIL # BLD AUTO: 0 K/MCL (ref 0.1–0.5)
EOSINOPHIL NFR SPEC: 0 %
ERYTHROCYTE [DISTWIDTH] IN BLOOD: 19.4 % (ref 11–15)
GLOBULIN SER-MCNC: 4.3 G/DL (ref 2–4)
GLUCOSE BLD-MCNC: 134 MG/DL (ref 65–99)
GLUCOSE SERPL-MCNC: 136 MG/DL (ref 65–99)
GLUCOSE UR STRIP-MCNC: NEGATIVE MG/DL
HCT VFR BLD CALC: 38.2 % (ref 36–46.5)
HCT VFR BLD CALC: 40 % (ref 36–46.5)
HGB BLD-MCNC: 12.3 G/DL (ref 12–15.5)
HGB BLD-MCNC: 13.6 G/DL (ref 12–15.5)
HGB UR QL STRIP: NEGATIVE
INR PPP: 2.7
KETONES UR STRIP-MCNC: 80 MG/DL
LACTATE BLD-MCNC: 0.7 MMOL/L
LACTATE BLD-MCNC: 0.9 MMOL/L
LEUKOCYTE ESTERASE UR QL STRIP: NEGATIVE
LIPASE SERPL-CCNC: 86 UNITS/L (ref 73–393)
LYMPHOCYTES # BLD MANUAL: 0.5 K/MCL (ref 1–4)
LYMPHOCYTES NFR BLD MANUAL: 7 %
MCH RBC QN AUTO: 26.2 PG (ref 26–34)
MCHC RBC AUTO-ENTMCNC: 32.2 G/DL (ref 32–36.5)
MCV RBC AUTO: 81.4 FL (ref 78–100)
MONOCYTES # BLD MANUAL: 0.3 K/MCL (ref 0.3–0.9)
MONOCYTES NFR BLD MANUAL: 3 %
NEUTROPHILS # BLD AUTO: 6.9 K/MCL (ref 1.8–7.7)
NEUTROPHILS NFR BLD AUTO: 90 %
NITRITE UR QL STRIP: NEGATIVE
P AXIS (DEGREES): 51
PH UR STRIP: 5.5 UNITS (ref 5–7)
PLATELET # BLD: 286 K/MCL (ref 140–450)
POTASSIUM BLD-SCNC: 3.5 MMOL/L (ref 3.4–5.1)
POTASSIUM SERPL-SCNC: 3.4 MMOL/L (ref 3.4–5.1)
PR-INTERVAL (MSEC): 148
PROT SERPL-MCNC: 8.1 G/DL (ref 6.4–8.2)
PROT UR STRIP-MCNC: ABNORMAL MG/DL
PROTHROMBIN TIME: 29 SEC (ref 9.7–11.8)
QRS-INTERVAL (MSEC): 70
QT-INTERVAL (MSEC): 328
QTC: 433
R AXIS (DEGREES): 62
RBC # BLD: 4.69 MIL/MCL (ref 4–5.2)
REPORT TEXT: NORMAL
SODIUM BLD-SCNC: 137 MMOL/L (ref 135–145)
SODIUM SERPL-SCNC: 132 MMOL/L (ref 135–145)
SP GR UR STRIP: 1.02 (ref 1–1.03)
SPECIMEN SOURCE: ABNORMAL
T AXIS (DEGREES): 66
TROPONIN I BLD-MCNC: <0.1 NG/ML
TROPONIN I BLD-MCNC: <0.1 NG/ML
UROBILINOGEN UR STRIP-MCNC: 0.2 MG/DL (ref 0–1)
VENTRICULAR RATE EKG/MIN (BPM): 105
WBC # BLD: 7.7 K/MCL (ref 4.2–11)

## 2017-07-23 PROCEDURE — 84484 ASSAY OF TROPONIN QUANT: CPT

## 2017-07-23 PROCEDURE — 83605 ASSAY OF LACTIC ACID: CPT

## 2017-07-23 PROCEDURE — 99223 1ST HOSP IP/OBS HIGH 75: CPT | Performed by: INTERNAL MEDICINE

## 2017-07-23 PROCEDURE — 93005 ELECTROCARDIOGRAM TRACING: CPT | Performed by: EMERGENCY MEDICINE

## 2017-07-23 PROCEDURE — 83690 ASSAY OF LIPASE: CPT

## 2017-07-23 PROCEDURE — 80053 COMPREHEN METABOLIC PANEL: CPT

## 2017-07-23 PROCEDURE — 36415 COLL VENOUS BLD VENIPUNCTURE: CPT

## 2017-07-23 PROCEDURE — 71010 XR CHEST AP OR PA: CPT | Performed by: RADIOLOGY

## 2017-07-23 PROCEDURE — 10002800 HB RX 250 W HCPCS: Performed by: EMERGENCY MEDICINE

## 2017-07-23 PROCEDURE — 93005 ELECTROCARDIOGRAM TRACING: CPT | Performed by: PHYSICIAN ASSISTANT

## 2017-07-23 PROCEDURE — 80047 BASIC METABLC PNL IONIZED CA: CPT

## 2017-07-23 PROCEDURE — 10002807 HB RX 258: Performed by: EMERGENCY MEDICINE

## 2017-07-23 PROCEDURE — 96361 HYDRATE IV INFUSION ADD-ON: CPT

## 2017-07-23 PROCEDURE — 99284 EMERGENCY DEPT VISIT MOD MDM: CPT

## 2017-07-23 PROCEDURE — 85025 COMPLETE CBC W/AUTO DIFF WBC: CPT

## 2017-07-23 PROCEDURE — 85610 PROTHROMBIN TIME: CPT

## 2017-07-23 PROCEDURE — 10000002 HB ROOM CHARGE MED SURG

## 2017-07-23 PROCEDURE — 87040 BLOOD CULTURE FOR BACTERIA: CPT

## 2017-07-23 PROCEDURE — 96374 THER/PROPH/DIAG INJ IV PUSH: CPT

## 2017-07-23 PROCEDURE — 81003 URINALYSIS AUTO W/O SCOPE: CPT

## 2017-07-23 PROCEDURE — 84145 PROCALCITONIN (PCT): CPT

## 2017-07-23 PROCEDURE — 87186 SC STD MICRODIL/AGAR DIL: CPT

## 2017-07-23 PROCEDURE — 87149 DNA/RNA DIRECT PROBE: CPT

## 2017-07-23 PROCEDURE — 10004651 HB RX, NO CHARGE ITEM: Performed by: EMERGENCY MEDICINE

## 2017-07-23 PROCEDURE — 87077 CULTURE AEROBIC IDENTIFY: CPT

## 2017-07-23 PROCEDURE — 71010 XR CHEST AP OR PA: CPT

## 2017-07-23 RX ORDER — POTASSIUM CHLORIDE 14.9 MG/ML
20 INJECTION INTRAVENOUS EVERY 4 HOURS PRN
Status: DISCONTINUED | OUTPATIENT
Start: 2017-07-23 | End: 2017-08-01 | Stop reason: HOSPADM

## 2017-07-23 RX ORDER — BISACODYL 10 MG
10 SUPPOSITORY, RECTAL RECTAL DAILY PRN
Status: DISCONTINUED | OUTPATIENT
Start: 2017-07-23 | End: 2017-08-01 | Stop reason: HOSPADM

## 2017-07-23 RX ORDER — 0.9 % SODIUM CHLORIDE 0.9 %
2 VIAL (ML) INJECTION PRN
Status: DISCONTINUED | OUTPATIENT
Start: 2017-07-23 | End: 2017-08-01 | Stop reason: HOSPADM

## 2017-07-23 RX ORDER — POTASSIUM CHLORIDE 1.5 G/1.58G
20 POWDER, FOR SOLUTION ORAL EVERY 4 HOURS PRN
Status: DISCONTINUED | OUTPATIENT
Start: 2017-07-23 | End: 2017-08-01 | Stop reason: HOSPADM

## 2017-07-23 RX ORDER — NITROGLYCERIN 0.4 MG/1
0.4 TABLET SUBLINGUAL EVERY 5 MIN PRN
Status: DISCONTINUED | OUTPATIENT
Start: 2017-07-23 | End: 2017-07-27 | Stop reason: ALTCHOICE

## 2017-07-23 RX ORDER — ACETAMINOPHEN 500 MG
1000 TABLET ORAL ONCE
Status: COMPLETED | OUTPATIENT
Start: 2017-07-23 | End: 2017-07-23

## 2017-07-23 RX ORDER — 0.9 % SODIUM CHLORIDE 0.9 %
2 VIAL (ML) INJECTION EVERY 12 HOURS SCHEDULED
Status: DISCONTINUED | OUTPATIENT
Start: 2017-07-24 | End: 2017-08-01 | Stop reason: HOSPADM

## 2017-07-23 RX ORDER — ACETAMINOPHEN 325 MG/1
650 TABLET ORAL EVERY 4 HOURS PRN
Status: DISCONTINUED | OUTPATIENT
Start: 2017-07-23 | End: 2017-08-01 | Stop reason: HOSPADM

## 2017-07-23 RX ORDER — HYDRALAZINE HYDROCHLORIDE 20 MG/ML
10 INJECTION INTRAMUSCULAR; INTRAVENOUS EVERY 6 HOURS PRN
Status: DISCONTINUED | OUTPATIENT
Start: 2017-07-23 | End: 2017-08-01 | Stop reason: HOSPADM

## 2017-07-23 RX ORDER — POTASSIUM CHLORIDE 1.5 G/1.58G
40 POWDER, FOR SOLUTION ORAL EVERY 4 HOURS PRN
Status: DISCONTINUED | OUTPATIENT
Start: 2017-07-23 | End: 2017-08-01 | Stop reason: HOSPADM

## 2017-07-23 RX ORDER — ONDANSETRON 8 MG/1
8 TABLET, ORALLY DISINTEGRATING ORAL EVERY 8 HOURS PRN
Qty: 5 TABLET | Refills: 0 | Status: SHIPPED | OUTPATIENT
Start: 2017-07-23 | End: 2017-11-20 | Stop reason: ALTCHOICE

## 2017-07-23 RX ORDER — POTASSIUM CHLORIDE 14.9 MG/ML
40 INJECTION INTRAVENOUS EVERY 4 HOURS PRN
Status: DISCONTINUED | OUTPATIENT
Start: 2017-07-23 | End: 2017-08-01 | Stop reason: HOSPADM

## 2017-07-23 RX ORDER — POTASSIUM CHLORIDE 20 MEQ/1
20 TABLET, EXTENDED RELEASE ORAL EVERY 4 HOURS PRN
Status: DISCONTINUED | OUTPATIENT
Start: 2017-07-23 | End: 2017-08-01 | Stop reason: HOSPADM

## 2017-07-23 RX ORDER — ONDANSETRON 2 MG/ML
4 INJECTION INTRAMUSCULAR; INTRAVENOUS ONCE
Status: COMPLETED | OUTPATIENT
Start: 2017-07-23 | End: 2017-07-23

## 2017-07-23 RX ORDER — WARFARIN SODIUM 10 MG/1
10-15 TABLET ORAL SEE ADMIN INSTRUCTIONS
Status: ON HOLD | COMMUNITY
End: 2017-08-01

## 2017-07-23 RX ORDER — AMOXICILLIN 250 MG
2 CAPSULE ORAL DAILY PRN
Status: DISCONTINUED | OUTPATIENT
Start: 2017-07-23 | End: 2017-08-01 | Stop reason: HOSPADM

## 2017-07-23 RX ORDER — ONDANSETRON 2 MG/ML
4 INJECTION INTRAMUSCULAR; INTRAVENOUS EVERY 12 HOURS PRN
Status: DISCONTINUED | OUTPATIENT
Start: 2017-07-23 | End: 2017-08-01 | Stop reason: HOSPADM

## 2017-07-23 RX ORDER — MAGNESIUM SULFATE 1 G/100ML
1 INJECTION INTRAVENOUS DAILY PRN
Status: DISCONTINUED | OUTPATIENT
Start: 2017-07-23 | End: 2017-08-01 | Stop reason: HOSPADM

## 2017-07-23 RX ORDER — POTASSIUM CHLORIDE 20 MEQ/1
40 TABLET, EXTENDED RELEASE ORAL EVERY 4 HOURS PRN
Status: DISCONTINUED | OUTPATIENT
Start: 2017-07-23 | End: 2017-08-01 | Stop reason: HOSPADM

## 2017-07-23 RX ADMIN — ACETAMINOPHEN 1000 MG: 500 TABLET ORAL at 15:01

## 2017-07-23 RX ADMIN — SODIUM CHLORIDE 1000 ML: 9 INJECTION, SOLUTION INTRAVENOUS at 22:57

## 2017-07-23 RX ADMIN — ONDANSETRON 4 MG: 2 SOLUTION INTRAMUSCULAR; INTRAVENOUS at 15:01

## 2017-07-23 RX ADMIN — VANCOMYCIN HYDROCHLORIDE 1000 MG: 1 INJECTION, POWDER, LYOPHILIZED, FOR SOLUTION INTRAVENOUS at 23:40

## 2017-07-23 RX ADMIN — SODIUM CHLORIDE 1000 ML: 9 INJECTION, SOLUTION INTRAVENOUS at 14:05

## 2017-07-23 ASSESSMENT — ENCOUNTER SYMPTOMS
ABDOMINAL PAIN: 0
CONSTIPATION: 0
SHORTNESS OF BREATH: 0
BRUISES/BLEEDS EASILY: 0
SORE THROAT: 0
CONFUSION: 0
CHILLS: 0
NAUSEA: 1
CONFUSION: 0
NERVOUS/ANXIOUS: 0
DIZZINESS: 0
SHORTNESS OF BREATH: 0
DIZZINESS: 0
VOMITING: 1
DIAPHORESIS: 0
APPETITE CHANGE: 0
COUGH: 0
HEADACHES: 0
DIARRHEA: 0
DIARRHEA: 0
CHILLS: 0
RHINORRHEA: 0
FEVER: 1
NAUSEA: 1
FEVER: 1
ABDOMINAL PAIN: 0
VOMITING: 1
CHEST TIGHTNESS: 0
WEAKNESS: 0

## 2017-07-23 ASSESSMENT — PAIN SCALES - GENERAL
PAINLEVEL_OUTOF10: 8
PAINLEVEL_OUTOF10: 0
PAINLEVEL_OUTOF10: 6
PAINLEVEL_OUTOF10: 8
PAINLEVEL_OUTOF10: 0

## 2017-07-23 ASSESSMENT — MOVEMENT AND STRENGTH ASSESSMENTS: FACE_JAW: NO FACIAL DROOP

## 2017-07-24 ENCOUNTER — APPOINTMENT (OUTPATIENT)
Dept: ULTRASOUND IMAGING | Age: 67
DRG: 314 | End: 2017-07-24
Attending: INTERNAL MEDICINE

## 2017-07-24 ENCOUNTER — TELEPHONE (OUTPATIENT)
Dept: PULMONOLOGY | Age: 67
End: 2017-07-24

## 2017-07-24 ENCOUNTER — APPOINTMENT (OUTPATIENT)
Dept: INTERVENTIONAL RADIOLOGY/VASCULAR | Age: 67
DRG: 314 | End: 2017-07-24
Attending: INTERNAL MEDICINE

## 2017-07-24 ENCOUNTER — APPOINTMENT (OUTPATIENT)
Dept: OTHER | Age: 67
DRG: 314 | End: 2017-07-24
Attending: INTERNAL MEDICINE

## 2017-07-24 LAB
ALBUMIN SERPL-MCNC: 2.8 G/DL (ref 3.6–5.1)
ALBUMIN/GLOB SERPL: 0.7 {RATIO} (ref 1–2.4)
ALP SERPL-CCNC: 73 UNITS/L (ref 45–117)
ALT SERPL-CCNC: 30 UNITS/L
ANION GAP SERPL CALC-SCNC: 15 MMOL/L (ref 10–20)
AST SERPL-CCNC: 33 UNITS/L
ATRIAL RATE (BPM): 101
BASOPHILS # BLD AUTO: 0 K/MCL (ref 0–0.3)
BASOPHILS NFR BLD AUTO: 0 %
BILIRUB SERPL-MCNC: 1.2 MG/DL (ref 0.2–1)
BUN SERPL-MCNC: 10 MG/DL (ref 6–20)
BUN/CREAT SERPL: 14 (ref 7–25)
CALCIUM SERPL-MCNC: 8 MG/DL (ref 8.4–10.2)
CHLORIDE SERPL-SCNC: 103 MMOL/L (ref 98–107)
CO2 SERPL-SCNC: 22 MMOL/L (ref 21–32)
CREAT SERPL-MCNC: 0.72 MG/DL (ref 0.51–0.95)
CRP SERPL-MCNC: 9.4 MG/DL
DIFFERENTIAL METHOD BLD: ABNORMAL
EOSINOPHIL # BLD AUTO: 0 K/MCL (ref 0.1–0.5)
EOSINOPHIL NFR SPEC: 0 %
ERYTHROCYTE [DISTWIDTH] IN BLOOD: 19.7 % (ref 11–15)
ERYTHROCYTE [SEDIMENTATION RATE] IN BLOOD: 15 MM/HR (ref 0–20)
GLOBULIN SER-MCNC: 3.8 G/DL (ref 2–4)
GLUCOSE SERPL-MCNC: 123 MG/DL (ref 65–99)
HCT VFR BLD CALC: 33.6 % (ref 36–46.5)
HGB BLD-MCNC: 10.7 G/DL (ref 12–15.5)
INR PPP: 3.3
LACTATE SERPL-SCNC: 1.4 MMOL/L (ref 0–2)
LYMPHOCYTES # BLD MANUAL: 0.6 K/MCL (ref 1–4)
LYMPHOCYTES NFR BLD MANUAL: 7 %
MAGNESIUM SERPL-MCNC: 1.6 MG/DL (ref 1.7–2.4)
MCH RBC QN AUTO: 26.1 PG (ref 26–34)
MCHC RBC AUTO-ENTMCNC: 31.8 G/DL (ref 32–36.5)
MCV RBC AUTO: 82 FL (ref 78–100)
MONOCYTES # BLD MANUAL: 0.3 K/MCL (ref 0.3–0.9)
MONOCYTES NFR BLD MANUAL: 3 %
MRSA DNA SPEC QL NAA+PROBE: NOT DETECTED
NEUTROPHILS # BLD AUTO: 8.1 K/MCL (ref 1.8–7.7)
NEUTROPHILS NFR BLD AUTO: 90 %
P AXIS (DEGREES): 60
PLATELET # BLD: 217 K/MCL (ref 140–450)
POTASSIUM SERPL-SCNC: 3.8 MMOL/L (ref 3.4–5.1)
PR-INTERVAL (MSEC): 148
PROCALCITONIN SERPL-MCNC: 0.07 NG/ML
PROCALCITONIN SERPL-MCNC: 0.47 NG/ML
PROT SERPL-MCNC: 6.6 G/DL (ref 6.4–8.2)
PROTHROMBIN TIME: 35.8 SEC (ref 9.7–11.8)
QRS-INTERVAL (MSEC): 74
QT-INTERVAL (MSEC): 348
QTC: 451
R AXIS (DEGREES): 75
RBC # BLD: 4.1 MIL/MCL (ref 4–5.2)
REPORT TEXT: NORMAL
SODIUM SERPL-SCNC: 136 MMOL/L (ref 135–145)
SPECIMEN SOURCE: NORMAL
T AXIS (DEGREES): 65
VENTRICULAR RATE EKG/MIN (BPM): 101
WBC # BLD: 9 K/MCL (ref 4.2–11)

## 2017-07-24 PROCEDURE — 80053 COMPREHEN METABOLIC PANEL: CPT

## 2017-07-24 PROCEDURE — 83605 ASSAY OF LACTIC ACID: CPT

## 2017-07-24 PROCEDURE — 97161 PT EVAL LOW COMPLEX 20 MIN: CPT

## 2017-07-24 PROCEDURE — 85652 RBC SED RATE AUTOMATED: CPT

## 2017-07-24 PROCEDURE — 10004651 HB RX, NO CHARGE ITEM: Performed by: INTERNAL MEDICINE

## 2017-07-24 PROCEDURE — 10003445 HB TELEMETRY PER DAY

## 2017-07-24 PROCEDURE — 10004281 IR CENTRAL LINE

## 2017-07-24 PROCEDURE — 76705 ECHO EXAM OF ABDOMEN: CPT

## 2017-07-24 PROCEDURE — 10000002 HB ROOM CHARGE MED SURG

## 2017-07-24 PROCEDURE — 36415 COLL VENOUS BLD VENIPUNCTURE: CPT

## 2017-07-24 PROCEDURE — 10004173 HB COUNTER-THERAPY VISIT PT

## 2017-07-24 PROCEDURE — 36589 REMOVAL TUNNELED CV CATH: CPT | Performed by: RADIOLOGY

## 2017-07-24 PROCEDURE — 10004125 HB COUNTER-FIRST DAY ADMIT

## 2017-07-24 PROCEDURE — 83735 ASSAY OF MAGNESIUM: CPT

## 2017-07-24 PROCEDURE — 87077 CULTURE AEROBIC IDENTIFY: CPT

## 2017-07-24 PROCEDURE — 87070 CULTURE OTHR SPECIMN AEROBIC: CPT

## 2017-07-24 PROCEDURE — 85025 COMPLETE CBC W/AUTO DIFF WBC: CPT

## 2017-07-24 PROCEDURE — 10002800 HB RX 250 W HCPCS: Performed by: STUDENT IN AN ORGANIZED HEALTH CARE EDUCATION/TRAINING PROGRAM

## 2017-07-24 PROCEDURE — 76705 ECHO EXAM OF ABDOMEN: CPT | Performed by: RADIOLOGY

## 2017-07-24 PROCEDURE — 02PY33Z REMOVAL OF INFUSION DEVICE FROM GREAT VESSEL, PERCUTANEOUS APPROACH: ICD-10-PCS | Performed by: RADIOLOGY

## 2017-07-24 PROCEDURE — 97116 GAIT TRAINING THERAPY: CPT

## 2017-07-24 PROCEDURE — 10002803 HB RX 637: Performed by: INTERNAL MEDICINE

## 2017-07-24 PROCEDURE — 99253 IP/OBS CNSLTJ NEW/EST LOW 45: CPT | Performed by: INTERNAL MEDICINE

## 2017-07-24 PROCEDURE — 87641 MR-STAPH DNA AMP PROBE: CPT

## 2017-07-24 PROCEDURE — 84145 PROCALCITONIN (PCT): CPT

## 2017-07-24 PROCEDURE — 10002800 HB RX 250 W HCPCS: Performed by: INTERNAL MEDICINE

## 2017-07-24 PROCEDURE — 36569 INSJ PICC 5 YR+ W/O IMAGING: CPT

## 2017-07-24 PROCEDURE — 87186 SC STD MICRODIL/AGAR DIL: CPT

## 2017-07-24 PROCEDURE — 86140 C-REACTIVE PROTEIN: CPT

## 2017-07-24 PROCEDURE — 85610 PROTHROMBIN TIME: CPT

## 2017-07-24 PROCEDURE — 87149 DNA/RNA DIRECT PROBE: CPT

## 2017-07-24 PROCEDURE — 99232 SBSQ HOSP IP/OBS MODERATE 35: CPT | Performed by: HOSPITALIST

## 2017-07-24 PROCEDURE — 10002801 HB RX 250 W/O HCPCS: Performed by: RADIOLOGY

## 2017-07-24 PROCEDURE — 10002803 HB RX 637: Performed by: STUDENT IN AN ORGANIZED HEALTH CARE EDUCATION/TRAINING PROGRAM

## 2017-07-24 PROCEDURE — 10002800 HB RX 250 W HCPCS: Performed by: HOSPITALIST

## 2017-07-24 PROCEDURE — 87040 BLOOD CULTURE FOR BACTERIA: CPT

## 2017-07-24 PROCEDURE — 10002807 HB RX 258: Performed by: INTERNAL MEDICINE

## 2017-07-24 PROCEDURE — 97530 THERAPEUTIC ACTIVITIES: CPT

## 2017-07-24 PROCEDURE — 10002801 HB RX 250 W/O HCPCS: Performed by: INTERNAL MEDICINE

## 2017-07-24 RX ORDER — OLANZAPINE 2.5 MG/1
0.62 TABLET ORAL NIGHTLY
Status: DISCONTINUED | OUTPATIENT
Start: 2017-07-24 | End: 2017-08-01 | Stop reason: HOSPADM

## 2017-07-24 RX ORDER — IRON POLYSACCHARIDE COMPLEX 150 MG
150 CAPSULE ORAL
Status: DISCONTINUED | OUTPATIENT
Start: 2017-07-24 | End: 2017-08-01 | Stop reason: HOSPADM

## 2017-07-24 RX ORDER — SODIUM CHLORIDE 9 MG/ML
INJECTION, SOLUTION INTRAVENOUS CONTINUOUS
Status: DISCONTINUED | OUTPATIENT
Start: 2017-07-24 | End: 2017-07-25

## 2017-07-24 RX ORDER — METOCLOPRAMIDE HYDROCHLORIDE 5 MG/ML
5 INJECTION INTRAMUSCULAR; INTRAVENOUS EVERY 6 HOURS PRN
Status: DISCONTINUED | OUTPATIENT
Start: 2017-07-24 | End: 2017-08-01 | Stop reason: HOSPADM

## 2017-07-24 RX ORDER — LIDOCAINE HYDROCHLORIDE 10 MG/ML
INJECTION, SOLUTION INFILTRATION; PERINEURAL PRN
Status: COMPLETED | OUTPATIENT
Start: 2017-07-24 | End: 2017-07-24

## 2017-07-24 RX ORDER — FAMOTIDINE 10 MG/ML
20 INJECTION, SOLUTION INTRAVENOUS EVERY 12 HOURS SCHEDULED
Status: DISCONTINUED | OUTPATIENT
Start: 2017-07-24 | End: 2017-07-25

## 2017-07-24 RX ORDER — MULTIVITAMIN,THER AND MINERALS
1 TABLET ORAL DAILY
Status: DISCONTINUED | OUTPATIENT
Start: 2017-07-24 | End: 2017-08-01 | Stop reason: HOSPADM

## 2017-07-24 RX ORDER — L. ACIDOPHILUS/PECTIN, CITRUS 25MM-100MG
1 TABLET ORAL 2 TIMES DAILY
Status: DISCONTINUED | OUTPATIENT
Start: 2017-07-24 | End: 2017-08-01 | Stop reason: HOSPADM

## 2017-07-24 RX ORDER — WARFARIN SODIUM 10 MG/1
10 TABLET ORAL ONCE
Status: COMPLETED | OUTPATIENT
Start: 2017-07-24 | End: 2017-07-24

## 2017-07-24 RX ORDER — OMEGA-3-ACID ETHYL ESTERS 1 G/1
2 CAPSULE, LIQUID FILLED ORAL DAILY
Status: DISCONTINUED | OUTPATIENT
Start: 2017-07-24 | End: 2017-08-01 | Stop reason: HOSPADM

## 2017-07-24 RX ORDER — ASCORBIC ACID 500 MG
500 TABLET ORAL DAILY
Status: DISCONTINUED | OUTPATIENT
Start: 2017-07-24 | End: 2017-08-01 | Stop reason: HOSPADM

## 2017-07-24 RX ORDER — PAROXETINE HYDROCHLORIDE 20 MG/1
20 TABLET, FILM COATED ORAL
Status: DISCONTINUED | OUTPATIENT
Start: 2017-07-24 | End: 2017-08-01 | Stop reason: HOSPADM

## 2017-07-24 RX ORDER — CLONAZEPAM 0.5 MG/1
0.5 TABLET ORAL NIGHTLY
Status: DISCONTINUED | OUTPATIENT
Start: 2017-07-24 | End: 2017-08-01 | Stop reason: HOSPADM

## 2017-07-24 RX ORDER — EPOPROSTENOL 0.5 MG/10ML
8.5 INJECTION, POWDER, LYOPHILIZED, FOR SOLUTION INTRAVENOUS CONTINUOUS
Status: DISCONTINUED | OUTPATIENT
Start: 2017-07-24 | End: 2017-07-24

## 2017-07-24 RX ADMIN — VANCOMYCIN HYDROCHLORIDE 1500 MG: 10 INJECTION, POWDER, LYOPHILIZED, FOR SOLUTION INTRAVENOUS at 15:03

## 2017-07-24 RX ADMIN — SODIUM CHLORIDE: 9 INJECTION, SOLUTION INTRAVENOUS at 02:34

## 2017-07-24 RX ADMIN — WARFARIN SODIUM 10 MG: 10 TABLET ORAL at 20:39

## 2017-07-24 RX ADMIN — MAGNESIUM SULFATE HEPTAHYDRATE 2 G: 500 INJECTION, SOLUTION INTRAMUSCULAR; INTRAVENOUS at 20:41

## 2017-07-24 RX ADMIN — FAMOTIDINE 20 MG: 10 INJECTION, SOLUTION INTRAVENOUS at 13:50

## 2017-07-24 RX ADMIN — Medication 1 TABLET: at 20:39

## 2017-07-24 RX ADMIN — OLANZAPINE 0.62 MG: 2.5 TABLET, FILM COATED ORAL at 20:48

## 2017-07-24 RX ADMIN — ONDANSETRON 4 MG: 2 SOLUTION INTRAMUSCULAR; INTRAVENOUS at 15:07

## 2017-07-24 RX ADMIN — ACETAMINOPHEN 325 MG: 325 TABLET ORAL at 11:04

## 2017-07-24 RX ADMIN — ACETAMINOPHEN 325 MG: 325 TABLET ORAL at 20:40

## 2017-07-24 RX ADMIN — Medication 2 ML: at 09:58

## 2017-07-24 RX ADMIN — Medication 2 ML: at 20:49

## 2017-07-24 RX ADMIN — CEFAZOLIN 2000 MG: 10 INJECTION, POWDER, FOR SOLUTION INTRAVENOUS at 21:59

## 2017-07-24 RX ADMIN — SODIUM CHLORIDE: 9 INJECTION, SOLUTION INTRAVENOUS at 19:00

## 2017-07-24 RX ADMIN — METOCLOPRAMIDE 5 MG: 5 INJECTION, SOLUTION INTRAMUSCULAR; INTRAVENOUS at 18:59

## 2017-07-24 RX ADMIN — ONDANSETRON 4 MG: 2 SOLUTION INTRAMUSCULAR; INTRAVENOUS at 11:15

## 2017-07-24 RX ADMIN — ASPIRIN 81 MG: 81 TABLET ORAL at 09:57

## 2017-07-24 RX ADMIN — FAMOTIDINE 20 MG: 10 INJECTION, SOLUTION INTRAVENOUS at 02:38

## 2017-07-24 RX ADMIN — ACETAMINOPHEN 325 MG: 325 TABLET ORAL at 15:07

## 2017-07-24 RX ADMIN — EPOPROSTENOL 8.5 NG/KG/MIN: 1.5 INJECTION, POWDER, LYOPHILIZED, FOR SOLUTION INTRAVENOUS at 20:56

## 2017-07-24 RX ADMIN — ACETAMINOPHEN 325 MG: 325 TABLET ORAL at 05:22

## 2017-07-24 RX ADMIN — ACETAMINOPHEN 325 MG: 325 TABLET ORAL at 01:08

## 2017-07-24 RX ADMIN — PAROXETINE 20 MG: 20 TABLET, FILM COATED ORAL at 15:08

## 2017-07-24 RX ADMIN — CLONAZEPAM 0.5 MG: 0.5 TABLET ORAL at 20:48

## 2017-07-24 RX ADMIN — EPOPROSTENOL 8.5 NG/KG/MIN: 1.5 INJECTION, POWDER, LYOPHILIZED, FOR SOLUTION INTRAVENOUS at 09:43

## 2017-07-24 RX ADMIN — LIDOCAINE HYDROCHLORIDE 10 ML: 10 INJECTION, SOLUTION INFILTRATION; PERINEURAL at 19:27

## 2017-07-24 RX ADMIN — CEFAZOLIN 2000 MG: 10 INJECTION, POWDER, FOR SOLUTION INTRAVENOUS at 13:52

## 2017-07-24 ASSESSMENT — ACTIVITIES OF DAILY LIVING (ADL)
CHRONIC_PAIN_PRESENT: NO
ADL_BEFORE_ADMISSION: INDEPENDENT
ADL_SHORT_OF_BREATH: NO
PRIOR_ADL_TOILETING: INDEPENDENT
PRIOR_ADL_BATHING: INDEPENDENT
PRIOR_ADL: INDEPENDENT
RECENT_DECLINE_ADL: YES, ACUTE ILLNESS WITHOUT THERAPY NEEDS
ADL_SCORE: 12

## 2017-07-24 ASSESSMENT — LIFESTYLE VARIABLES
E-CIGARETTE_USE: NO E-CIGARETTES USE IN THE LAST 30 DAYS
HOW OFTEN DO YOU HAVE A DRINK CONTAINING ALCOHOL: NEVER
HOW MANY STANDARD DRINKS CONTAINING ALCOHOL DO YOU HAVE ON A TYPICAL DAY: 0,1 OR 2
ALCOHOL_USE_STATUS: NO OR LOW RISK WITH VALIDATED TOOL
TOBACCO_USE_STATUS_IN_THE_LAST_30_DAYS: NO TOBACCO USED IN THE LAST 30 DAYS

## 2017-07-24 ASSESSMENT — COGNITIVE AND FUNCTIONAL STATUS - GENERAL
ARE YOU BLIND OR DO YOU HAVE SERIOUS DIFFICULTY SEEING, EVEN WHEN WEARING GLASSES: NO
ARE YOU DEAF OR DO YOU HAVE SERIOUS DIFFICULTY  HEARING: NO

## 2017-07-25 ENCOUNTER — APPOINTMENT (OUTPATIENT)
Dept: GENERAL RADIOLOGY | Age: 67
DRG: 314 | End: 2017-07-25
Attending: INTERNAL MEDICINE

## 2017-07-25 ENCOUNTER — APPOINTMENT (OUTPATIENT)
Dept: GENERAL RADIOLOGY | Age: 67
DRG: 314 | End: 2017-07-25
Attending: HOSPITALIST

## 2017-07-25 LAB
ANION GAP SERPL CALC-SCNC: 14 MMOL/L (ref 10–20)
ANNOTATION COMMENT IMP: ABNORMAL
APPEARANCE UR: ABNORMAL
ATRIAL RATE (BPM): 108
BACTERIA #/AREA URNS HPF: ABNORMAL /HPF
BACTERIA BLD CULT: ABNORMAL
BILIRUB UR QL STRIP: NEGATIVE
BUN SERPL-MCNC: 13 MG/DL (ref 6–20)
BUN/CREAT SERPL: 18 (ref 7–25)
CALCIUM SERPL-MCNC: 7.7 MG/DL (ref 8.4–10.2)
CHLORIDE SERPL-SCNC: 100 MMOL/L (ref 98–107)
CO2 SERPL-SCNC: 23 MMOL/L (ref 21–32)
COLOR UR: ABNORMAL
CREAT SERPL-MCNC: 0.72 MG/DL (ref 0.51–0.95)
ERYTHROCYTE [DISTWIDTH] IN BLOOD: 19.5 % (ref 11–15)
GLUCOSE SERPL-MCNC: 149 MG/DL (ref 65–99)
GLUCOSE UR STRIP-MCNC: NEGATIVE MG/DL
GRAM STN SPEC: ABNORMAL
GRAN CASTS #/AREA URNS LPF: ABNORMAL /LPF
HCT VFR BLD CALC: 32.6 % (ref 36–46.5)
HGB BLD-MCNC: 10.5 G/DL (ref 12–15.5)
HGB UR QL STRIP: ABNORMAL
HYALINE CASTS #/AREA URNS LPF: ABNORMAL /LPF (ref 0–5)
INR PPP: 3
KETONES UR STRIP-MCNC: NEGATIVE MG/DL
LEUKOCYTE ESTERASE UR QL STRIP: ABNORMAL
MAGNESIUM SERPL-MCNC: 1.7 MG/DL (ref 1.7–2.4)
MAGNESIUM SERPL-MCNC: 2.5 MG/DL (ref 1.7–2.4)
MCH RBC QN AUTO: 26.1 PG (ref 26–34)
MCHC RBC AUTO-ENTMCNC: 32.2 G/DL (ref 32–36.5)
MCV RBC AUTO: 80.9 FL (ref 78–100)
NITRITE UR QL STRIP: NEGATIVE
ORGANISM: ABNORMAL
P AXIS (DEGREES): 40
PH UR STRIP: 5.5 UNITS (ref 5–7)
PLATELET # BLD: 147 K/MCL (ref 140–450)
POTASSIUM SERPL-SCNC: 2.8 MMOL/L (ref 3.4–5.1)
POTASSIUM SERPL-SCNC: 3.5 MMOL/L (ref 3.4–5.1)
PR-INTERVAL (MSEC): 154
PROT UR STRIP-MCNC: 30 MG/DL
PROTHROMBIN TIME: 32.2 SEC (ref 9.7–11.8)
QRS-INTERVAL (MSEC): 72
QT-INTERVAL (MSEC): 324
QTC: 434
R AXIS (DEGREES): 55
RBC # BLD: 4.03 MIL/MCL (ref 4–5.2)
RBC #/AREA URNS HPF: ABNORMAL /HPF (ref 0–3)
REPORT STATUS (RPT): ABNORMAL
REPORT TEXT: NORMAL
SODIUM SERPL-SCNC: 134 MMOL/L (ref 135–145)
SP GR UR STRIP: 1.02 (ref 1–1.03)
SPECIMEN SOURCE: ABNORMAL
SQUAMOUS #/AREA URNS HPF: ABNORMAL /HPF (ref 0–5)
T AXIS (DEGREES): 69
UROBILINOGEN UR STRIP-MCNC: 0.2 MG/DL (ref 0–1)
VENTRICULAR RATE EKG/MIN (BPM): 108
WBC # BLD: 6.4 K/MCL (ref 4.2–11)
WBC #/AREA URNS HPF: ABNORMAL /HPF (ref 0–5)

## 2017-07-25 PROCEDURE — 84132 ASSAY OF SERUM POTASSIUM: CPT

## 2017-07-25 PROCEDURE — 10002801 HB RX 250 W/O HCPCS: Performed by: INTERNAL MEDICINE

## 2017-07-25 PROCEDURE — 83735 ASSAY OF MAGNESIUM: CPT

## 2017-07-25 PROCEDURE — 81001 URINALYSIS AUTO W/SCOPE: CPT

## 2017-07-25 PROCEDURE — 10004180 HB COUNTER-TRANSPORT

## 2017-07-25 PROCEDURE — 71010 XR CHEST AP OR PA: CPT

## 2017-07-25 PROCEDURE — 10002800 HB RX 250 W HCPCS: Performed by: INTERNAL MEDICINE

## 2017-07-25 PROCEDURE — 10004125 HB COUNTER-FIRST DAY ADMIT

## 2017-07-25 PROCEDURE — 93010 ELECTROCARDIOGRAM REPORT: CPT | Performed by: INTERNAL MEDICINE

## 2017-07-25 PROCEDURE — 10000008 HB ROOM CHARGE ICU OR CCU

## 2017-07-25 PROCEDURE — 93005 ELECTROCARDIOGRAM TRACING: CPT | Performed by: INTERNAL MEDICINE

## 2017-07-25 PROCEDURE — 85027 COMPLETE CBC AUTOMATED: CPT

## 2017-07-25 PROCEDURE — 10002807 HB RX 258

## 2017-07-25 PROCEDURE — 10004651 HB RX, NO CHARGE ITEM: Performed by: INTERNAL MEDICINE

## 2017-07-25 PROCEDURE — 87086 URINE CULTURE/COLONY COUNT: CPT

## 2017-07-25 PROCEDURE — 94660 CPAP INITIATION&MGMT: CPT

## 2017-07-25 PROCEDURE — 10004281 HB COUNTER-STAFF TIME PER 15 MIN

## 2017-07-25 PROCEDURE — 10002800 HB RX 250 W HCPCS: Performed by: STUDENT IN AN ORGANIZED HEALTH CARE EDUCATION/TRAINING PROGRAM

## 2017-07-25 PROCEDURE — 99254 IP/OBS CNSLTJ NEW/EST MOD 60: CPT | Performed by: INTERNAL MEDICINE

## 2017-07-25 PROCEDURE — 85610 PROTHROMBIN TIME: CPT

## 2017-07-25 PROCEDURE — 10002803 HB RX 637: Performed by: STUDENT IN AN ORGANIZED HEALTH CARE EDUCATION/TRAINING PROGRAM

## 2017-07-25 PROCEDURE — 10002803 HB RX 637: Performed by: INTERNAL MEDICINE

## 2017-07-25 PROCEDURE — 10002800 HB RX 250 W HCPCS

## 2017-07-25 PROCEDURE — 99291 CRITICAL CARE FIRST HOUR: CPT | Performed by: INTERNAL MEDICINE

## 2017-07-25 PROCEDURE — 71010 XR CHEST AP OR PA: CPT | Performed by: RADIOLOGY

## 2017-07-25 PROCEDURE — 10004325 HB COUNTER ASSESSMENT EA 15 MIN

## 2017-07-25 PROCEDURE — 80048 BASIC METABOLIC PNL TOTAL CA: CPT

## 2017-07-25 RX ORDER — FUROSEMIDE 10 MG/ML
40 INJECTION INTRAMUSCULAR; INTRAVENOUS ONCE
Status: DISCONTINUED | OUTPATIENT
Start: 2017-07-25 | End: 2017-07-28

## 2017-07-25 RX ORDER — FUROSEMIDE 10 MG/ML
INJECTION INTRAMUSCULAR; INTRAVENOUS
Status: DISPENSED
Start: 2017-07-25 | End: 2017-07-25

## 2017-07-25 RX ORDER — CHLORHEXIDINE GLUCONATE ORAL RINSE 1.2 MG/ML
15 SOLUTION DENTAL EVERY 12 HOURS SCHEDULED
Status: DISCONTINUED | OUTPATIENT
Start: 2017-07-25 | End: 2017-07-28

## 2017-07-25 RX ORDER — SODIUM CHLORIDE 9 MG/ML
INJECTION, SOLUTION INTRAVENOUS
Status: COMPLETED
Start: 2017-07-25 | End: 2017-07-25

## 2017-07-25 RX ORDER — WARFARIN SODIUM 10 MG/1
10 TABLET ORAL ONCE
Status: COMPLETED | OUTPATIENT
Start: 2017-07-25 | End: 2017-07-25

## 2017-07-25 RX ORDER — FAMOTIDINE 20 MG/1
20 TABLET, FILM COATED ORAL EVERY 12 HOURS SCHEDULED
Status: DISCONTINUED | OUTPATIENT
Start: 2017-07-25 | End: 2017-07-28

## 2017-07-25 RX ORDER — CHLORHEXIDINE GLUCONATE ORAL RINSE 1.2 MG/ML
15 SOLUTION DENTAL PRN
Status: DISCONTINUED | OUTPATIENT
Start: 2017-07-25 | End: 2017-07-28

## 2017-07-25 RX ORDER — SODIUM CHLORIDE 9 MG/ML
INJECTION, SOLUTION INTRAVENOUS CONTINUOUS PRN
Status: DISCONTINUED | OUTPATIENT
Start: 2017-07-25 | End: 2017-08-01 | Stop reason: HOSPADM

## 2017-07-25 RX ORDER — SODIUM CHLORIDE 9 MG/ML
INJECTION, SOLUTION INTRAVENOUS CONTINUOUS
Status: DISCONTINUED | OUTPATIENT
Start: 2017-07-25 | End: 2017-08-01 | Stop reason: HOSPADM

## 2017-07-25 RX ORDER — FUROSEMIDE 10 MG/ML
INJECTION INTRAMUSCULAR; INTRAVENOUS
Status: COMPLETED
Start: 2017-07-25 | End: 2017-07-25

## 2017-07-25 RX ADMIN — SODIUM CHLORIDE 1000 ML: 9 INJECTION, SOLUTION INTRAVENOUS at 09:30

## 2017-07-25 RX ADMIN — FAMOTIDINE 20 MG: 20 TABLET, FILM COATED ORAL at 21:15

## 2017-07-25 RX ADMIN — FUROSEMIDE 40 MG: 10 INJECTION, SOLUTION INTRAVENOUS at 03:36

## 2017-07-25 RX ADMIN — ACETAMINOPHEN 650 MG: 325 TABLET ORAL at 10:08

## 2017-07-25 RX ADMIN — CLONAZEPAM 0.5 MG: 0.5 TABLET ORAL at 21:15

## 2017-07-25 RX ADMIN — Medication 10 ML: at 21:16

## 2017-07-25 RX ADMIN — VANCOMYCIN HYDROCHLORIDE 1250 MG: 10 INJECTION, POWDER, LYOPHILIZED, FOR SOLUTION INTRAVENOUS at 09:58

## 2017-07-25 RX ADMIN — VANCOMYCIN HYDROCHLORIDE 1250 MG: 10 INJECTION, POWDER, LYOPHILIZED, FOR SOLUTION INTRAVENOUS at 21:24

## 2017-07-25 RX ADMIN — OMEGA-3-ACID ETHYL ESTERS 1 G: 1 CAPSULE, LIQUID FILLED ORAL at 09:58

## 2017-07-25 RX ADMIN — FAMOTIDINE 20 MG: 10 INJECTION, SOLUTION INTRAVENOUS at 10:08

## 2017-07-25 RX ADMIN — Medication 2 ML: at 10:13

## 2017-07-25 RX ADMIN — CEFAZOLIN 2000 MG: 10 INJECTION, POWDER, FOR SOLUTION INTRAVENOUS at 09:24

## 2017-07-25 RX ADMIN — Medication 1 TABLET: at 09:58

## 2017-07-25 RX ADMIN — POTASSIUM CHLORIDE 40 MEQ: 14.9 INJECTION, SOLUTION INTRAVENOUS at 12:27

## 2017-07-25 RX ADMIN — PAROXETINE 20 MG: 20 TABLET, FILM COATED ORAL at 13:40

## 2017-07-25 RX ADMIN — Medication 2 ML: at 21:15

## 2017-07-25 RX ADMIN — ACETAMINOPHEN 325 MG: 325 TABLET ORAL at 16:22

## 2017-07-25 RX ADMIN — POTASSIUM CHLORIDE 40 MEQ: 14.9 INJECTION, SOLUTION INTRAVENOUS at 18:35

## 2017-07-25 RX ADMIN — OLANZAPINE 0.62 MG: 2.5 TABLET, FILM COATED ORAL at 21:15

## 2017-07-25 RX ADMIN — WARFARIN SODIUM 10 MG: 10 TABLET ORAL at 19:12

## 2017-07-25 RX ADMIN — Medication 150 MG: at 16:19

## 2017-07-25 RX ADMIN — CEFAZOLIN 2000 MG: 10 INJECTION, POWDER, FOR SOLUTION INTRAVENOUS at 16:19

## 2017-07-25 RX ADMIN — FAMOTIDINE 20 MG: 10 INJECTION, SOLUTION INTRAVENOUS at 00:20

## 2017-07-25 RX ADMIN — Medication 1 TABLET: at 16:19

## 2017-07-25 RX ADMIN — ASPIRIN 81 MG: 81 TABLET ORAL at 10:08

## 2017-07-25 RX ADMIN — CEFAZOLIN 2000 MG: 10 INJECTION, POWDER, FOR SOLUTION INTRAVENOUS at 23:39

## 2017-07-25 RX ADMIN — ACETAMINOPHEN 325 MG: 325 TABLET ORAL at 20:39

## 2017-07-25 RX ADMIN — MAGNESIUM SULFATE HEPTAHYDRATE 2 G: 500 INJECTION, SOLUTION INTRAMUSCULAR; INTRAVENOUS at 14:57

## 2017-07-25 RX ADMIN — VANCOMYCIN HYDROCHLORIDE 1250 MG: 10 INJECTION, POWDER, LYOPHILIZED, FOR SOLUTION INTRAVENOUS at 00:23

## 2017-07-25 ASSESSMENT — PAIN SCALES - GENERAL
PAIN_LEVEL_AT_REST: 7
PAIN_LEVEL_AT_REST: 0
PAIN_LEVEL_AT_REST: 6
PAIN_LEVEL_AT_REST: 6
PAIN_LEVEL_WITH_ACTIVITY: 6
PAIN_LEVEL_WITH_ACTIVITY: 6
PAIN_LEVEL_AT_REST: 0
PAIN_LEVEL_AT_REST: 6
PAIN_LEVEL_WITH_ACTIVITY: 7
PAIN_LEVEL_WITH_ACTIVITY: 6
PAIN_LEVEL_AT_REST: 0
PAIN_LEVEL_AT_REST: 7

## 2017-07-26 LAB
ANION GAP SERPL CALC-SCNC: 12 MMOL/L (ref 10–20)
BACTERIA UR CULT: NORMAL
BUN SERPL-MCNC: 17 MG/DL (ref 6–20)
BUN/CREAT SERPL: 22 (ref 7–25)
CALCIUM SERPL-MCNC: 8.1 MG/DL (ref 8.4–10.2)
CHLORIDE SERPL-SCNC: 101 MMOL/L (ref 98–107)
CO2 SERPL-SCNC: 25 MMOL/L (ref 21–32)
CREAT SERPL-MCNC: 0.76 MG/DL (ref 0.51–0.95)
GLUCOSE SERPL-MCNC: 93 MG/DL (ref 65–99)
INR PPP: 2.7
POTASSIUM SERPL-SCNC: 4.1 MMOL/L (ref 3.4–5.1)
PROTHROMBIN TIME: 29.3 SEC (ref 9.7–11.8)
REPORT STATUS (RPT): NORMAL
SODIUM SERPL-SCNC: 134 MMOL/L (ref 135–145)
SPECIMEN SOURCE: NORMAL

## 2017-07-26 PROCEDURE — 10000008 HB ROOM CHARGE ICU OR CCU

## 2017-07-26 PROCEDURE — 87040 BLOOD CULTURE FOR BACTERIA: CPT

## 2017-07-26 PROCEDURE — 97530 THERAPEUTIC ACTIVITIES: CPT

## 2017-07-26 PROCEDURE — 99233 SBSQ HOSP IP/OBS HIGH 50: CPT | Performed by: INTERNAL MEDICINE

## 2017-07-26 PROCEDURE — 97116 GAIT TRAINING THERAPY: CPT

## 2017-07-26 PROCEDURE — 10002800 HB RX 250 W HCPCS: Performed by: INTERNAL MEDICINE

## 2017-07-26 PROCEDURE — 10004651 HB RX, NO CHARGE ITEM: Performed by: INTERNAL MEDICINE

## 2017-07-26 PROCEDURE — 94660 CPAP INITIATION&MGMT: CPT

## 2017-07-26 PROCEDURE — 97535 SELF CARE MNGMENT TRAINING: CPT

## 2017-07-26 PROCEDURE — 10004173 HB COUNTER-THERAPY VISIT PT

## 2017-07-26 PROCEDURE — 10002803 HB RX 637: Performed by: INTERNAL MEDICINE

## 2017-07-26 PROCEDURE — 10002800 HB RX 250 W HCPCS: Performed by: STUDENT IN AN ORGANIZED HEALTH CARE EDUCATION/TRAINING PROGRAM

## 2017-07-26 PROCEDURE — 85610 PROTHROMBIN TIME: CPT

## 2017-07-26 PROCEDURE — 10002807 HB RX 258: Performed by: INTERNAL MEDICINE

## 2017-07-26 PROCEDURE — 10004172 HB COUNTER-THERAPY VISIT OT

## 2017-07-26 PROCEDURE — 97164 PT RE-EVAL EST PLAN CARE: CPT

## 2017-07-26 PROCEDURE — 10002803 HB RX 637: Performed by: STUDENT IN AN ORGANIZED HEALTH CARE EDUCATION/TRAINING PROGRAM

## 2017-07-26 PROCEDURE — 36415 COLL VENOUS BLD VENIPUNCTURE: CPT

## 2017-07-26 PROCEDURE — 97165 OT EVAL LOW COMPLEX 30 MIN: CPT

## 2017-07-26 PROCEDURE — 80048 BASIC METABOLIC PNL TOTAL CA: CPT

## 2017-07-26 RX ORDER — WARFARIN SODIUM 10 MG/1
10 TABLET ORAL EVERY EVENING
Status: COMPLETED | OUTPATIENT
Start: 2017-07-26 | End: 2017-07-26

## 2017-07-26 RX ADMIN — Medication 1 TABLET: at 09:53

## 2017-07-26 RX ADMIN — CEFAZOLIN 2000 MG: 10 INJECTION, POWDER, FOR SOLUTION INTRAVENOUS at 21:20

## 2017-07-26 RX ADMIN — CEFAZOLIN 2000 MG: 10 INJECTION, POWDER, FOR SOLUTION INTRAVENOUS at 14:33

## 2017-07-26 RX ADMIN — VITAMIN D, TAB 1000IU (100/BT) 1000 UNITS: 25 TAB at 09:53

## 2017-07-26 RX ADMIN — Medication 2 ML: at 21:12

## 2017-07-26 RX ADMIN — WARFARIN SODIUM 10 MG: 10 TABLET ORAL at 18:18

## 2017-07-26 RX ADMIN — OLANZAPINE 0.62 MG: 2.5 TABLET, FILM COATED ORAL at 21:11

## 2017-07-26 RX ADMIN — CLONAZEPAM 0.5 MG: 0.5 TABLET ORAL at 21:11

## 2017-07-26 RX ADMIN — OXYCODONE HYDROCHLORIDE AND ACETAMINOPHEN 500 MG: 500 TABLET ORAL at 09:53

## 2017-07-26 RX ADMIN — EPOPROSTENOL 8.4 NG/KG/MIN: 1.5 INJECTION, POWDER, LYOPHILIZED, FOR SOLUTION INTRAVENOUS at 00:00

## 2017-07-26 RX ADMIN — ASPIRIN 81 MG: 81 TABLET ORAL at 09:53

## 2017-07-26 RX ADMIN — FAMOTIDINE 20 MG: 20 TABLET, FILM COATED ORAL at 09:53

## 2017-07-26 RX ADMIN — Medication 150 MG: at 18:18

## 2017-07-26 RX ADMIN — FAMOTIDINE 20 MG: 20 TABLET, FILM COATED ORAL at 21:11

## 2017-07-26 RX ADMIN — ONDANSETRON 4 MG: 2 SOLUTION INTRAMUSCULAR; INTRAVENOUS at 07:57

## 2017-07-26 RX ADMIN — PAROXETINE 20 MG: 20 TABLET, FILM COATED ORAL at 12:43

## 2017-07-26 RX ADMIN — Medication 1 TABLET: at 18:18

## 2017-07-26 RX ADMIN — OMEGA-3-ACID ETHYL ESTERS 2 G: 1 CAPSULE, LIQUID FILLED ORAL at 09:53

## 2017-07-26 RX ADMIN — CEFAZOLIN 2000 MG: 10 INJECTION, POWDER, FOR SOLUTION INTRAVENOUS at 06:04

## 2017-07-26 RX ADMIN — Medication 2 ML: at 09:53

## 2017-07-26 RX ADMIN — SODIUM CHLORIDE: 9 INJECTION, SOLUTION INTRAVENOUS at 06:05

## 2017-07-26 ASSESSMENT — ACTIVITIES OF DAILY LIVING (ADL)
PRIOR_ADL_BATHING: INDEPENDENT
PRIOR_ADL: INDEPENDENT
PRIOR_ADL_BATHING: INDEPENDENT
TOILETING: NEEDS ASSISTANCE
BATHING: NEEDS ASSISTANCE
BATHING: NEEDS ASSISTANCE
ADL_SCORE: 7
FEEDING: INDEPENDENT
TOILETING: NEEDS ASSISTANCE
ADL_SCORE: 7
DRESSING: NEEDS ASSISTANCE
DRESSING: NEEDS ASSISTANCE
ADL_SCORE: 8
PRIOR_ADL_TOILETING: INDEPENDENT
PRIOR_ADL: INDEPENDENT
BATHING: NEEDS ASSISTANCE
FEEDING: INDEPENDENT
DRESSING: NEEDS ASSISTANCE
PRIOR_ADL_TOILETING: INDEPENDENT
TOILETING: NEEDS ASSISTANCE
FEEDING: INDEPENDENT

## 2017-07-26 ASSESSMENT — PAIN SCALES - GENERAL
PAIN_LEVEL_AT_REST: 0
PAIN_LEVEL_WITH_ACTIVITY: 0

## 2017-07-27 ENCOUNTER — APPOINTMENT (OUTPATIENT)
Dept: TRANSPLANT | Age: 67
End: 2017-07-27
Attending: INTERNAL MEDICINE

## 2017-07-27 LAB
ANNOTATION COMMENT IMP: ABNORMAL
BACTERIA SPEC AEROBE CULT: ABNORMAL
INR PPP: 3.8
INR PPP: 4.3
ORGANISM: ABNORMAL
PROTHROMBIN TIME: 41.4 SEC (ref 9.7–11.8)
PROTHROMBIN TIME: 46.2 SEC (ref 9.7–11.8)
REPORT STATUS (RPT): ABNORMAL
SPECIMEN SOURCE: ABNORMAL

## 2017-07-27 PROCEDURE — 10004172 HB COUNTER-THERAPY VISIT OT

## 2017-07-27 PROCEDURE — 85610 PROTHROMBIN TIME: CPT

## 2017-07-27 PROCEDURE — 97116 GAIT TRAINING THERAPY: CPT

## 2017-07-27 PROCEDURE — 10004651 HB RX, NO CHARGE ITEM: Performed by: INTERNAL MEDICINE

## 2017-07-27 PROCEDURE — 10002803 HB RX 637: Performed by: INTERNAL MEDICINE

## 2017-07-27 PROCEDURE — 10002800 HB RX 250 W HCPCS: Performed by: STUDENT IN AN ORGANIZED HEALTH CARE EDUCATION/TRAINING PROGRAM

## 2017-07-27 PROCEDURE — 10003445 HB TELEMETRY PER DAY

## 2017-07-27 PROCEDURE — 10002803 HB RX 637: Performed by: NURSE PRACTITIONER

## 2017-07-27 PROCEDURE — 99233 SBSQ HOSP IP/OBS HIGH 50: CPT | Performed by: INTERNAL MEDICINE

## 2017-07-27 PROCEDURE — 97110 THERAPEUTIC EXERCISES: CPT

## 2017-07-27 PROCEDURE — 10004173 HB COUNTER-THERAPY VISIT PT

## 2017-07-27 PROCEDURE — 97530 THERAPEUTIC ACTIVITIES: CPT

## 2017-07-27 PROCEDURE — 97535 SELF CARE MNGMENT TRAINING: CPT

## 2017-07-27 PROCEDURE — 99232 SBSQ HOSP IP/OBS MODERATE 35: CPT | Performed by: INTERNAL MEDICINE

## 2017-07-27 PROCEDURE — 36415 COLL VENOUS BLD VENIPUNCTURE: CPT

## 2017-07-27 PROCEDURE — 10004651 HB RX, NO CHARGE ITEM: Performed by: NURSE PRACTITIONER

## 2017-07-27 PROCEDURE — 10002803 HB RX 637: Performed by: STUDENT IN AN ORGANIZED HEALTH CARE EDUCATION/TRAINING PROGRAM

## 2017-07-27 PROCEDURE — 10002800 HB RX 250 W HCPCS: Performed by: NURSE PRACTITIONER

## 2017-07-27 PROCEDURE — 10000002 HB ROOM CHARGE MED SURG

## 2017-07-27 RX ADMIN — CEFAZOLIN 2000 MG: 10 INJECTION, POWDER, FOR SOLUTION INTRAVENOUS at 21:01

## 2017-07-27 RX ADMIN — Medication 1 TABLET: at 10:12

## 2017-07-27 RX ADMIN — Medication 2 ML: at 10:11

## 2017-07-27 RX ADMIN — FAMOTIDINE 20 MG: 20 TABLET, FILM COATED ORAL at 20:50

## 2017-07-27 RX ADMIN — ASPIRIN 81 MG: 81 TABLET ORAL at 10:11

## 2017-07-27 RX ADMIN — RIOCIGUAT 0.5 MG: 0.5 TABLET, FILM COATED ORAL at 20:50

## 2017-07-27 RX ADMIN — EPOPROSTENOL 8.5 NG/KG/MIN: 1.5 INJECTION, POWDER, LYOPHILIZED, FOR SOLUTION INTRAVENOUS at 12:39

## 2017-07-27 RX ADMIN — OXYCODONE HYDROCHLORIDE AND ACETAMINOPHEN 500 MG: 500 TABLET ORAL at 10:11

## 2017-07-27 RX ADMIN — CLONAZEPAM 0.5 MG: 0.5 TABLET ORAL at 20:50

## 2017-07-27 RX ADMIN — FAMOTIDINE 20 MG: 20 TABLET, FILM COATED ORAL at 10:11

## 2017-07-27 RX ADMIN — PAROXETINE 20 MG: 20 TABLET, FILM COATED ORAL at 12:41

## 2017-07-27 RX ADMIN — Medication 1 TABLET: at 10:11

## 2017-07-27 RX ADMIN — Medication 150 MG: at 17:53

## 2017-07-27 RX ADMIN — OMEGA-3-ACID ETHYL ESTERS 2 G: 1 CAPSULE, LIQUID FILLED ORAL at 10:11

## 2017-07-27 RX ADMIN — Medication 10 ML: at 13:42

## 2017-07-27 RX ADMIN — Medication 2 ML: at 20:55

## 2017-07-27 RX ADMIN — VITAMIN D, TAB 1000IU (100/BT) 1000 UNITS: 25 TAB at 10:12

## 2017-07-27 RX ADMIN — OLANZAPINE 0.62 MG: 2.5 TABLET, FILM COATED ORAL at 20:50

## 2017-07-27 RX ADMIN — CEFAZOLIN 2000 MG: 10 INJECTION, POWDER, FOR SOLUTION INTRAVENOUS at 13:43

## 2017-07-27 RX ADMIN — Medication 1 TABLET: at 17:53

## 2017-07-27 RX ADMIN — RIOCIGUAT 0.5 MG: 0.5 TABLET, FILM COATED ORAL at 14:06

## 2017-07-27 RX ADMIN — CEFAZOLIN 2000 MG: 10 INJECTION, POWDER, FOR SOLUTION INTRAVENOUS at 06:15

## 2017-07-27 ASSESSMENT — PAIN SCALES - GENERAL
PAIN_LEVEL_AT_REST: 0
PAIN_LEVEL_WITH_ACTIVITY: 0
PAIN_LEVEL_AT_REST: 0
PAIN_LEVEL_WITH_ACTIVITY: 0
PAIN_LEVEL_AT_REST: 0
PAIN_LEVEL_AT_REST: 0
PAIN_LEVEL_WITH_ACTIVITY: 0
PAIN_LEVEL_AT_REST: 0
PAIN_LEVEL_AT_REST: 0

## 2017-07-28 ENCOUNTER — APPOINTMENT (OUTPATIENT)
Dept: CV DIAGNOSTICS | Age: 67
DRG: 314 | End: 2017-07-28
Attending: STUDENT IN AN ORGANIZED HEALTH CARE EDUCATION/TRAINING PROGRAM

## 2017-07-28 LAB
INR PPP: 4.8
PROTHROMBIN TIME: 51.1 SEC (ref 9.7–11.8)

## 2017-07-28 PROCEDURE — 10002800 HB RX 250 W HCPCS: Performed by: STUDENT IN AN ORGANIZED HEALTH CARE EDUCATION/TRAINING PROGRAM

## 2017-07-28 PROCEDURE — 10004651 HB RX, NO CHARGE ITEM: Performed by: INTERNAL MEDICINE

## 2017-07-28 PROCEDURE — 93312 ECHO TRANSESOPHAGEAL: CPT

## 2017-07-28 PROCEDURE — 97530 THERAPEUTIC ACTIVITIES: CPT

## 2017-07-28 PROCEDURE — 10002803 HB RX 637: Performed by: STUDENT IN AN ORGANIZED HEALTH CARE EDUCATION/TRAINING PROGRAM

## 2017-07-28 PROCEDURE — 10002803 HB RX 637: Performed by: INTERNAL MEDICINE

## 2017-07-28 PROCEDURE — 10004172 HB COUNTER-THERAPY VISIT OT

## 2017-07-28 PROCEDURE — 10004173 HB COUNTER-THERAPY VISIT PT

## 2017-07-28 PROCEDURE — 97535 SELF CARE MNGMENT TRAINING: CPT

## 2017-07-28 PROCEDURE — 10002800 HB RX 250 W HCPCS: Performed by: INTERNAL MEDICINE

## 2017-07-28 PROCEDURE — 99232 SBSQ HOSP IP/OBS MODERATE 35: CPT | Performed by: INTERNAL MEDICINE

## 2017-07-28 PROCEDURE — 10002801 HB RX 250 W/O HCPCS: Performed by: INTERNAL MEDICINE

## 2017-07-28 PROCEDURE — 85610 PROTHROMBIN TIME: CPT

## 2017-07-28 PROCEDURE — 10002800 HB RX 250 W HCPCS: Performed by: NURSE PRACTITIONER

## 2017-07-28 PROCEDURE — 10004651 HB RX, NO CHARGE ITEM: Performed by: NURSE PRACTITIONER

## 2017-07-28 PROCEDURE — 10000002 HB ROOM CHARGE MED SURG

## 2017-07-28 PROCEDURE — 99233 SBSQ HOSP IP/OBS HIGH 50: CPT | Performed by: INTERNAL MEDICINE

## 2017-07-28 PROCEDURE — 93312 ECHO TRANSESOPHAGEAL: CPT | Performed by: INTERNAL MEDICINE

## 2017-07-28 PROCEDURE — 93325 DOPPLER ECHO COLOR FLOW MAPG: CPT | Performed by: INTERNAL MEDICINE

## 2017-07-28 PROCEDURE — 10004325 HB COUNTER ASSESSMENT EA 15 MIN

## 2017-07-28 PROCEDURE — 10002803 HB RX 637: Performed by: NURSE PRACTITIONER

## 2017-07-28 PROCEDURE — 93321 DOPPLER ECHO F-UP/LMTD STD: CPT | Performed by: INTERNAL MEDICINE

## 2017-07-28 PROCEDURE — 10003445 HB TELEMETRY PER DAY

## 2017-07-28 PROCEDURE — B246ZZ4 ULTRASONOGRAPHY OF RIGHT AND LEFT HEART, TRANSESOPHAGEAL: ICD-10-PCS | Performed by: INTERNAL MEDICINE

## 2017-07-28 PROCEDURE — 97116 GAIT TRAINING THERAPY: CPT

## 2017-07-28 PROCEDURE — 10002807 HB RX 258: Performed by: INTERNAL MEDICINE

## 2017-07-28 RX ORDER — MIDAZOLAM HYDROCHLORIDE 1 MG/ML
INJECTION, SOLUTION INTRAMUSCULAR; INTRAVENOUS PRN
Status: COMPLETED | OUTPATIENT
Start: 2017-07-28 | End: 2017-07-28

## 2017-07-28 RX ORDER — LIDOCAINE HYDROCHLORIDE 40 MG/ML
SOLUTION TOPICAL PRN
Status: COMPLETED | OUTPATIENT
Start: 2017-07-28 | End: 2017-07-28

## 2017-07-28 RX ORDER — FLUMAZENIL 0.1 MG/ML
INJECTION, SOLUTION INTRAVENOUS PRN
Status: DISCONTINUED | OUTPATIENT
Start: 2017-07-28 | End: 2017-08-01 | Stop reason: HOSPADM

## 2017-07-28 RX ADMIN — EPOPROSTENOL 4 NG/KG/MIN: 1.5 INJECTION, POWDER, LYOPHILIZED, FOR SOLUTION INTRAVENOUS at 14:00

## 2017-07-28 RX ADMIN — Medication 1 TABLET: at 13:54

## 2017-07-28 RX ADMIN — SODIUM CHLORIDE: 9 INJECTION, SOLUTION INTRAVENOUS at 05:10

## 2017-07-28 RX ADMIN — MIDAZOLAM HYDROCHLORIDE 1 MG: 1 INJECTION, SOLUTION INTRAMUSCULAR; INTRAVENOUS at 11:16

## 2017-07-28 RX ADMIN — Medication 2 ML: at 21:22

## 2017-07-28 RX ADMIN — MIDAZOLAM HYDROCHLORIDE 2 MG: 1 INJECTION, SOLUTION INTRAMUSCULAR; INTRAVENOUS at 11:12

## 2017-07-28 RX ADMIN — Medication 1 TABLET: at 13:50

## 2017-07-28 RX ADMIN — OLANZAPINE 0.62 MG: 2.5 TABLET, FILM COATED ORAL at 21:26

## 2017-07-28 RX ADMIN — PAROXETINE 20 MG: 20 TABLET, FILM COATED ORAL at 13:52

## 2017-07-28 RX ADMIN — VITAMIN D, TAB 1000IU (100/BT) 1000 UNITS: 25 TAB at 13:50

## 2017-07-28 RX ADMIN — CLONAZEPAM 0.5 MG: 0.5 TABLET ORAL at 21:26

## 2017-07-28 RX ADMIN — OXYCODONE HYDROCHLORIDE AND ACETAMINOPHEN 500 MG: 500 TABLET ORAL at 13:48

## 2017-07-28 RX ADMIN — CEFAZOLIN 2000 MG: 10 INJECTION, POWDER, FOR SOLUTION INTRAVENOUS at 05:18

## 2017-07-28 RX ADMIN — ASPIRIN 81 MG: 81 TABLET ORAL at 13:49

## 2017-07-28 RX ADMIN — FENTANYL CITRATE 25 MCG: 50 INJECTION INTRAMUSCULAR; INTRAVENOUS at 11:12

## 2017-07-28 RX ADMIN — MIDAZOLAM HYDROCHLORIDE 2 MG: 1 INJECTION, SOLUTION INTRAMUSCULAR; INTRAVENOUS at 11:14

## 2017-07-28 RX ADMIN — Medication 10 ML: at 21:22

## 2017-07-28 RX ADMIN — CEFAZOLIN 2000 MG: 10 INJECTION, POWDER, FOR SOLUTION INTRAVENOUS at 21:22

## 2017-07-28 RX ADMIN — RIOCIGUAT 1 MG: 1 TABLET, FILM COATED ORAL at 21:26

## 2017-07-28 RX ADMIN — Medication 150 MG: at 17:49

## 2017-07-28 RX ADMIN — CEFAZOLIN 2000 MG: 10 INJECTION, POWDER, FOR SOLUTION INTRAVENOUS at 15:37

## 2017-07-28 RX ADMIN — RIOCIGUAT 1 MG: 1 TABLET, FILM COATED ORAL at 13:53

## 2017-07-28 RX ADMIN — FLUMAZENIL 0.1 MG: 0.1 INJECTION, SOLUTION INTRAVENOUS at 11:21

## 2017-07-28 RX ADMIN — OMEGA-3-ACID ETHYL ESTERS 2 G: 1 CAPSULE, LIQUID FILLED ORAL at 13:50

## 2017-07-28 RX ADMIN — Medication 1 TABLET: at 17:49

## 2017-07-28 RX ADMIN — FLUMAZENIL 0.1 MG: 0.1 INJECTION, SOLUTION INTRAVENOUS at 11:45

## 2017-07-28 RX ADMIN — LIDOCAINE HYDROCHLORIDE 200 MG: 40 SOLUTION TOPICAL at 11:12

## 2017-07-28 ASSESSMENT — PAIN SCALES - GENERAL
PAIN_LEVEL_AT_REST: 0
PAIN_LEVEL_WITH_ACTIVITY: 0
PAIN_LEVEL_AT_REST: 0
PAIN_LEVEL_WITH_ACTIVITY: 0
PAIN_LEVEL_AT_REST: 0

## 2017-07-29 LAB
ANION GAP SERPL CALC-SCNC: 8 MMOL/L (ref 10–20)
BASOPHILS # BLD AUTO: 0 K/MCL (ref 0–0.3)
BASOPHILS NFR BLD AUTO: 1 %
BUN SERPL-MCNC: 8 MG/DL (ref 6–20)
BUN/CREAT SERPL: 15 (ref 7–25)
CALCIUM SERPL-MCNC: 8.1 MG/DL (ref 8.4–10.2)
CHLORIDE SERPL-SCNC: 104 MMOL/L (ref 98–107)
CO2 SERPL-SCNC: 30 MMOL/L (ref 21–32)
CREAT SERPL-MCNC: 0.52 MG/DL (ref 0.51–0.95)
DIFFERENTIAL METHOD BLD: ABNORMAL
EOSINOPHIL # BLD AUTO: 0.2 K/MCL (ref 0.1–0.5)
EOSINOPHIL NFR SPEC: 5 %
ERYTHROCYTE [DISTWIDTH] IN BLOOD: 19.4 % (ref 11–15)
GLUCOSE SERPL-MCNC: 88 MG/DL (ref 65–99)
HCT VFR BLD CALC: 29.5 % (ref 36–46.5)
HGB BLD-MCNC: 9.4 G/DL (ref 12–15.5)
INR PPP: 3.2
LYMPHOCYTES # BLD MANUAL: 1.2 K/MCL (ref 1–4)
LYMPHOCYTES NFR BLD MANUAL: 31 %
MCH RBC QN AUTO: 25.8 PG (ref 26–34)
MCHC RBC AUTO-ENTMCNC: 31.9 G/DL (ref 32–36.5)
MCV RBC AUTO: 81 FL (ref 78–100)
MONOCYTES # BLD MANUAL: 0.4 K/MCL (ref 0.3–0.9)
MONOCYTES NFR BLD MANUAL: 11 %
NEUTROPHILS # BLD AUTO: 2.1 K/MCL (ref 1.8–7.7)
NEUTROPHILS NFR BLD AUTO: 52 %
PLATELET # BLD: 181 K/MCL (ref 140–450)
POTASSIUM SERPL-SCNC: 3.2 MMOL/L (ref 3.4–5.1)
POTASSIUM SERPL-SCNC: 3.3 MMOL/L (ref 3.4–5.1)
POTASSIUM SERPL-SCNC: 3.5 MMOL/L (ref 3.4–5.1)
PROTHROMBIN TIME: 34.5 SEC (ref 9.7–11.8)
RBC # BLD: 3.64 MIL/MCL (ref 4–5.2)
SODIUM SERPL-SCNC: 139 MMOL/L (ref 135–145)
WBC # BLD: 4 K/MCL (ref 4.2–11)

## 2017-07-29 PROCEDURE — 10004651 HB RX, NO CHARGE ITEM: Performed by: INTERNAL MEDICINE

## 2017-07-29 PROCEDURE — 10003445 HB TELEMETRY PER DAY

## 2017-07-29 PROCEDURE — 85025 COMPLETE CBC W/AUTO DIFF WBC: CPT

## 2017-07-29 PROCEDURE — 97530 THERAPEUTIC ACTIVITIES: CPT

## 2017-07-29 PROCEDURE — 10002803 HB RX 637: Performed by: NURSE PRACTITIONER

## 2017-07-29 PROCEDURE — 10002803 HB RX 637: Performed by: STUDENT IN AN ORGANIZED HEALTH CARE EDUCATION/TRAINING PROGRAM

## 2017-07-29 PROCEDURE — 80048 BASIC METABOLIC PNL TOTAL CA: CPT

## 2017-07-29 PROCEDURE — 10002803 HB RX 637: Performed by: INTERNAL MEDICINE

## 2017-07-29 PROCEDURE — 10002800 HB RX 250 W HCPCS: Performed by: STUDENT IN AN ORGANIZED HEALTH CARE EDUCATION/TRAINING PROGRAM

## 2017-07-29 PROCEDURE — 84132 ASSAY OF SERUM POTASSIUM: CPT

## 2017-07-29 PROCEDURE — 10000002 HB ROOM CHARGE MED SURG

## 2017-07-29 PROCEDURE — 99232 SBSQ HOSP IP/OBS MODERATE 35: CPT | Performed by: HOSPITALIST

## 2017-07-29 PROCEDURE — 97116 GAIT TRAINING THERAPY: CPT

## 2017-07-29 PROCEDURE — 10002800 HB RX 250 W HCPCS: Performed by: INTERNAL MEDICINE

## 2017-07-29 PROCEDURE — 99232 SBSQ HOSP IP/OBS MODERATE 35: CPT | Performed by: INTERNAL MEDICINE

## 2017-07-29 PROCEDURE — 85610 PROTHROMBIN TIME: CPT

## 2017-07-29 PROCEDURE — 10004173 HB COUNTER-THERAPY VISIT PT

## 2017-07-29 RX ORDER — WARFARIN SODIUM 7.5 MG/1
7.5 TABLET ORAL ONCE
Status: COMPLETED | OUTPATIENT
Start: 2017-07-29 | End: 2017-07-29

## 2017-07-29 RX ADMIN — RIOCIGUAT 1 MG: 1 TABLET, FILM COATED ORAL at 09:29

## 2017-07-29 RX ADMIN — ASPIRIN 81 MG: 81 TABLET ORAL at 09:29

## 2017-07-29 RX ADMIN — OLANZAPINE 0.62 MG: 2.5 TABLET, FILM COATED ORAL at 21:20

## 2017-07-29 RX ADMIN — Medication 10 ML: at 14:32

## 2017-07-29 RX ADMIN — OMEGA-3-ACID ETHYL ESTERS 2 G: 1 CAPSULE, LIQUID FILLED ORAL at 09:29

## 2017-07-29 RX ADMIN — CEFAZOLIN 2000 MG: 10 INJECTION, POWDER, FOR SOLUTION INTRAVENOUS at 14:10

## 2017-07-29 RX ADMIN — CEFAZOLIN 2000 MG: 10 INJECTION, POWDER, FOR SOLUTION INTRAVENOUS at 21:22

## 2017-07-29 RX ADMIN — POTASSIUM CHLORIDE 40 MEQ: 1500 TABLET, EXTENDED RELEASE ORAL at 21:26

## 2017-07-29 RX ADMIN — Medication 1 TABLET: at 09:29

## 2017-07-29 RX ADMIN — WARFARIN SODIUM 7.5 MG: 7.5 TABLET ORAL at 17:31

## 2017-07-29 RX ADMIN — CEFAZOLIN 2000 MG: 10 INJECTION, POWDER, FOR SOLUTION INTRAVENOUS at 06:11

## 2017-07-29 RX ADMIN — POTASSIUM CHLORIDE 40 MEQ: 1500 TABLET, EXTENDED RELEASE ORAL at 16:18

## 2017-07-29 RX ADMIN — Medication 1 TABLET: at 17:31

## 2017-07-29 RX ADMIN — RIOCIGUAT 1.5 MG: 1.5 TABLET, FILM COATED ORAL at 14:02

## 2017-07-29 RX ADMIN — PAROXETINE 20 MG: 20 TABLET, FILM COATED ORAL at 12:42

## 2017-07-29 RX ADMIN — POTASSIUM CHLORIDE 40 MEQ: 1500 TABLET, EXTENDED RELEASE ORAL at 09:30

## 2017-07-29 RX ADMIN — VITAMIN D, TAB 1000IU (100/BT) 1000 UNITS: 25 TAB at 09:29

## 2017-07-29 RX ADMIN — Medication 10 ML: at 06:21

## 2017-07-29 RX ADMIN — Medication 10 ML: at 21:22

## 2017-07-29 RX ADMIN — OXYCODONE HYDROCHLORIDE AND ACETAMINOPHEN 500 MG: 500 TABLET ORAL at 09:29

## 2017-07-29 RX ADMIN — EPOPROSTENOL 4 NG/KG/MIN: 1.5 INJECTION, POWDER, LYOPHILIZED, FOR SOLUTION INTRAVENOUS at 14:10

## 2017-07-29 RX ADMIN — RIOCIGUAT 1.5 MG: 1.5 TABLET, FILM COATED ORAL at 21:20

## 2017-07-29 RX ADMIN — Medication 150 MG: at 17:31

## 2017-07-29 RX ADMIN — CLONAZEPAM 0.5 MG: 0.5 TABLET ORAL at 21:20

## 2017-07-29 ASSESSMENT — PAIN SCALES - GENERAL
PAIN_LEVEL_WITH_ACTIVITY: 0
PAIN_LEVEL_AT_REST: 0
PAIN_LEVEL_AT_REST: 0
PAIN_LEVEL_WITH_ACTIVITY: 0
PAIN_LEVEL_WITH_ACTIVITY: 0
PAIN_LEVEL_AT_REST: 0
PAIN_LEVEL_WITH_ACTIVITY: 0
PAIN_LEVEL_AT_REST: 0

## 2017-07-30 LAB
APPEARANCE UR: CLEAR
BILIRUB UR QL STRIP: NEGATIVE
COLOR UR: YELLOW
GLUCOSE UR STRIP-MCNC: NEGATIVE MG/DL
HGB UR QL STRIP: NEGATIVE
INR PPP: 2.4
KETONES UR STRIP-MCNC: NEGATIVE MG/DL
LEUKOCYTE ESTERASE UR QL STRIP: NEGATIVE
NITRITE UR QL STRIP: NEGATIVE
PH UR STRIP: 8 UNITS (ref 5–7)
POTASSIUM SERPL-SCNC: 4.1 MMOL/L (ref 3.4–5.1)
PROT UR STRIP-MCNC: NEGATIVE MG/DL
PROTHROMBIN TIME: 25.4 SEC (ref 9.7–11.8)
SP GR UR STRIP: 1.01 (ref 1–1.03)
SPECIMEN SOURCE: ABNORMAL
UROBILINOGEN UR STRIP-MCNC: 0.2 MG/DL (ref 0–1)

## 2017-07-30 PROCEDURE — 10004651 HB RX, NO CHARGE ITEM: Performed by: INTERNAL MEDICINE

## 2017-07-30 PROCEDURE — 85610 PROTHROMBIN TIME: CPT

## 2017-07-30 PROCEDURE — 10002803 HB RX 637: Performed by: INTERNAL MEDICINE

## 2017-07-30 PROCEDURE — 97530 THERAPEUTIC ACTIVITIES: CPT

## 2017-07-30 PROCEDURE — 10002803 HB RX 637: Performed by: HOSPITALIST

## 2017-07-30 PROCEDURE — 99232 SBSQ HOSP IP/OBS MODERATE 35: CPT | Performed by: INTERNAL MEDICINE

## 2017-07-30 PROCEDURE — 84132 ASSAY OF SERUM POTASSIUM: CPT

## 2017-07-30 PROCEDURE — 10003445 HB TELEMETRY PER DAY

## 2017-07-30 PROCEDURE — 99232 SBSQ HOSP IP/OBS MODERATE 35: CPT | Performed by: HOSPITALIST

## 2017-07-30 PROCEDURE — 10002803 HB RX 637: Performed by: STUDENT IN AN ORGANIZED HEALTH CARE EDUCATION/TRAINING PROGRAM

## 2017-07-30 PROCEDURE — 97116 GAIT TRAINING THERAPY: CPT

## 2017-07-30 PROCEDURE — 10004173 HB COUNTER-THERAPY VISIT PT

## 2017-07-30 PROCEDURE — 10002800 HB RX 250 W HCPCS: Performed by: STUDENT IN AN ORGANIZED HEALTH CARE EDUCATION/TRAINING PROGRAM

## 2017-07-30 PROCEDURE — 10000002 HB ROOM CHARGE MED SURG

## 2017-07-30 PROCEDURE — 81003 URINALYSIS AUTO W/O SCOPE: CPT

## 2017-07-30 PROCEDURE — 36415 COLL VENOUS BLD VENIPUNCTURE: CPT

## 2017-07-30 RX ORDER — PANTOPRAZOLE SODIUM 40 MG/1
40 TABLET, DELAYED RELEASE ORAL NIGHTLY
Status: DISCONTINUED | OUTPATIENT
Start: 2017-07-30 | End: 2017-07-31

## 2017-07-30 RX ORDER — WARFARIN SODIUM 7.5 MG/1
7.5 TABLET ORAL ONCE
Status: COMPLETED | OUTPATIENT
Start: 2017-07-30 | End: 2017-07-30

## 2017-07-30 RX ADMIN — RIOCIGUAT 1.5 MG: 1.5 TABLET, FILM COATED ORAL at 08:19

## 2017-07-30 RX ADMIN — CEFAZOLIN 2000 MG: 10 INJECTION, POWDER, FOR SOLUTION INTRAVENOUS at 21:01

## 2017-07-30 RX ADMIN — CLONAZEPAM 0.5 MG: 0.5 TABLET ORAL at 21:01

## 2017-07-30 RX ADMIN — PAROXETINE 20 MG: 20 TABLET, FILM COATED ORAL at 12:28

## 2017-07-30 RX ADMIN — Medication 1 TABLET: at 08:18

## 2017-07-30 RX ADMIN — OMEGA-3-ACID ETHYL ESTERS 2 G: 1 CAPSULE, LIQUID FILLED ORAL at 08:18

## 2017-07-30 RX ADMIN — RIOCIGUAT 1.5 MG: 1.5 TABLET, FILM COATED ORAL at 14:04

## 2017-07-30 RX ADMIN — CEFAZOLIN 2000 MG: 10 INJECTION, POWDER, FOR SOLUTION INTRAVENOUS at 05:12

## 2017-07-30 RX ADMIN — WARFARIN SODIUM 7.5 MG: 7.5 TABLET ORAL at 18:49

## 2017-07-30 RX ADMIN — OLANZAPINE 0.62 MG: 2.5 TABLET, FILM COATED ORAL at 21:01

## 2017-07-30 RX ADMIN — Medication 10 ML: at 06:00

## 2017-07-30 RX ADMIN — Medication 10 ML: at 21:02

## 2017-07-30 RX ADMIN — CEFAZOLIN 2000 MG: 10 INJECTION, POWDER, FOR SOLUTION INTRAVENOUS at 14:04

## 2017-07-30 RX ADMIN — Medication 150 MG: at 18:49

## 2017-07-30 RX ADMIN — VITAMIN D, TAB 1000IU (100/BT) 1000 UNITS: 25 TAB at 08:18

## 2017-07-30 RX ADMIN — Medication 1 TABLET: at 18:49

## 2017-07-30 RX ADMIN — ASPIRIN 81 MG: 81 TABLET ORAL at 08:18

## 2017-07-30 RX ADMIN — Medication 10 ML: at 14:04

## 2017-07-30 RX ADMIN — OXYCODONE HYDROCHLORIDE AND ACETAMINOPHEN 500 MG: 500 TABLET ORAL at 08:18

## 2017-07-30 RX ADMIN — RIOCIGUAT 1.5 MG: 1.5 TABLET, FILM COATED ORAL at 21:02

## 2017-07-30 ASSESSMENT — PAIN SCALES - GENERAL
PAIN_LEVEL_AT_REST: 0
PAIN_LEVEL_WITH_ACTIVITY: 0
PAIN_LEVEL_AT_REST: 0
PAIN_LEVEL_WITH_ACTIVITY: 0
PAIN_LEVEL_AT_REST: 0
PAIN_LEVEL_WITH_ACTIVITY: 0

## 2017-07-31 ENCOUNTER — APPOINTMENT (OUTPATIENT)
Dept: PULMONOLOGY | Age: 67
DRG: 314 | End: 2017-07-31

## 2017-07-31 LAB
ANION GAP SERPL CALC-SCNC: 13 MMOL/L (ref 10–20)
ANNOTATION COMMENT IMP: NORMAL
ANNOTATION COMMENT IMP: NORMAL
BACTERIA BLD CULT: NORMAL
BACTERIA BLD CULT: NORMAL
BASOPHILS # BLD AUTO: 0 K/MCL (ref 0–0.3)
BASOPHILS NFR BLD AUTO: 0 %
BUN SERPL-MCNC: 3 MG/DL (ref 6–20)
BUN/CREAT SERPL: 7 (ref 7–25)
CALCIUM SERPL-MCNC: 6.9 MG/DL (ref 8.4–10.2)
CHLORIDE SERPL-SCNC: 110 MMOL/L (ref 98–107)
CO2 SERPL-SCNC: 26 MMOL/L (ref 21–32)
COHGB MFR BLD: 0.8 %
COHGB MFR BLD: 1 %
CREAT SERPL-MCNC: 0.45 MG/DL (ref 0.51–0.95)
DIFFERENTIAL METHOD BLD: ABNORMAL
EOSINOPHIL # BLD AUTO: 0.3 K/MCL (ref 0.1–0.5)
EOSINOPHIL NFR SPEC: 5 %
ERYTHROCYTE [DISTWIDTH] IN BLOOD: 19.6 % (ref 11–15)
GLUCOSE SERPL-MCNC: 98 MG/DL (ref 65–99)
HCO3 BLDA-SCNC: 28 MMOL/L (ref 22–28)
HCO3 BLDA-SCNC: 28 MMOL/L (ref 22–28)
HCT VFR BLD CALC: 26.2 % (ref 36–46.5)
HCT VFR BLD CALC: 30.1 % (ref 36–46.5)
HCT VFR BLD CALC: 30.2 % (ref 36–46.5)
HGB BLD-MCNC: 8.2 G/DL (ref 12–15.5)
HGB BLD-MCNC: 9.8 G/DL (ref 12–15.5)
HGB BLD-MCNC: 9.8 G/DL (ref 12–15.5)
INR PPP: 2.6
LYMPHOCYTES # BLD MANUAL: 1.3 K/MCL (ref 1–4)
LYMPHOCYTES NFR BLD MANUAL: 23 %
MCH RBC QN AUTO: 25.5 PG (ref 26–34)
MCHC RBC AUTO-ENTMCNC: 31.3 G/DL (ref 32–36.5)
MCV RBC AUTO: 81.6 FL (ref 78–100)
METHGB MFR BLD: 0.7 %
METHGB MFR BLD: 0.8 %
MONOCYTES # BLD MANUAL: 0.6 K/MCL (ref 0.3–0.9)
MONOCYTES NFR BLD MANUAL: 10 %
NEUTROPHILS # BLD AUTO: 3.4 K/MCL (ref 1.8–7.7)
NEUTROPHILS NFR BLD AUTO: 62 %
PCO2 BLDA: 34 MM HG (ref 32–45)
PCO2 BLDA: 35 MM HG (ref 32–45)
PH BLDA: 7.51 UNITS (ref 7.35–7.45)
PH BLDA: 7.51 UNITS (ref 7.35–7.45)
PLATELET # BLD: 284 K/MCL (ref 140–450)
PO2 BLDA: 69 MM HG (ref 83–108)
PO2 BLDA: 69 MM HG (ref 83–108)
POTASSIUM SERPL-SCNC: 3.2 MMOL/L (ref 3.4–5.1)
POTASSIUM SERPL-SCNC: 3.6 MMOL/L (ref 3.4–5.1)
PROTHROMBIN TIME: 28.2 SEC (ref 9.7–11.8)
RBC # BLD: 3.21 MIL/MCL (ref 4–5.2)
REPORT STATUS (RPT): NORMAL
REPORT STATUS (RPT): NORMAL
SAO2 % BLDA: 96 %
SAO2 % BLDA: 96 %
SODIUM SERPL-SCNC: 146 MMOL/L (ref 135–145)
SPECIMEN SOURCE: NORMAL
SPECIMEN SOURCE: NORMAL
WBC # BLD: 5.5 K/MCL (ref 4.2–11)

## 2017-07-31 PROCEDURE — 97535 SELF CARE MNGMENT TRAINING: CPT

## 2017-07-31 PROCEDURE — 10002800 HB RX 250 W HCPCS: Performed by: INTERNAL MEDICINE

## 2017-07-31 PROCEDURE — 85018 HEMOGLOBIN: CPT

## 2017-07-31 PROCEDURE — 99232 SBSQ HOSP IP/OBS MODERATE 35: CPT | Performed by: NURSE PRACTITIONER

## 2017-07-31 PROCEDURE — 10004172 HB COUNTER-THERAPY VISIT OT

## 2017-07-31 PROCEDURE — 85610 PROTHROMBIN TIME: CPT

## 2017-07-31 PROCEDURE — 82375 ASSAY CARBOXYHB QUANT: CPT

## 2017-07-31 PROCEDURE — 10004281 HB COUNTER-STAFF TIME PER 15 MIN

## 2017-07-31 PROCEDURE — 99233 SBSQ HOSP IP/OBS HIGH 50: CPT | Performed by: NURSE PRACTITIONER

## 2017-07-31 PROCEDURE — 99090 HB PULSE OXIMETRY TRENDING: CPT

## 2017-07-31 PROCEDURE — 94453 HAST W/SUPPL OXYGEN TITRJ: CPT

## 2017-07-31 PROCEDURE — 10004651 HB RX, NO CHARGE ITEM: Performed by: INTERNAL MEDICINE

## 2017-07-31 PROCEDURE — 10003445 HB TELEMETRY PER DAY

## 2017-07-31 PROCEDURE — 10002803 HB RX 637: Performed by: STUDENT IN AN ORGANIZED HEALTH CARE EDUCATION/TRAINING PROGRAM

## 2017-07-31 PROCEDURE — 10000002 HB ROOM CHARGE MED SURG

## 2017-07-31 PROCEDURE — 10002803 HB RX 637: Performed by: INTERNAL MEDICINE

## 2017-07-31 PROCEDURE — 82803 BLOOD GASES ANY COMBINATION: CPT

## 2017-07-31 PROCEDURE — 92610 EVALUATE SWALLOWING FUNCTION: CPT

## 2017-07-31 PROCEDURE — 99232 SBSQ HOSP IP/OBS MODERATE 35: CPT | Performed by: HOSPITALIST

## 2017-07-31 PROCEDURE — 83050 HGB METHEMOGLOBIN QUAN: CPT

## 2017-07-31 PROCEDURE — 97530 THERAPEUTIC ACTIVITIES: CPT

## 2017-07-31 PROCEDURE — 36600 WITHDRAWAL OF ARTERIAL BLOOD: CPT

## 2017-07-31 PROCEDURE — 84132 ASSAY OF SERUM POTASSIUM: CPT

## 2017-07-31 PROCEDURE — 10004325 HB COUNTER ASSESSMENT EA 15 MIN: Performed by: DIETITIAN, REGISTERED

## 2017-07-31 PROCEDURE — 85025 COMPLETE CBC W/AUTO DIFF WBC: CPT

## 2017-07-31 PROCEDURE — 10004174 HB COUNTER-THERAPY VISIT SP

## 2017-07-31 PROCEDURE — 10002803 HB RX 637: Performed by: NURSE PRACTITIONER

## 2017-07-31 PROCEDURE — 85014 HEMATOCRIT: CPT

## 2017-07-31 PROCEDURE — 99254 IP/OBS CNSLTJ NEW/EST MOD 60: CPT | Performed by: INTERNAL MEDICINE

## 2017-07-31 PROCEDURE — 10002800 HB RX 250 W HCPCS: Performed by: STUDENT IN AN ORGANIZED HEALTH CARE EDUCATION/TRAINING PROGRAM

## 2017-07-31 PROCEDURE — 80048 BASIC METABOLIC PNL TOTAL CA: CPT

## 2017-07-31 PROCEDURE — 94620 HB PULMONARY STRESS TEST-SIMPLE: CPT

## 2017-07-31 RX ORDER — WARFARIN SODIUM 7.5 MG/1
7.5 TABLET ORAL ONCE
Status: COMPLETED | OUTPATIENT
Start: 2017-07-31 | End: 2017-07-31

## 2017-07-31 RX ADMIN — Medication 10 ML: at 21:03

## 2017-07-31 RX ADMIN — CEFAZOLIN 2000 MG: 10 INJECTION, POWDER, FOR SOLUTION INTRAVENOUS at 14:22

## 2017-07-31 RX ADMIN — OLANZAPINE 0.62 MG: 2.5 TABLET, FILM COATED ORAL at 21:03

## 2017-07-31 RX ADMIN — WARFARIN SODIUM 7.5 MG: 7.5 TABLET ORAL at 17:58

## 2017-07-31 RX ADMIN — CEFAZOLIN 2000 MG: 10 INJECTION, POWDER, FOR SOLUTION INTRAVENOUS at 21:03

## 2017-07-31 RX ADMIN — Medication 10 ML: at 05:22

## 2017-07-31 RX ADMIN — CEFAZOLIN 2000 MG: 10 INJECTION, POWDER, FOR SOLUTION INTRAVENOUS at 05:22

## 2017-07-31 RX ADMIN — PAROXETINE 20 MG: 20 TABLET, FILM COATED ORAL at 14:27

## 2017-07-31 RX ADMIN — VITAMIN D, TAB 1000IU (100/BT) 1000 UNITS: 25 TAB at 10:39

## 2017-07-31 RX ADMIN — RIOCIGUAT 2 MG: 2 TABLET, FILM COATED ORAL at 11:38

## 2017-07-31 RX ADMIN — Medication 1 TABLET: at 10:40

## 2017-07-31 RX ADMIN — POTASSIUM CHLORIDE 20 MEQ: 1500 TABLET, EXTENDED RELEASE ORAL at 18:24

## 2017-07-31 RX ADMIN — OMEGA-3-ACID ETHYL ESTERS 2 G: 1 CAPSULE, LIQUID FILLED ORAL at 10:38

## 2017-07-31 RX ADMIN — CLONAZEPAM 0.5 MG: 0.5 TABLET ORAL at 21:03

## 2017-07-31 RX ADMIN — ASPIRIN 81 MG: 81 TABLET ORAL at 10:38

## 2017-07-31 RX ADMIN — OXYCODONE HYDROCHLORIDE AND ACETAMINOPHEN 500 MG: 500 TABLET ORAL at 10:38

## 2017-07-31 RX ADMIN — Medication 150 MG: at 17:58

## 2017-07-31 RX ADMIN — RIOCIGUAT 2 MG: 2 TABLET, FILM COATED ORAL at 22:32

## 2017-07-31 RX ADMIN — Medication 10 ML: at 14:27

## 2017-07-31 RX ADMIN — POTASSIUM CHLORIDE 40 MEQ: 1500 TABLET, EXTENDED RELEASE ORAL at 06:52

## 2017-07-31 RX ADMIN — RIOCIGUAT 2 MG: 2 TABLET, FILM COATED ORAL at 15:20

## 2017-07-31 RX ADMIN — Medication 2 ML: at 10:41

## 2017-07-31 RX ADMIN — Medication 1 TABLET: at 17:57

## 2017-07-31 ASSESSMENT — PAIN SCALES - GENERAL
PAIN_LEVEL_AT_REST: 0
PAIN_LEVEL_WITH_ACTIVITY: 0
PAIN_LEVEL_WITH_ACTIVITY: 0
PAIN_LEVEL_AT_REST: 0

## 2017-08-01 ENCOUNTER — APPOINTMENT (OUTPATIENT)
Dept: GENERAL RADIOLOGY | Age: 67
DRG: 314 | End: 2017-08-01
Attending: NURSE PRACTITIONER

## 2017-08-01 VITALS
SYSTOLIC BLOOD PRESSURE: 105 MMHG | BODY MASS INDEX: 27.12 KG/M2 | OXYGEN SATURATION: 93 % | TEMPERATURE: 98.5 F | HEIGHT: 63 IN | DIASTOLIC BLOOD PRESSURE: 63 MMHG | WEIGHT: 153.1 LBS | HEART RATE: 89 BPM | RESPIRATION RATE: 18 BRPM

## 2017-08-01 LAB
BASOPHILS # BLD AUTO: 0 K/MCL (ref 0–0.3)
BASOPHILS NFR BLD AUTO: 0 %
DIFFERENTIAL METHOD BLD: ABNORMAL
EOSINOPHIL # BLD AUTO: 0.3 K/MCL (ref 0.1–0.5)
EOSINOPHIL NFR SPEC: 6 %
ERYTHROCYTE [DISTWIDTH] IN BLOOD: 19.9 % (ref 11–15)
FERRITIN SERPL-MCNC: 75 NG/ML (ref 8–252)
HCT VFR BLD CALC: 30.1 % (ref 36–46.5)
HGB BLD-MCNC: 9.4 G/DL (ref 12–15.5)
INR PPP: 2.2
IRON SATN MFR SERPL: 9 % (ref 15–45)
IRON SERPL-MCNC: 27 MCG/DL (ref 50–170)
LYMPHOCYTES # BLD MANUAL: 1.6 K/MCL (ref 1–4)
LYMPHOCYTES NFR BLD MANUAL: 28 %
MCH RBC QN AUTO: 25.6 PG (ref 26–34)
MCHC RBC AUTO-ENTMCNC: 31.2 G/DL (ref 32–36.5)
MCV RBC AUTO: 82 FL (ref 78–100)
MONOCYTES # BLD MANUAL: 0.6 K/MCL (ref 0.3–0.9)
MONOCYTES NFR BLD MANUAL: 10 %
NEUTROPHILS # BLD AUTO: 3.2 K/MCL (ref 1.8–7.7)
NEUTROPHILS NFR BLD AUTO: 56 %
PLATELET # BLD: 417 K/MCL (ref 140–450)
POTASSIUM SERPL-SCNC: 4 MMOL/L (ref 3.4–5.1)
PROTHROMBIN TIME: 24.2 SEC (ref 9.7–11.8)
RBC # BLD: 3.67 MIL/MCL (ref 4–5.2)
TIBC SERPL-MCNC: 303 MCG/DL (ref 250–450)
WBC # BLD: 5.7 K/MCL (ref 4.2–11)

## 2017-08-01 PROCEDURE — 10004651 HB RX, NO CHARGE ITEM: Performed by: INTERNAL MEDICINE

## 2017-08-01 PROCEDURE — 82728 ASSAY OF FERRITIN: CPT

## 2017-08-01 PROCEDURE — 97116 GAIT TRAINING THERAPY: CPT

## 2017-08-01 PROCEDURE — 4A03XR1 MEASUREMENT OF ARTERIAL SATURATION, PERIPHERAL, EXTERNAL APPROACH: ICD-10-PCS | Performed by: INTERNAL MEDICINE

## 2017-08-01 PROCEDURE — 74220 X-RAY XM ESOPHAGUS 1CNTRST: CPT

## 2017-08-01 PROCEDURE — 85025 COMPLETE CBC W/AUTO DIFF WBC: CPT

## 2017-08-01 PROCEDURE — 74220 X-RAY XM ESOPHAGUS 1CNTRST: CPT | Performed by: RADIOLOGY

## 2017-08-01 PROCEDURE — 10002800 HB RX 250 W HCPCS: Performed by: INTERNAL MEDICINE

## 2017-08-01 PROCEDURE — 10004173 HB COUNTER-THERAPY VISIT PT

## 2017-08-01 PROCEDURE — 10002803 HB RX 637: Performed by: INTERNAL MEDICINE

## 2017-08-01 PROCEDURE — 10002805 HB CONTRAST AGENT: Performed by: RADIOLOGY

## 2017-08-01 PROCEDURE — 10002803 HB RX 637: Performed by: STUDENT IN AN ORGANIZED HEALTH CARE EDUCATION/TRAINING PROGRAM

## 2017-08-01 PROCEDURE — 99232 SBSQ HOSP IP/OBS MODERATE 35: CPT | Performed by: NURSE PRACTITIONER

## 2017-08-01 PROCEDURE — 84132 ASSAY OF SERUM POTASSIUM: CPT

## 2017-08-01 PROCEDURE — 99239 HOSP IP/OBS DSCHRG MGMT >30: CPT | Performed by: HOSPITALIST

## 2017-08-01 PROCEDURE — 10002803 HB RX 637: Performed by: NURSE PRACTITIONER

## 2017-08-01 PROCEDURE — 85610 PROTHROMBIN TIME: CPT

## 2017-08-01 PROCEDURE — 83540 ASSAY OF IRON: CPT

## 2017-08-01 PROCEDURE — 94453 HAST W/SUPPL OXYGEN TITRJ: CPT | Performed by: INTERNAL MEDICINE

## 2017-08-01 PROCEDURE — 10002800 HB RX 250 W HCPCS: Performed by: HOSPITALIST

## 2017-08-01 RX ORDER — WARFARIN SODIUM 10 MG/1
10-15 TABLET ORAL SEE ADMIN INSTRUCTIONS
Qty: 30 TABLET | Refills: 0 | Status: SHIPPED
Start: 2017-08-01 | End: 2017-08-01

## 2017-08-01 RX ORDER — L. ACIDOPHILUS/PECTIN, CITRUS 25MM-100MG
1 TABLET ORAL 2 TIMES DAILY
Qty: 30 TABLET | Refills: 0 | Status: SHIPPED | OUTPATIENT
Start: 2017-08-01 | End: 2017-11-20 | Stop reason: ALTCHOICE

## 2017-08-01 RX ORDER — NICOTINE POLACRILEX 4 MG/1
20 GUM, CHEWING ORAL DAILY
Qty: 90 TABLET | Refills: 0 | Status: SHIPPED | OUTPATIENT
Start: 2017-08-01 | End: 2017-08-01 | Stop reason: HOSPADM

## 2017-08-01 RX ORDER — WARFARIN SODIUM 10 MG/1
10-15 TABLET ORAL SEE ADMIN INSTRUCTIONS
Qty: 30 TABLET | Refills: 0 | Status: SHIPPED
Start: 2017-08-01 | End: 2017-08-23 | Stop reason: CLARIF

## 2017-08-01 RX ORDER — L. ACIDOPHILUS/PECTIN, CITRUS 25MM-100MG
1 TABLET ORAL 2 TIMES DAILY
Qty: 30 TABLET | Refills: 0 | Status: SHIPPED | OUTPATIENT
Start: 2017-08-01 | End: 2017-08-01

## 2017-08-01 RX ORDER — OMEPRAZOLE 40 MG/1
40 CAPSULE, DELAYED RELEASE ORAL DAILY
Qty: 90 CAPSULE | Refills: 0 | Status: SHIPPED | OUTPATIENT
Start: 2017-08-01 | End: 2017-08-10 | Stop reason: ALTCHOICE

## 2017-08-01 RX ADMIN — PAROXETINE 20 MG: 20 TABLET, FILM COATED ORAL at 12:56

## 2017-08-01 RX ADMIN — POTASSIUM CHLORIDE 20 MEQ: 1500 TABLET, EXTENDED RELEASE ORAL at 10:29

## 2017-08-01 RX ADMIN — RIOCIGUAT 2 MG: 2 TABLET, FILM COATED ORAL at 14:50

## 2017-08-01 RX ADMIN — BARIUM SULFATE 100 ML: 0.6 SUSPENSION ORAL at 09:15

## 2017-08-01 RX ADMIN — OMEGA-3-ACID ETHYL ESTERS 2 G: 1 CAPSULE, LIQUID FILLED ORAL at 10:28

## 2017-08-01 RX ADMIN — BARIUM SULFATE 50 ML: 980 POWDER, FOR SUSPENSION ORAL at 09:15

## 2017-08-01 RX ADMIN — ASPIRIN 81 MG: 81 TABLET ORAL at 10:28

## 2017-08-01 RX ADMIN — Medication 1 TABLET: at 10:28

## 2017-08-01 RX ADMIN — IRON SUCROSE 200 MG: 20 INJECTION, SOLUTION INTRAVENOUS at 15:55

## 2017-08-01 RX ADMIN — CEFAZOLIN 2000 MG: 10 INJECTION, POWDER, FOR SOLUTION INTRAVENOUS at 06:07

## 2017-08-01 RX ADMIN — CEFAZOLIN 2000 MG: 10 INJECTION, POWDER, FOR SOLUTION INTRAVENOUS at 14:09

## 2017-08-01 RX ADMIN — VITAMIN D, TAB 1000IU (100/BT) 1000 UNITS: 25 TAB at 10:28

## 2017-08-01 RX ADMIN — Medication 10 ML: at 05:55

## 2017-08-01 RX ADMIN — RIOCIGUAT 2 MG: 2 TABLET, FILM COATED ORAL at 10:29

## 2017-08-01 RX ADMIN — Medication 10 ML: at 11:00

## 2017-08-01 RX ADMIN — OXYCODONE HYDROCHLORIDE AND ACETAMINOPHEN 500 MG: 500 TABLET ORAL at 10:29

## 2017-08-01 RX ADMIN — Medication 10 ML: at 14:17

## 2017-08-01 ASSESSMENT — PAIN SCALES - GENERAL
PAIN_LEVEL_AT_REST: 0
PAIN_LEVEL_WITH_ACTIVITY: 0
PAIN_LEVEL_AT_REST: 0
PAIN_LEVEL_AT_REST: 0

## 2017-08-02 ENCOUNTER — TELEPHONE (OUTPATIENT)
Dept: EDUCATION SERVICES | Age: 67
End: 2017-08-02

## 2017-08-02 ENCOUNTER — TELEPHONE (OUTPATIENT)
Dept: FAMILY MEDICINE | Age: 67
End: 2017-08-02

## 2017-08-02 ENCOUNTER — TELEPHONE (OUTPATIENT)
Dept: TRANSPLANT | Age: 67
End: 2017-08-02

## 2017-08-03 ENCOUNTER — TELEPHONE (OUTPATIENT)
Dept: EDUCATION SERVICES | Age: 67
End: 2017-08-03

## 2017-08-04 ENCOUNTER — LAB SERVICES (OUTPATIENT)
Dept: LAB | Age: 67
End: 2017-08-04

## 2017-08-04 ENCOUNTER — TELEPHONE (OUTPATIENT)
Dept: PULMONOLOGY | Age: 67
End: 2017-08-04

## 2017-08-04 ENCOUNTER — TELEPHONE (OUTPATIENT)
Dept: TRANSPLANT | Age: 67
End: 2017-08-04

## 2017-08-04 DIAGNOSIS — D69.9 ANTICOAGULANT DISORDER (CMD): ICD-10-CM

## 2017-08-04 DIAGNOSIS — I27.20 PULMONARY HTN (CMD): Primary | ICD-10-CM

## 2017-08-04 DIAGNOSIS — I27.24 CTEPH (CHRONIC THROMBOEMBOLIC PULMONARY HYPERTENSION) (CMD): ICD-10-CM

## 2017-08-04 DIAGNOSIS — R06.02 SOB (SHORTNESS OF BREATH): ICD-10-CM

## 2017-08-04 DIAGNOSIS — B99.9 INFECTION: ICD-10-CM

## 2017-08-04 LAB
INR PPP: 2
PROTHROMBIN TIME: 21.7 SEC (ref 9.7–11.8)

## 2017-08-04 PROCEDURE — 85610 PROTHROMBIN TIME: CPT | Performed by: INTERNAL MEDICINE

## 2017-08-04 PROCEDURE — 36415 COLL VENOUS BLD VENIPUNCTURE: CPT | Performed by: INTERNAL MEDICINE

## 2017-08-07 ENCOUNTER — LAB SERVICES (OUTPATIENT)
Dept: LAB | Age: 67
End: 2017-08-07

## 2017-08-07 ENCOUNTER — TELEPHONE (OUTPATIENT)
Dept: PULMONOLOGY | Age: 67
End: 2017-08-07

## 2017-08-07 DIAGNOSIS — R06.02 SOB (SHORTNESS OF BREATH): ICD-10-CM

## 2017-08-07 DIAGNOSIS — I27.20 PULMONARY HTN (CMD): ICD-10-CM

## 2017-08-07 DIAGNOSIS — I26.09 OTHER ACUTE PULMONARY EMBOLISM WITH ACUTE COR PULMONALE (CMD): ICD-10-CM

## 2017-08-07 DIAGNOSIS — B99.9 INFECTION: ICD-10-CM

## 2017-08-07 DIAGNOSIS — I27.24 CTEPH (CHRONIC THROMBOEMBOLIC PULMONARY HYPERTENSION) (CMD): ICD-10-CM

## 2017-08-07 DIAGNOSIS — D63.8 ANEMIA OF CHRONIC DISEASE: ICD-10-CM

## 2017-08-07 LAB
ALBUMIN SERPL-MCNC: 2.9 G/DL (ref 3.6–5.1)
ALBUMIN/GLOB SERPL: 0.6 {RATIO} (ref 1–2.4)
ALP SERPL-CCNC: 127 UNITS/L (ref 45–117)
ALT SERPL-CCNC: 12 UNITS/L
ANION GAP SERPL CALC-SCNC: 13 MMOL/L (ref 10–20)
AST SERPL-CCNC: 26 UNITS/L
BASOPHILS # BLD AUTO: 0 K/MCL (ref 0–0.3)
BASOPHILS NFR BLD AUTO: 1 %
BILIRUB SERPL-MCNC: 0.5 MG/DL (ref 0.2–1)
BNP SERPL-MCNC: 18 PG/ML
BUN SERPL-MCNC: 11 MG/DL (ref 6–20)
BUN/CREAT SERPL: 19 (ref 7–25)
CALCIUM SERPL-MCNC: 9.1 MG/DL (ref 8.4–10.2)
CHLORIDE SERPL-SCNC: 105 MMOL/L (ref 98–107)
CO2 SERPL-SCNC: 28 MMOL/L (ref 21–32)
CREAT SERPL-MCNC: 0.59 MG/DL (ref 0.51–0.95)
DIFFERENTIAL METHOD BLD: ABNORMAL
EOSINOPHIL # BLD AUTO: 0.2 K/MCL (ref 0.1–0.5)
EOSINOPHIL NFR SPEC: 4 %
ERYTHROCYTE [DISTWIDTH] IN BLOOD: 20.2 % (ref 11–15)
FASTING STATUS PATIENT QL REPORTED: ABNORMAL HRS
GLOBULIN SER-MCNC: 4.7 G/DL (ref 2–4)
GLUCOSE SERPL-MCNC: 95 MG/DL (ref 65–99)
HCT VFR BLD CALC: 34.4 % (ref 36–46.5)
HGB BLD-MCNC: 10.6 G/DL (ref 12–15.5)
LYMPHOCYTES # BLD MANUAL: 1.6 K/MCL (ref 1–4)
LYMPHOCYTES NFR BLD MANUAL: 35 %
MCH RBC QN AUTO: 26.1 PG (ref 26–34)
MCHC RBC AUTO-ENTMCNC: 30.8 G/DL (ref 32–36.5)
MCV RBC AUTO: 84.7 FL (ref 78–100)
MONOCYTES # BLD MANUAL: 0.6 K/MCL (ref 0.3–0.9)
MONOCYTES NFR BLD MANUAL: 12 %
NEUTROPHILS # BLD AUTO: 2.2 K/MCL (ref 1.8–7.7)
NEUTROPHILS NFR BLD AUTO: 48 %
PLATELET # BLD: 494 K/MCL (ref 140–450)
POTASSIUM SERPL-SCNC: 3.8 MMOL/L (ref 3.4–5.1)
PROT SERPL-MCNC: 7.6 G/DL (ref 6.4–8.2)
RBC # BLD: 4.06 MIL/MCL (ref 4–5.2)
SODIUM SERPL-SCNC: 142 MMOL/L (ref 135–145)
WBC # BLD: 4.6 K/MCL (ref 4.2–11)

## 2017-08-07 PROCEDURE — 85025 COMPLETE CBC W/AUTO DIFF WBC: CPT | Performed by: INTERNAL MEDICINE

## 2017-08-07 PROCEDURE — 36415 COLL VENOUS BLD VENIPUNCTURE: CPT | Performed by: INTERNAL MEDICINE

## 2017-08-07 PROCEDURE — 83880 ASSAY OF NATRIURETIC PEPTIDE: CPT | Performed by: INTERNAL MEDICINE

## 2017-08-07 PROCEDURE — 80053 COMPREHEN METABOLIC PANEL: CPT | Performed by: INTERNAL MEDICINE

## 2017-08-08 ENCOUNTER — HOSPITAL ENCOUNTER (OUTPATIENT)
Dept: LAB | Age: 67
Discharge: HOME | End: 2017-08-08
Attending: INTERNAL MEDICINE

## 2017-08-08 ENCOUNTER — OFFICE VISIT (OUTPATIENT)
Dept: TRANSPLANT | Age: 67
End: 2017-08-08
Attending: INTERNAL MEDICINE

## 2017-08-08 VITALS
OXYGEN SATURATION: 94 % | HEIGHT: 63 IN | SYSTOLIC BLOOD PRESSURE: 103 MMHG | DIASTOLIC BLOOD PRESSURE: 61 MMHG | BODY MASS INDEX: 26.58 KG/M2 | WEIGHT: 150 LBS | HEART RATE: 86 BPM

## 2017-08-08 DIAGNOSIS — I27.20 PULMONARY HTN (CMD): ICD-10-CM

## 2017-08-08 DIAGNOSIS — R13.10 DYSPHAGIA, UNSPECIFIED TYPE: ICD-10-CM

## 2017-08-08 DIAGNOSIS — A41.9: ICD-10-CM

## 2017-08-08 DIAGNOSIS — A41.9: Primary | ICD-10-CM

## 2017-08-08 DIAGNOSIS — D69.9 ANTICOAGULANT DISORDER (CMD): ICD-10-CM

## 2017-08-08 LAB
INR PPP: 3.1
PROTHROMBIN TIME: 33.8 SEC (ref 9.7–11.8)

## 2017-08-08 PROCEDURE — 87040 BLOOD CULTURE FOR BACTERIA: CPT

## 2017-08-08 PROCEDURE — 36415 COLL VENOUS BLD VENIPUNCTURE: CPT

## 2017-08-08 PROCEDURE — 85610 PROTHROMBIN TIME: CPT

## 2017-08-08 PROCEDURE — 99496 TRANSJ CARE MGMT HIGH F2F 7D: CPT | Performed by: INTERNAL MEDICINE

## 2017-08-10 ENCOUNTER — OFFICE VISIT (OUTPATIENT)
Dept: OTOLARYNGOLOGY | Age: 67
End: 2017-08-10

## 2017-08-10 VITALS
SYSTOLIC BLOOD PRESSURE: 126 MMHG | OXYGEN SATURATION: 91 % | HEART RATE: 97 BPM | HEIGHT: 63 IN | WEIGHT: 150 LBS | BODY MASS INDEX: 26.58 KG/M2 | DIASTOLIC BLOOD PRESSURE: 62 MMHG

## 2017-08-10 DIAGNOSIS — Z87.19 HISTORY OF GASTROESOPHAGEAL REFLUX (GERD): ICD-10-CM

## 2017-08-10 DIAGNOSIS — R13.13 PHARYNGEAL DYSPHAGIA: Primary | ICD-10-CM

## 2017-08-10 DIAGNOSIS — K21.9 LPRD (LARYNGOPHARYNGEAL REFLUX DISEASE): ICD-10-CM

## 2017-08-10 PROCEDURE — 31575 DIAGNOSTIC LARYNGOSCOPY: CPT | Performed by: OTOLARYNGOLOGY

## 2017-08-10 PROCEDURE — 99244 OFF/OP CNSLTJ NEW/EST MOD 40: CPT | Performed by: OTOLARYNGOLOGY

## 2017-08-10 RX ORDER — OMEPRAZOLE 20 MG/1
20 CAPSULE, DELAYED RELEASE ORAL
Qty: 60 CAPSULE | Refills: 11 | Status: SHIPPED | OUTPATIENT
Start: 2017-08-10 | End: 2017-08-10 | Stop reason: SDUPTHER

## 2017-08-10 RX ORDER — OMEPRAZOLE 20 MG/1
CAPSULE, DELAYED RELEASE ORAL
Qty: 180 CAPSULE | Refills: 4 | Status: SHIPPED | OUTPATIENT
Start: 2017-08-10

## 2017-08-11 ENCOUNTER — LAB SERVICES (OUTPATIENT)
Dept: LAB | Age: 67
End: 2017-08-11

## 2017-08-11 ENCOUNTER — TELEPHONE (OUTPATIENT)
Dept: TRANSPLANT | Age: 67
End: 2017-08-11

## 2017-08-11 DIAGNOSIS — D69.9 ANTICOAGULANT DISORDER (CMD): ICD-10-CM

## 2017-08-11 LAB
INR PPP: 3.3
PROTHROMBIN TIME: 36.9 SEC (ref 9.7–11.8)

## 2017-08-11 PROCEDURE — 85610 PROTHROMBIN TIME: CPT | Performed by: INTERNAL MEDICINE

## 2017-08-11 PROCEDURE — 36415 COLL VENOUS BLD VENIPUNCTURE: CPT | Performed by: INTERNAL MEDICINE

## 2017-08-13 LAB
BACTERIA BLD CULT: NORMAL
REPORT STATUS (RPT): NORMAL
SPECIMEN SOURCE: NORMAL

## 2017-08-14 ENCOUNTER — TELEPHONE (OUTPATIENT)
Dept: HEMATOLOGY/ONCOLOGY | Age: 67
End: 2017-08-14

## 2017-08-16 ENCOUNTER — TELEPHONE (OUTPATIENT)
Dept: TRANSPLANT | Age: 67
End: 2017-08-16

## 2017-08-16 ENCOUNTER — LAB SERVICES (OUTPATIENT)
Dept: LAB | Age: 67
End: 2017-08-16

## 2017-08-16 ENCOUNTER — OFFICE VISIT (OUTPATIENT)
Dept: PULMONOLOGY | Age: 67
End: 2017-08-16
Attending: INTERNAL MEDICINE

## 2017-08-16 VITALS
BODY MASS INDEX: 26.42 KG/M2 | OXYGEN SATURATION: 98 % | DIASTOLIC BLOOD PRESSURE: 64 MMHG | HEART RATE: 93 BPM | SYSTOLIC BLOOD PRESSURE: 110 MMHG | WEIGHT: 149.1 LBS | HEIGHT: 63 IN

## 2017-08-16 DIAGNOSIS — I27.20 PHT (PULMONARY HYPERTENSION) (CMD): Primary | ICD-10-CM

## 2017-08-16 DIAGNOSIS — Z79.01 ANTICOAGULANT LONG-TERM USE: Primary | ICD-10-CM

## 2017-08-16 DIAGNOSIS — Z79.899 MEDICATION MANAGEMENT: ICD-10-CM

## 2017-08-16 DIAGNOSIS — Z79.899 MEDICATION MANAGEMENT: Primary | ICD-10-CM

## 2017-08-16 DIAGNOSIS — I27.20 PULMONARY HTN (CMD): ICD-10-CM

## 2017-08-16 DIAGNOSIS — G47.34 NOCTURNAL HYPOXEMIA: ICD-10-CM

## 2017-08-16 LAB
INR PPP: 5.7
PROTHROMBIN TIME: 64.8 SEC (ref 9.7–11.8)

## 2017-08-16 PROCEDURE — 85610 PROTHROMBIN TIME: CPT | Performed by: INTERNAL MEDICINE

## 2017-08-16 PROCEDURE — 99211 OFF/OP EST MAY X REQ PHY/QHP: CPT

## 2017-08-16 PROCEDURE — 99213 OFFICE O/P EST LOW 20 MIN: CPT | Performed by: INTERNAL MEDICINE

## 2017-08-16 PROCEDURE — 36415 COLL VENOUS BLD VENIPUNCTURE: CPT | Performed by: INTERNAL MEDICINE

## 2017-08-21 ENCOUNTER — TELEPHONE (OUTPATIENT)
Dept: TRANSPLANT | Age: 67
End: 2017-08-21

## 2017-08-22 ENCOUNTER — TELEPHONE (OUTPATIENT)
Dept: TRANSPLANT | Age: 67
End: 2017-08-22

## 2017-08-22 ENCOUNTER — TELEPHONE (OUTPATIENT)
Dept: HEMATOLOGY/ONCOLOGY | Age: 67
End: 2017-08-22

## 2017-08-23 ENCOUNTER — TELEPHONE (OUTPATIENT)
Dept: TRANSPLANT | Age: 67
End: 2017-08-23

## 2017-08-23 ENCOUNTER — LAB SERVICES (OUTPATIENT)
Dept: LAB | Age: 67
End: 2017-08-23

## 2017-08-23 DIAGNOSIS — Z79.01 ANTICOAGULANT LONG-TERM USE: ICD-10-CM

## 2017-08-23 LAB
INR PPP: 3.1
PROTHROMBIN TIME: 34.8 SEC (ref 9.7–11.8)

## 2017-08-23 PROCEDURE — 85610 PROTHROMBIN TIME: CPT | Performed by: INTERNAL MEDICINE

## 2017-08-23 PROCEDURE — 36415 COLL VENOUS BLD VENIPUNCTURE: CPT | Performed by: INTERNAL MEDICINE

## 2017-08-24 ENCOUNTER — TELEPHONE (OUTPATIENT)
Dept: TRANSPLANT | Age: 67
End: 2017-08-24

## 2017-08-24 ENCOUNTER — OFFICE VISIT (OUTPATIENT)
Dept: OTOLARYNGOLOGY | Age: 67
End: 2017-08-24

## 2017-08-24 VITALS
DIASTOLIC BLOOD PRESSURE: 70 MMHG | BODY MASS INDEX: 28.32 KG/M2 | OXYGEN SATURATION: 98 % | SYSTOLIC BLOOD PRESSURE: 98 MMHG | WEIGHT: 150 LBS | HEIGHT: 61 IN | HEART RATE: 91 BPM

## 2017-08-24 DIAGNOSIS — H60.543 ECZEMA OF EXTERNAL EAR, BILATERAL: ICD-10-CM

## 2017-08-24 DIAGNOSIS — H61.23 BILATERAL IMPACTED CERUMEN: Primary | ICD-10-CM

## 2017-08-24 PROCEDURE — 69210 REMOVE IMPACTED EAR WAX UNI: CPT | Performed by: OTOLARYNGOLOGY

## 2017-08-24 RX ORDER — TRIAMCINOLONE ACETONIDE 0.25 MG/G
CREAM TOPICAL 2 TIMES DAILY
Qty: 30 G | Refills: 0 | Status: SHIPPED | OUTPATIENT
Start: 2017-08-24

## 2017-08-25 ENCOUNTER — TELEPHONE (OUTPATIENT)
Dept: TRANSPLANT | Age: 67
End: 2017-08-25

## 2017-08-25 ENCOUNTER — LAB SERVICES (OUTPATIENT)
Dept: LAB | Age: 67
End: 2017-08-25

## 2017-08-25 DIAGNOSIS — Z79.01 ANTICOAGULANT LONG-TERM USE: ICD-10-CM

## 2017-08-25 LAB
INR PPP: 1.7
PROTHROMBIN TIME: 18 SEC (ref 9.7–11.8)

## 2017-08-25 PROCEDURE — 36415 COLL VENOUS BLD VENIPUNCTURE: CPT | Performed by: INTERNAL MEDICINE

## 2017-08-25 PROCEDURE — 85610 PROTHROMBIN TIME: CPT | Performed by: INTERNAL MEDICINE

## 2017-08-28 ENCOUNTER — TELEPHONE (OUTPATIENT)
Dept: TRANSPLANT | Age: 67
End: 2017-08-28

## 2017-08-28 ENCOUNTER — TELEPHONE (OUTPATIENT)
Dept: HEMATOLOGY/ONCOLOGY | Age: 67
End: 2017-08-28

## 2017-08-28 DIAGNOSIS — D50.9 IRON DEFICIENCY ANEMIA, UNSPECIFIED IRON DEFICIENCY ANEMIA TYPE: ICD-10-CM

## 2017-08-28 DIAGNOSIS — D63.8 ANEMIA OF CHRONIC DISEASE: Primary | ICD-10-CM

## 2017-09-05 ENCOUNTER — TELEPHONE (OUTPATIENT)
Dept: PULMONOLOGY | Age: 67
End: 2017-09-05

## 2017-09-07 ENCOUNTER — LAB SERVICES (OUTPATIENT)
Dept: LAB | Age: 67
End: 2017-09-07

## 2017-09-07 DIAGNOSIS — D64.9 ANEMIA, UNSPECIFIED TYPE: ICD-10-CM

## 2017-09-07 DIAGNOSIS — D63.8 ANEMIA OF CHRONIC DISEASE: ICD-10-CM

## 2017-09-07 LAB
ALBUMIN SERPL-MCNC: 3.4 G/DL (ref 3.6–5.1)
ALBUMIN/GLOB SERPL: 0.8 {RATIO} (ref 1–2.4)
ALP SERPL-CCNC: 87 UNITS/L (ref 45–117)
ALT SERPL-CCNC: 20 UNITS/L
ANION GAP SERPL CALC-SCNC: 14 MMOL/L (ref 10–20)
AST SERPL-CCNC: 20 UNITS/L
BASOPHILS # BLD AUTO: 0 K/MCL (ref 0–0.3)
BASOPHILS NFR BLD AUTO: 1 %
BILIRUB SERPL-MCNC: 0.9 MG/DL (ref 0.2–1)
BUN SERPL-MCNC: 15 MG/DL (ref 6–20)
BUN/CREAT SERPL: 22 (ref 7–25)
CALCIUM SERPL-MCNC: 8.7 MG/DL (ref 8.4–10.2)
CHLORIDE SERPL-SCNC: 106 MMOL/L (ref 98–107)
CO2 SERPL-SCNC: 27 MMOL/L (ref 21–32)
CREAT SERPL-MCNC: 0.67 MG/DL (ref 0.51–0.95)
DIFFERENTIAL METHOD BLD: ABNORMAL
EOSINOPHIL # BLD AUTO: 0.6 K/MCL (ref 0.1–0.5)
EOSINOPHIL NFR SPEC: 10 %
ERYTHROCYTE [DISTWIDTH] IN BLOOD: 19.1 % (ref 11–15)
FASTING STATUS PATIENT QL REPORTED: 12 HRS
FERRITIN SERPL-MCNC: 26 NG/ML (ref 8–252)
GLOBULIN SER-MCNC: 4.1 G/DL (ref 2–4)
GLUCOSE SERPL-MCNC: 92 MG/DL (ref 65–99)
HCT VFR BLD CALC: 37.8 % (ref 36–46.5)
HGB BLD-MCNC: 12.1 G/DL (ref 12–15.5)
IRON SATN MFR SERPL: 9 % (ref 15–45)
IRON SERPL-MCNC: 34 MCG/DL (ref 50–170)
LYMPHOCYTES # BLD MANUAL: 1.1 K/MCL (ref 1–4)
LYMPHOCYTES NFR BLD MANUAL: 19 %
MCH RBC QN AUTO: 27.4 PG (ref 26–34)
MCHC RBC AUTO-ENTMCNC: 32 G/DL (ref 32–36.5)
MCV RBC AUTO: 85.7 FL (ref 78–100)
MONOCYTES # BLD MANUAL: 0.4 K/MCL (ref 0.3–0.9)
MONOCYTES NFR BLD MANUAL: 7 %
NEUTROPHILS # BLD: 3.6 K/MCL (ref 1.8–7.7)
NEUTROPHILS NFR BLD AUTO: 63 %
PLATELET # BLD: 287 K/MCL (ref 140–450)
POTASSIUM SERPL-SCNC: 3.8 MMOL/L (ref 3.4–5.1)
PROT SERPL-MCNC: 7.5 G/DL (ref 6.4–8.2)
RBC # BLD: 4.41 MIL/MCL (ref 4–5.2)
SODIUM SERPL-SCNC: 143 MMOL/L (ref 135–145)
TIBC SERPL-MCNC: 370 MCG/DL (ref 250–450)
TSH SERPL-ACNC: 1.49 MCUNITS/ML (ref 0.35–5)
WBC # BLD: 5.7 K/MCL (ref 4.2–11)

## 2017-09-07 PROCEDURE — 85025 COMPLETE CBC W/AUTO DIFF WBC: CPT | Performed by: INTERNAL MEDICINE

## 2017-09-07 PROCEDURE — 80053 COMPREHEN METABOLIC PANEL: CPT | Performed by: INTERNAL MEDICINE

## 2017-09-07 PROCEDURE — 36415 COLL VENOUS BLD VENIPUNCTURE: CPT | Performed by: INTERNAL MEDICINE

## 2017-09-08 ENCOUNTER — OFFICE VISIT (OUTPATIENT)
Dept: HEMATOLOGY/ONCOLOGY | Age: 67
End: 2017-09-08
Attending: INTERNAL MEDICINE

## 2017-09-08 ENCOUNTER — IMAGING SERVICES (OUTPATIENT)
Dept: CV DIAGNOSTICS | Age: 67
End: 2017-09-08
Attending: INTERNAL MEDICINE

## 2017-09-08 ENCOUNTER — FOLLOW UP (OUTPATIENT)
Dept: TRANSPLANT | Age: 67
End: 2017-09-08
Attending: INTERNAL MEDICINE

## 2017-09-08 VITALS
HEIGHT: 63 IN | SYSTOLIC BLOOD PRESSURE: 91 MMHG | OXYGEN SATURATION: 97 % | BODY MASS INDEX: 26.44 KG/M2 | DIASTOLIC BLOOD PRESSURE: 59 MMHG | HEART RATE: 87 BPM | WEIGHT: 149.2 LBS

## 2017-09-08 DIAGNOSIS — I27.20 PULMONARY HYPERTENSION (CMD): ICD-10-CM

## 2017-09-08 DIAGNOSIS — I27.82 OTHER CHRONIC PULMONARY EMBOLISM WITHOUT ACUTE COR PULMONALE (CMD): ICD-10-CM

## 2017-09-08 DIAGNOSIS — Z23 NEED FOR VACCINATION FOR STREP PNEUMONIAE: Primary | ICD-10-CM

## 2017-09-08 DIAGNOSIS — D63.8 ANEMIA OF CHRONIC DISEASE: Primary | ICD-10-CM

## 2017-09-08 LAB
EST RIGHT VENT SYSTOLIC PRESSURE BY TRICUSPID REGURGITATION JET (RVSP): 36.9 MMHG
EST RIGHT VENT SYSTOLIC PRESSURE BY TRICUSPID REGURGITATION JET (RVSP): 44.4 MMHG
PV PEAK VELOCITY (PVPV): 1.1 M/S
RIGHT VENTRICULAR AREA CHANGE (APICAL 4-CHAMBER VIEW) (RVFAC): 32.7 %
RIGHT VENTRICULAR AREA CHANGE (APICAL 4-CHAMBER VIEW) (RVFAC): 37.7 %
RV TISSUE DOPPLER FREE WALL HEART RATE (RVSTV): 0.1 M/S
RV TISSUE DOPPLER FREE WALL HEART RATE (RVSTV): 0.1 M/S
TRICUSPID VALVE PEAK REGURGITATION VELOCITY (TRPV): 2.9 M/S
TRICUSPID VALVE PEAK REGURGITATION VELOCITY (TRPV): 3.2 M/S
TV ESTIMATED RIGHT ARTERIAL PRESSURE (RAP): 3 MMHG
TV ESTIMATED RIGHT ARTERIAL PRESSURE (RAP): 3 MMHG

## 2017-09-08 PROCEDURE — 99214 OFFICE O/P EST MOD 30 MIN: CPT | Performed by: INTERNAL MEDICINE

## 2017-09-08 PROCEDURE — 10002800 HB RX 250 W HCPCS: Performed by: INTERNAL MEDICINE

## 2017-09-08 PROCEDURE — 93321 DOPPLER ECHO F-UP/LMTD STD: CPT | Performed by: INTERNAL MEDICINE

## 2017-09-08 PROCEDURE — 76376 3D RENDER W/INTRP POSTPROCES: CPT | Performed by: INTERNAL MEDICINE

## 2017-09-08 PROCEDURE — 99213 OFFICE O/P EST LOW 20 MIN: CPT | Performed by: INTERNAL MEDICINE

## 2017-09-08 PROCEDURE — 90732 PPSV23 VACC 2 YRS+ SUBQ/IM: CPT | Performed by: INTERNAL MEDICINE

## 2017-09-08 PROCEDURE — 0399T ECHO LIMITED: CPT

## 2017-09-08 PROCEDURE — 0399T ECHO LIMITED: CPT | Performed by: INTERNAL MEDICINE

## 2017-09-08 PROCEDURE — 93325 DOPPLER ECHO COLOR FLOW MAPG: CPT | Performed by: INTERNAL MEDICINE

## 2017-09-08 PROCEDURE — 90471 IMMUNIZATION ADMIN: CPT | Performed by: INTERNAL MEDICINE

## 2017-09-08 PROCEDURE — 93308 TTE F-UP OR LMTD: CPT | Performed by: INTERNAL MEDICINE

## 2017-09-08 RX ORDER — ECHINACEA PURPUREA EXTRACT 125 MG
1 TABLET ORAL PRN
COMMUNITY
End: 2017-11-20 | Stop reason: ALTCHOICE

## 2017-09-08 RX ORDER — SODIUM CHLORIDE/ALOE VERA
GEL (GRAM) NASAL DAILY
COMMUNITY
End: 2017-11-20 | Stop reason: ALTCHOICE

## 2017-09-08 RX ADMIN — PNEUMOCOCCAL VACCINE POLYVALENT 0.5 ML
25; 25; 25; 25; 25; 25; 25; 25; 25; 25; 25; 25; 25; 25; 25; 25; 25; 25; 25; 25; 25; 25; 25 INJECTION, SOLUTION INTRAMUSCULAR; SUBCUTANEOUS at 10:38

## 2017-09-08 ASSESSMENT — PAIN SCALES - GENERAL: PAINLEVEL: 0

## 2017-09-10 ENCOUNTER — HISTORICAL DOCUMENTS (OUTPATIENT)
Dept: OTHER | Age: 67
End: 2017-09-10

## 2017-09-11 PROBLEM — D50.9 IRON DEFICIENCY ANEMIA: Status: ACTIVE | Noted: 2017-09-11

## 2017-09-11 RX ORDER — FAMOTIDINE 10 MG/ML
20 INJECTION, SOLUTION INTRAVENOUS
Status: CANCELLED | OUTPATIENT
Start: 2017-09-12

## 2017-09-11 RX ORDER — FAMOTIDINE 10 MG/ML
20 INJECTION, SOLUTION INTRAVENOUS
Status: CANCELLED | OUTPATIENT
Start: 2017-09-21

## 2017-09-11 RX ORDER — ALBUTEROL SULFATE 90 UG/1
2 AEROSOL, METERED RESPIRATORY (INHALATION)
Status: CANCELLED | OUTPATIENT
Start: 2017-09-12

## 2017-09-11 RX ORDER — ALBUTEROL SULFATE 90 UG/1
2 AEROSOL, METERED RESPIRATORY (INHALATION)
Status: CANCELLED | OUTPATIENT
Start: 2017-09-21

## 2017-09-11 RX ORDER — DIPHENHYDRAMINE HYDROCHLORIDE 50 MG/ML
25 INJECTION INTRAMUSCULAR; INTRAVENOUS
Status: CANCELLED
Start: 2017-09-19

## 2017-09-11 RX ORDER — FAMOTIDINE 10 MG/ML
20 INJECTION, SOLUTION INTRAVENOUS
Status: CANCELLED | OUTPATIENT
Start: 2017-09-19

## 2017-09-11 RX ORDER — DIPHENHYDRAMINE HYDROCHLORIDE 50 MG/ML
25 INJECTION INTRAMUSCULAR; INTRAVENOUS
Status: CANCELLED
Start: 2017-09-21

## 2017-09-11 RX ORDER — DIPHENHYDRAMINE HYDROCHLORIDE 50 MG/ML
25 INJECTION INTRAMUSCULAR; INTRAVENOUS
Status: CANCELLED
Start: 2017-09-12

## 2017-09-11 RX ORDER — ALBUTEROL SULFATE 90 UG/1
2 AEROSOL, METERED RESPIRATORY (INHALATION)
Status: CANCELLED | OUTPATIENT
Start: 2017-09-19

## 2017-09-12 ENCOUNTER — OFFICE VISIT (OUTPATIENT)
Dept: HEMATOLOGY/ONCOLOGY | Age: 67
End: 2017-09-12
Attending: INTERNAL MEDICINE

## 2017-09-12 VITALS
HEART RATE: 72 BPM | TEMPERATURE: 98 F | OXYGEN SATURATION: 97 % | SYSTOLIC BLOOD PRESSURE: 106 MMHG | DIASTOLIC BLOOD PRESSURE: 66 MMHG

## 2017-09-12 DIAGNOSIS — D64.9 ANEMIA, UNSPECIFIED TYPE: ICD-10-CM

## 2017-09-12 DIAGNOSIS — D50.9 IRON DEFICIENCY ANEMIA, UNSPECIFIED IRON DEFICIENCY ANEMIA TYPE: Primary | ICD-10-CM

## 2017-09-12 PROCEDURE — 96374 THER/PROPH/DIAG INJ IV PUSH: CPT | Performed by: NURSE PRACTITIONER

## 2017-09-12 PROCEDURE — 10002800 HB RX 250 W HCPCS: Performed by: NURSE PRACTITIONER

## 2017-09-12 RX ADMIN — IRON SUCROSE 200 MG: 20 INJECTION, SOLUTION INTRAVENOUS at 12:51

## 2017-09-18 ENCOUNTER — TELEPHONE (OUTPATIENT)
Dept: TRANSPLANT | Age: 67
End: 2017-09-18

## 2017-09-19 ENCOUNTER — OFFICE VISIT (OUTPATIENT)
Dept: HEMATOLOGY/ONCOLOGY | Age: 67
End: 2017-09-19
Attending: INTERNAL MEDICINE

## 2017-09-19 DIAGNOSIS — D64.9 ANEMIA, UNSPECIFIED TYPE: ICD-10-CM

## 2017-09-19 DIAGNOSIS — D50.9 IRON DEFICIENCY ANEMIA, UNSPECIFIED IRON DEFICIENCY ANEMIA TYPE: Primary | ICD-10-CM

## 2017-09-19 PROCEDURE — 96374 THER/PROPH/DIAG INJ IV PUSH: CPT | Performed by: NURSE PRACTITIONER

## 2017-09-19 PROCEDURE — 10002807 HB RX 258: Performed by: NURSE PRACTITIONER

## 2017-09-19 PROCEDURE — 10002800 HB RX 250 W HCPCS: Performed by: NURSE PRACTITIONER

## 2017-09-19 RX ADMIN — IRON SUCROSE 200 MG: 20 INJECTION, SOLUTION INTRAVENOUS at 12:46

## 2017-09-19 RX ADMIN — SODIUM CHLORIDE 250 ML: 9 INJECTION, SOLUTION INTRAVENOUS at 12:40

## 2017-09-19 ASSESSMENT — PAIN SCALES - GENERAL: PAINLEVEL: 0

## 2017-09-26 ENCOUNTER — APPOINTMENT (OUTPATIENT)
Dept: HEMATOLOGY/ONCOLOGY | Age: 67
End: 2017-09-26
Attending: INTERNAL MEDICINE

## 2017-09-28 ENCOUNTER — TELEPHONE (OUTPATIENT)
Dept: PULMONOLOGY | Age: 67
End: 2017-09-28

## 2017-09-29 ENCOUNTER — TELEPHONE (OUTPATIENT)
Dept: TRANSPLANT | Age: 67
End: 2017-09-29

## 2017-10-02 ENCOUNTER — OFFICE VISIT (OUTPATIENT)
Dept: OTOLARYNGOLOGY | Age: 67
End: 2017-10-02

## 2017-10-02 VITALS
BODY MASS INDEX: 26.4 KG/M2 | OXYGEN SATURATION: 95 % | WEIGHT: 149 LBS | HEART RATE: 86 BPM | HEIGHT: 63 IN | SYSTOLIC BLOOD PRESSURE: 100 MMHG | DIASTOLIC BLOOD PRESSURE: 64 MMHG

## 2017-10-02 DIAGNOSIS — H61.23 BILATERAL IMPACTED CERUMEN: Primary | ICD-10-CM

## 2017-10-02 DIAGNOSIS — K21.9 LPRD (LARYNGOPHARYNGEAL REFLUX DISEASE): ICD-10-CM

## 2017-10-02 PROCEDURE — 99213 OFFICE O/P EST LOW 20 MIN: CPT | Performed by: OTOLARYNGOLOGY

## 2017-10-02 RX ORDER — TRIAMCINOLONE ACETONIDE 0.25 MG/G
OINTMENT TOPICAL 2 TIMES DAILY
Qty: 30 G | Refills: 0 | Status: SHIPPED | OUTPATIENT
Start: 2017-10-02 | End: 2017-11-06 | Stop reason: SDUPTHER

## 2017-10-04 ENCOUNTER — TELEPHONE (OUTPATIENT)
Dept: TRANSPLANT | Age: 67
End: 2017-10-04

## 2017-10-04 ENCOUNTER — OFFICE VISIT (OUTPATIENT)
Dept: HEMATOLOGY/ONCOLOGY | Age: 67
End: 2017-10-04
Attending: INTERNAL MEDICINE

## 2017-10-04 ENCOUNTER — CLINICAL ABSTRACT (OUTPATIENT)
Dept: TRANSPLANT | Age: 67
End: 2017-10-04

## 2017-10-04 DIAGNOSIS — D50.9 IRON DEFICIENCY ANEMIA, UNSPECIFIED IRON DEFICIENCY ANEMIA TYPE: Primary | ICD-10-CM

## 2017-10-04 DIAGNOSIS — D64.9 ANEMIA, UNSPECIFIED TYPE: ICD-10-CM

## 2017-10-04 PROCEDURE — 96374 THER/PROPH/DIAG INJ IV PUSH: CPT | Performed by: NURSE PRACTITIONER

## 2017-10-04 PROCEDURE — 10002807 HB RX 258: Performed by: NURSE PRACTITIONER

## 2017-10-04 PROCEDURE — 10002800 HB RX 250 W HCPCS: Performed by: NURSE PRACTITIONER

## 2017-10-04 RX ORDER — DIPHENHYDRAMINE HYDROCHLORIDE 50 MG/ML
25 INJECTION INTRAMUSCULAR; INTRAVENOUS
Status: CANCELLED
Start: 2017-10-04

## 2017-10-04 RX ORDER — ALBUTEROL SULFATE 90 UG/1
2 AEROSOL, METERED RESPIRATORY (INHALATION)
Status: CANCELLED | OUTPATIENT
Start: 2017-10-04

## 2017-10-04 RX ORDER — FAMOTIDINE 10 MG/ML
20 INJECTION, SOLUTION INTRAVENOUS
Status: CANCELLED | OUTPATIENT
Start: 2017-10-04

## 2017-10-04 RX ADMIN — IRON SUCROSE 200 MG: 20 INJECTION, SOLUTION INTRAVENOUS at 09:22

## 2017-10-04 RX ADMIN — SODIUM CHLORIDE 250 ML: 9 INJECTION, SOLUTION INTRAVENOUS at 09:09

## 2017-10-04 ASSESSMENT — PAIN SCALES - GENERAL: PAINLEVEL: 0

## 2017-10-09 ENCOUNTER — TELEPHONE (OUTPATIENT)
Dept: TRANSPLANT | Age: 67
End: 2017-10-09

## 2017-10-09 RX ORDER — ENOXAPARIN SODIUM 100 MG/ML
70 INJECTION SUBCUTANEOUS EVERY 12 HOURS SCHEDULED
Qty: 20 SYRINGE | Refills: 1 | Status: SHIPPED | OUTPATIENT
Start: 2017-10-09 | End: 2017-11-20 | Stop reason: ALTCHOICE

## 2017-10-11 ENCOUNTER — CLINICAL ABSTRACT (OUTPATIENT)
Dept: TRANSPLANT | Age: 67
End: 2017-10-11

## 2017-10-18 ENCOUNTER — OFF PREMISE (OUTPATIENT)
Dept: OTHER | Age: 67
End: 2017-10-18

## 2017-10-19 ENCOUNTER — OFF PREMISE (OUTPATIENT)
Dept: OTHER | Age: 67
End: 2017-10-19

## 2017-10-22 ENCOUNTER — OFF PREMISE (OUTPATIENT)
Dept: OTHER | Age: 67
End: 2017-10-22

## 2017-10-25 ENCOUNTER — CLINICAL ABSTRACT (OUTPATIENT)
Dept: TRANSPLANT | Age: 67
End: 2017-10-25

## 2017-11-02 ENCOUNTER — TELEPHONE (OUTPATIENT)
Dept: TRANSPLANT | Age: 67
End: 2017-11-02

## 2017-11-02 DIAGNOSIS — D69.9 ANTICOAGULANT DISORDER (CMD): Primary | ICD-10-CM

## 2017-11-04 ENCOUNTER — LAB SERVICES (OUTPATIENT)
Dept: LAB | Age: 67
End: 2017-11-04

## 2017-11-04 DIAGNOSIS — Z79.01 ANTICOAGULANT LONG-TERM USE: ICD-10-CM

## 2017-11-04 LAB
INR PPP: 2.1
PROTHROMBIN TIME: 20.8 SEC (ref 9.7–11.8)

## 2017-11-04 PROCEDURE — 36415 COLL VENOUS BLD VENIPUNCTURE: CPT | Performed by: INTERNAL MEDICINE

## 2017-11-04 PROCEDURE — 85610 PROTHROMBIN TIME: CPT | Performed by: INTERNAL MEDICINE

## 2017-11-06 ENCOUNTER — FOLLOW UP (OUTPATIENT)
Dept: TRANSPLANT | Age: 67
End: 2017-11-06
Attending: PHYSICIAN ASSISTANT

## 2017-11-06 ENCOUNTER — TELEPHONE (OUTPATIENT)
Dept: TRANSPLANT | Age: 67
End: 2017-11-06

## 2017-11-06 VITALS
DIASTOLIC BLOOD PRESSURE: 66 MMHG | OXYGEN SATURATION: 98 % | SYSTOLIC BLOOD PRESSURE: 113 MMHG | HEIGHT: 63 IN | BODY MASS INDEX: 25.55 KG/M2 | WEIGHT: 144.2 LBS | HEART RATE: 92 BPM

## 2017-11-06 DIAGNOSIS — Z98.890 HISTORY OF ENDARTERECTOMY: ICD-10-CM

## 2017-11-06 DIAGNOSIS — I27.82 OTHER CHRONIC PULMONARY EMBOLISM WITHOUT ACUTE COR PULMONALE (CMD): Primary | ICD-10-CM

## 2017-11-06 PROCEDURE — 10004187 HB COUNTER-VISIT CENSUS, OUTPATIENT

## 2017-11-06 PROCEDURE — 99213 OFFICE O/P EST LOW 20 MIN: CPT | Performed by: PHYSICIAN ASSISTANT

## 2017-11-06 PROCEDURE — 99212 OFFICE O/P EST SF 10 MIN: CPT

## 2017-11-06 RX ORDER — HYDROCODONE BITARTRATE AND ACETAMINOPHEN 5; 325 MG/1; MG/1
1 TABLET ORAL EVERY 4 HOURS PRN
COMMUNITY
End: 2017-12-13

## 2017-11-06 RX ORDER — WARFARIN SODIUM 10 MG/1
10 TABLET ORAL EVERY EVENING
COMMUNITY

## 2017-11-06 ASSESSMENT — NEW YORK HEART ASSOCIATION (NYHA) CLASSIFICATION: NYHA FUNCTIONAL CLASS: II

## 2017-11-08 ENCOUNTER — LAB SERVICES (OUTPATIENT)
Dept: LAB | Age: 67
End: 2017-11-08

## 2017-11-08 ENCOUNTER — TELEPHONE (OUTPATIENT)
Dept: TRANSPLANT | Age: 67
End: 2017-11-08

## 2017-11-08 DIAGNOSIS — Z79.01 ANTICOAGULANT LONG-TERM USE: ICD-10-CM

## 2017-11-08 LAB
INR PPP: 2.3
PROTHROMBIN TIME: 22.4 SEC (ref 9.7–11.8)

## 2017-11-08 PROCEDURE — 36415 COLL VENOUS BLD VENIPUNCTURE: CPT | Performed by: INTERNAL MEDICINE

## 2017-11-08 PROCEDURE — 85610 PROTHROMBIN TIME: CPT | Performed by: INTERNAL MEDICINE

## 2017-11-13 ENCOUNTER — LAB SERVICES (OUTPATIENT)
Dept: LAB | Age: 67
End: 2017-11-13

## 2017-11-13 ENCOUNTER — TELEPHONE (OUTPATIENT)
Dept: TRANSPLANT | Age: 67
End: 2017-11-13

## 2017-11-13 DIAGNOSIS — Z79.01 ANTICOAGULATED ON COUMADIN: Primary | ICD-10-CM

## 2017-11-13 DIAGNOSIS — Z79.01 ANTICOAGULANT LONG-TERM USE: ICD-10-CM

## 2017-11-13 LAB
INR PPP: 3.7
PROTHROMBIN TIME: 35.6 SEC (ref 9.7–11.8)

## 2017-11-13 PROCEDURE — 85610 PROTHROMBIN TIME: CPT | Performed by: INTERNAL MEDICINE

## 2017-11-13 PROCEDURE — 36415 COLL VENOUS BLD VENIPUNCTURE: CPT | Performed by: INTERNAL MEDICINE

## 2017-11-13 RX ORDER — WARFARIN SODIUM 2.5 MG/1
2.5 TABLET ORAL DAILY
Qty: 30 TABLET | Refills: 0 | Status: SHIPPED | OUTPATIENT
Start: 2017-11-13

## 2017-11-20 ENCOUNTER — OFFICE VISIT (OUTPATIENT)
Dept: PULMONOLOGY | Age: 67
End: 2017-11-20
Attending: INTERNAL MEDICINE

## 2017-11-20 VITALS
BODY MASS INDEX: 25.72 KG/M2 | DIASTOLIC BLOOD PRESSURE: 60 MMHG | HEART RATE: 97 BPM | OXYGEN SATURATION: 97 % | WEIGHT: 145.2 LBS | SYSTOLIC BLOOD PRESSURE: 90 MMHG

## 2017-11-20 DIAGNOSIS — G47.34 NOCTURNAL HYPOXEMIA: ICD-10-CM

## 2017-11-20 DIAGNOSIS — I27.24 CTEPH (CHRONIC THROMBOEMBOLIC PULMONARY HYPERTENSION) (CMD): Primary | ICD-10-CM

## 2017-11-20 PROCEDURE — 99213 OFFICE O/P EST LOW 20 MIN: CPT | Performed by: INTERNAL MEDICINE

## 2017-11-20 PROCEDURE — 99211 OFF/OP EST MAY X REQ PHY/QHP: CPT

## 2017-11-21 ENCOUNTER — TELEPHONE (OUTPATIENT)
Dept: TRANSPLANT | Age: 67
End: 2017-11-21

## 2017-11-21 ENCOUNTER — LAB SERVICES (OUTPATIENT)
Dept: LAB | Age: 67
End: 2017-11-21

## 2017-11-21 DIAGNOSIS — D69.9 ANTICOAGULANT DISORDER (CMD): Primary | ICD-10-CM

## 2017-11-21 DIAGNOSIS — I27.20 PULMONARY HYPERTENSION (CMD): Primary | ICD-10-CM

## 2017-11-21 DIAGNOSIS — D63.8 ANEMIA OF CHRONIC DISEASE: ICD-10-CM

## 2017-11-21 DIAGNOSIS — I27.24 CTEPH (CHRONIC THROMBOEMBOLIC PULMONARY HYPERTENSION) (CMD): ICD-10-CM

## 2017-11-21 DIAGNOSIS — Z79.01 ANTICOAGULANT LONG-TERM USE: ICD-10-CM

## 2017-11-21 LAB
ALBUMIN SERPL-MCNC: 3.5 G/DL (ref 3.6–5.1)
ALBUMIN/GLOB SERPL: 0.9 {RATIO} (ref 1–2.4)
ALP SERPL-CCNC: 131 UNITS/L (ref 45–117)
ALT SERPL-CCNC: 25 UNITS/L
ANION GAP SERPL CALC-SCNC: 14 MMOL/L (ref 10–20)
AST SERPL-CCNC: 20 UNITS/L
BASOPHILS # BLD AUTO: 0 K/MCL (ref 0–0.3)
BASOPHILS NFR BLD AUTO: 1 %
BILIRUB SERPL-MCNC: 0.7 MG/DL (ref 0.2–1)
BUN SERPL-MCNC: 15 MG/DL (ref 6–20)
BUN/CREAT SERPL: 22 (ref 7–25)
CALCIUM SERPL-MCNC: 9.3 MG/DL (ref 8.4–10.2)
CHLORIDE SERPL-SCNC: 104 MMOL/L (ref 98–107)
CO2 SERPL-SCNC: 27 MMOL/L (ref 21–32)
CREAT SERPL-MCNC: 0.67 MG/DL (ref 0.51–0.95)
DIFFERENTIAL METHOD BLD: ABNORMAL
EOSINOPHIL # BLD AUTO: 0.2 K/MCL (ref 0.1–0.5)
EOSINOPHIL NFR SPEC: 4 %
ERYTHROCYTE [DISTWIDTH] IN BLOOD: 15.5 % (ref 11–15)
FASTING STATUS PATIENT QL REPORTED: 12 HRS
FERRITIN SERPL-MCNC: 177 NG/ML (ref 8–252)
GLOBULIN SER-MCNC: 4 G/DL (ref 2–4)
GLUCOSE SERPL-MCNC: 84 MG/DL (ref 65–99)
HCT VFR BLD CALC: 39.6 % (ref 36–46.5)
HGB BLD-MCNC: 12.7 G/DL (ref 12–15.5)
INR PPP: 5.3
IRON SATN MFR SERPL: 16 % (ref 15–45)
IRON SERPL-MCNC: 51 MCG/DL (ref 50–170)
LDH SERPL L TO P-CCNC: 249 UNITS/L (ref 82–240)
LYMPHOCYTES # BLD MANUAL: 1.4 K/MCL (ref 1–4)
LYMPHOCYTES NFR BLD MANUAL: 26 %
MCH RBC QN AUTO: 28.8 PG (ref 26–34)
MCHC RBC AUTO-ENTMCNC: 32.1 G/DL (ref 32–36.5)
MCV RBC AUTO: 89.8 FL (ref 78–100)
MONOCYTES # BLD MANUAL: 0.5 K/MCL (ref 0.3–0.9)
MONOCYTES NFR BLD MANUAL: 10 %
NEUTROPHILS # BLD: 3.3 K/MCL (ref 1.8–7.7)
NEUTROPHILS NFR BLD AUTO: 59 %
PLATELET # BLD: 319 K/MCL (ref 140–450)
POTASSIUM SERPL-SCNC: 4.3 MMOL/L (ref 3.4–5.1)
PROT SERPL-MCNC: 7.5 G/DL (ref 6.4–8.2)
PROTHROMBIN TIME: 50.5 SEC (ref 9.7–11.8)
RBC # BLD: 4.41 MIL/MCL (ref 4–5.2)
SODIUM SERPL-SCNC: 141 MMOL/L (ref 135–145)
TIBC SERPL-MCNC: 315 MCG/DL (ref 250–450)
WBC # BLD: 5.5 K/MCL (ref 4.2–11)

## 2017-11-21 PROCEDURE — 85025 COMPLETE CBC W/AUTO DIFF WBC: CPT | Performed by: INTERNAL MEDICINE

## 2017-11-21 PROCEDURE — 36415 COLL VENOUS BLD VENIPUNCTURE: CPT | Performed by: INTERNAL MEDICINE

## 2017-11-21 PROCEDURE — 85610 PROTHROMBIN TIME: CPT | Performed by: INTERNAL MEDICINE

## 2017-11-21 PROCEDURE — 80053 COMPREHEN METABOLIC PANEL: CPT | Performed by: INTERNAL MEDICINE

## 2017-11-21 PROCEDURE — 83615 LACTATE (LD) (LDH) ENZYME: CPT | Performed by: INTERNAL MEDICINE

## 2017-11-24 ENCOUNTER — TELEPHONE (OUTPATIENT)
Dept: TRANSPLANT | Age: 67
End: 2017-11-24

## 2017-11-24 ENCOUNTER — LAB SERVICES (OUTPATIENT)
Dept: LAB | Age: 67
End: 2017-11-24

## 2017-11-24 DIAGNOSIS — D69.9 ANTICOAGULANT DISORDER (CMD): ICD-10-CM

## 2017-11-24 LAB
INR PPP: 2.7
PROTHROMBIN TIME: 26.8 SEC (ref 9.7–11.8)

## 2017-11-24 PROCEDURE — 85610 PROTHROMBIN TIME: CPT | Performed by: INTERNAL MEDICINE

## 2017-11-24 PROCEDURE — 36415 COLL VENOUS BLD VENIPUNCTURE: CPT | Performed by: INTERNAL MEDICINE

## 2017-11-27 ENCOUNTER — OFFICE VISIT (OUTPATIENT)
Dept: HEMATOLOGY/ONCOLOGY | Age: 67
End: 2017-11-27
Attending: INTERNAL MEDICINE

## 2017-11-27 ENCOUNTER — APPOINTMENT (OUTPATIENT)
Dept: HEMATOLOGY/ONCOLOGY | Age: 67
End: 2017-11-27
Attending: INTERNAL MEDICINE

## 2017-11-27 DIAGNOSIS — D64.9 ANEMIA, UNSPECIFIED TYPE: Primary | ICD-10-CM

## 2017-11-27 PROCEDURE — 99213 OFFICE O/P EST LOW 20 MIN: CPT | Performed by: INTERNAL MEDICINE

## 2017-11-27 ASSESSMENT — PAIN SCALES - GENERAL: PAINLEVEL: 3-4

## 2017-11-30 ENCOUNTER — TELEPHONE (OUTPATIENT)
Dept: FAMILY MEDICINE | Age: 67
End: 2017-11-30

## 2017-12-01 ENCOUNTER — TELEPHONE (OUTPATIENT)
Dept: TRANSPLANT | Age: 67
End: 2017-12-01

## 2017-12-01 ENCOUNTER — LAB SERVICES (OUTPATIENT)
Dept: LAB | Age: 67
End: 2017-12-01

## 2017-12-01 DIAGNOSIS — D69.9 ANTICOAGULANT DISORDER (CMD): Primary | ICD-10-CM

## 2017-12-01 DIAGNOSIS — D69.9 ANTICOAGULANT DISORDER (CMD): ICD-10-CM

## 2017-12-01 LAB
INR PPP: 4
PROTHROMBIN TIME: 38.7 SEC (ref 9.7–11.8)

## 2017-12-01 PROCEDURE — 36415 COLL VENOUS BLD VENIPUNCTURE: CPT | Performed by: INTERNAL MEDICINE

## 2017-12-01 PROCEDURE — 85610 PROTHROMBIN TIME: CPT | Performed by: INTERNAL MEDICINE

## 2017-12-05 ENCOUNTER — TELEPHONE (OUTPATIENT)
Dept: PULMONOLOGY | Age: 67
End: 2017-12-05

## 2017-12-06 ENCOUNTER — TELEPHONE (OUTPATIENT)
Dept: TRANSPLANT | Age: 67
End: 2017-12-06

## 2017-12-06 ENCOUNTER — LAB SERVICES (OUTPATIENT)
Dept: LAB | Age: 67
End: 2017-12-06

## 2017-12-06 DIAGNOSIS — D69.9 ANTICOAGULANT DISORDER (CMD): ICD-10-CM

## 2017-12-06 LAB
INR PPP: 3
PROTHROMBIN TIME: 29.7 SEC (ref 9.7–11.8)

## 2017-12-06 PROCEDURE — 36415 COLL VENOUS BLD VENIPUNCTURE: CPT | Performed by: INTERNAL MEDICINE

## 2017-12-06 PROCEDURE — 85610 PROTHROMBIN TIME: CPT | Performed by: INTERNAL MEDICINE

## 2017-12-12 ENCOUNTER — TELEPHONE (OUTPATIENT)
Dept: TRANSPLANT | Age: 67
End: 2017-12-12

## 2017-12-12 ENCOUNTER — LAB SERVICES (OUTPATIENT)
Dept: LAB | Age: 67
End: 2017-12-12

## 2017-12-12 DIAGNOSIS — D69.9 ANTICOAGULANT DISORDER (CMD): ICD-10-CM

## 2017-12-12 LAB
INR PPP: 4.7
PROTHROMBIN TIME: 45 SEC (ref 9.7–11.8)

## 2017-12-12 PROCEDURE — 85610 PROTHROMBIN TIME: CPT | Performed by: INTERNAL MEDICINE

## 2017-12-12 PROCEDURE — 36415 COLL VENOUS BLD VENIPUNCTURE: CPT | Performed by: INTERNAL MEDICINE

## 2017-12-13 ENCOUNTER — FOLLOW UP (OUTPATIENT)
Dept: TRANSPLANT | Age: 67
End: 2017-12-13
Attending: INTERNAL MEDICINE

## 2017-12-13 VITALS
DIASTOLIC BLOOD PRESSURE: 84 MMHG | HEART RATE: 93 BPM | OXYGEN SATURATION: 96 % | WEIGHT: 145.1 LBS | SYSTOLIC BLOOD PRESSURE: 138 MMHG | HEIGHT: 63 IN | BODY MASS INDEX: 25.71 KG/M2

## 2017-12-13 DIAGNOSIS — D69.9 ANTICOAGULANT DISORDER (CMD): Primary | ICD-10-CM

## 2017-12-13 DIAGNOSIS — B99.9 INFECTION: Primary | ICD-10-CM

## 2017-12-13 PROCEDURE — 99214 OFFICE O/P EST MOD 30 MIN: CPT | Performed by: INTERNAL MEDICINE

## 2017-12-13 PROCEDURE — 87075 CULTR BACTERIA EXCEPT BLOOD: CPT

## 2017-12-13 PROCEDURE — 87186 SC STD MICRODIL/AGAR DIL: CPT

## 2017-12-13 PROCEDURE — 87147 CULTURE TYPE IMMUNOLOGIC: CPT

## 2017-12-13 RX ORDER — WARFARIN SODIUM 5 MG/1
5 TABLET ORAL DAILY
Qty: 90 TABLET | Refills: 0 | Status: SHIPPED | OUTPATIENT
Start: 2017-12-13

## 2017-12-17 LAB
BACTERIA SPEC ANAEROBE+AEROBE CULT: ABNORMAL
BACTERIA SPEC ANAEROBE+AEROBE CULT: ABNORMAL
GRAM STN SPEC: ABNORMAL
GRAM STN SPEC: ABNORMAL
ORGANISM: ABNORMAL
REPORT STATUS (RPT): ABNORMAL
SPECIMEN SOURCE: ABNORMAL

## 2017-12-18 ENCOUNTER — TELEPHONE (OUTPATIENT)
Dept: TRANSPLANT | Age: 67
End: 2017-12-18

## 2017-12-26 ENCOUNTER — TELEPHONE (OUTPATIENT)
Dept: TRANSPLANT | Age: 67
End: 2017-12-26

## 2017-12-26 ASSESSMENT — NEW YORK HEART ASSOCIATION (NYHA) CLASSIFICATION: NYHA FUNCTIONAL CLASS: II

## 2018-01-03 ENCOUNTER — TELEPHONE (OUTPATIENT)
Dept: TRANSPLANT | Age: 68
End: 2018-01-03

## 2018-01-16 ENCOUNTER — TELEPHONE (OUTPATIENT)
Dept: TRANSPLANT | Age: 68
End: 2018-01-16

## 2018-01-30 ENCOUNTER — TELEPHONE (OUTPATIENT)
Dept: TRANSPLANT | Age: 68
End: 2018-01-30

## 2018-11-15 LAB
CHOLEST SERPL-MCNC: 203 MG/DL (ref 100–199)
CHOLEST/HDLC SERPL: 5.6 RATIO (ref 0–4.4)
HDLC SERPL-MCNC: 36 MG/DL
LDL/HDL (OFFPRE3): 3.3 RATIO (ref 0–3.2)
LDLC SERPL CALC-MCNC: 120 MG/DL (ref 0–99)
LENGTH OF FAST TIME PATIENT: YES H
TRIGL SERPL-MCNC: 234 MG/DL (ref 0–149)

## 2018-11-19 ENCOUNTER — TRANSCRIBE ORDERS (OUTPATIENT)
Dept: ADMINISTRATIVE | Facility: HOSPITAL | Age: 68
End: 2018-11-19

## 2018-11-19 DIAGNOSIS — Z12.31 VISIT FOR SCREENING MAMMOGRAM: Primary | ICD-10-CM

## 2018-12-28 ENCOUNTER — TELEPHONE (OUTPATIENT)
Dept: TRANSPLANT | Age: 68
End: 2018-12-28

## 2019-01-01 ENCOUNTER — EXTERNAL RECORD (OUTPATIENT)
Dept: OTHER | Age: 69
End: 2019-01-01

## 2019-01-03 ENCOUNTER — HOSPITAL ENCOUNTER (OUTPATIENT)
Dept: MAMMOGRAPHY | Facility: HOSPITAL | Age: 69
Discharge: HOME | End: 2019-01-03
Admitting: HOSPITALIST

## 2019-01-03 DIAGNOSIS — Z12.31 VISIT FOR SCREENING MAMMOGRAM: ICD-10-CM

## 2019-01-03 PROCEDURE — 77067 SCR MAMMO BI INCL CAD: CPT | Performed by: RADIOLOGY

## 2019-01-03 PROCEDURE — 77063 BREAST TOMOSYNTHESIS BI: CPT | Performed by: RADIOLOGY

## 2019-01-03 PROCEDURE — 77063 BREAST TOMOSYNTHESIS BI: CPT

## 2019-01-03 PROCEDURE — 77067 SCR MAMMO BI INCL CAD: CPT

## 2019-01-08 ENCOUNTER — HOSPITAL ENCOUNTER (OUTPATIENT)
Dept: ULTRASOUND IMAGING | Facility: HOSPITAL | Age: 69
Discharge: HOME | End: 2019-01-08

## 2019-01-08 ENCOUNTER — HOSPITAL ENCOUNTER (OUTPATIENT)
Dept: MAMMOGRAPHY | Facility: HOSPITAL | Age: 69
Discharge: HOME | End: 2019-01-08
Admitting: RADIOLOGY

## 2019-01-08 DIAGNOSIS — R92.8 ABNORMAL MAMMOGRAM: ICD-10-CM

## 2019-01-08 PROCEDURE — 77066 DX MAMMO INCL CAD BI: CPT | Performed by: RADIOLOGY

## 2019-01-08 PROCEDURE — 76642 ULTRASOUND BREAST LIMITED: CPT | Performed by: RADIOLOGY

## 2019-01-08 PROCEDURE — 77066 DX MAMMO INCL CAD BI: CPT

## 2019-01-08 PROCEDURE — 76642 ULTRASOUND BREAST LIMITED: CPT

## 2019-01-08 PROCEDURE — G0279 TOMOSYNTHESIS, MAMMO: HCPCS | Performed by: RADIOLOGY

## 2019-01-08 PROCEDURE — G0279 TOMOSYNTHESIS, MAMMO: HCPCS

## 2019-01-10 ENCOUNTER — TELEPHONE (OUTPATIENT)
Dept: MAMMOGRAPHY | Facility: HOSPITAL | Age: 69
End: 2019-01-10

## 2019-01-10 NOTE — TELEPHONE ENCOUNTER
Pt notified antithrombotic/blood thinner clearance received from pt's provider;  medication instructions prior to & after procedure; BIS scheduling will contact pt to schedule procedure. Questions answered, support given. Pt is to contact prescribing provider with any medication questions. Pt verbalized understanding.

## 2019-01-11 ENCOUNTER — TRANSCRIBE ORDERS (OUTPATIENT)
Dept: MAMMOGRAPHY | Facility: HOSPITAL | Age: 69
End: 2019-01-11

## 2019-01-11 DIAGNOSIS — R92.8 ABNORMAL MAMMOGRAM: Primary | ICD-10-CM

## 2019-02-04 ENCOUNTER — APPOINTMENT (OUTPATIENT)
Dept: ULTRASOUND IMAGING | Facility: HOSPITAL | Age: 69
End: 2019-02-04

## 2019-02-07 ENCOUNTER — LAB SERVICES (OUTPATIENT)
Dept: LAB | Age: 69
End: 2019-02-07

## 2019-02-07 DIAGNOSIS — D69.9 ANTICOAGULANT DISORDER (CMD): ICD-10-CM

## 2019-02-07 LAB
INR PPP: 2.2
PROTHROMBIN TIME: 20.9 SEC (ref 9.7–11.8)

## 2019-02-07 PROCEDURE — 36415 COLL VENOUS BLD VENIPUNCTURE: CPT | Performed by: INTERNAL MEDICINE

## 2019-02-07 PROCEDURE — 85610 PROTHROMBIN TIME: CPT | Performed by: INTERNAL MEDICINE

## 2019-02-11 ENCOUNTER — APPOINTMENT (OUTPATIENT)
Dept: ULTRASOUND IMAGING | Facility: HOSPITAL | Age: 69
End: 2019-02-11

## 2019-02-13 ENCOUNTER — HOSPITAL ENCOUNTER (OUTPATIENT)
Dept: ULTRASOUND IMAGING | Facility: HOSPITAL | Age: 69
Discharge: HOME | End: 2019-02-13
Admitting: RADIOLOGY

## 2019-02-13 ENCOUNTER — HOSPITAL ENCOUNTER (OUTPATIENT)
Dept: ULTRASOUND IMAGING | Facility: HOSPITAL | Age: 69
Discharge: HOME | End: 2019-02-13

## 2019-02-13 ENCOUNTER — HOSPITAL ENCOUNTER (OUTPATIENT)
Dept: MAMMOGRAPHY | Facility: HOSPITAL | Age: 69
Discharge: HOME | End: 2019-02-13

## 2019-02-13 ENCOUNTER — DOCUMENTATION (OUTPATIENT)
Dept: MAMMOGRAPHY | Facility: HOSPITAL | Age: 69
End: 2019-02-13

## 2019-02-13 DIAGNOSIS — R92.8 ABNORMAL MAMMOGRAM: ICD-10-CM

## 2019-02-13 PROCEDURE — 77066 DX MAMMO INCL CAD BI: CPT | Performed by: RADIOLOGY

## 2019-02-13 PROCEDURE — 88305 TISSUE EXAM BY PATHOLOGIST: CPT | Performed by: HOSPITALIST

## 2019-02-13 PROCEDURE — A4648 IMPLANTABLE TISSUE MARKER: HCPCS

## 2019-02-13 PROCEDURE — 19083 BX BREAST 1ST LESION US IMAG: CPT | Performed by: RADIOLOGY

## 2019-02-13 PROCEDURE — 19084 BX BREAST ADD LESION US IMAG: CPT | Performed by: RADIOLOGY

## 2019-02-13 RX ORDER — LIDOCAINE HYDROCHLORIDE 10 MG/ML
5 INJECTION, SOLUTION INFILTRATION; PERINEURAL ONCE
Status: DISCONTINUED | OUTPATIENT
Start: 2019-02-13 | End: 2019-04-02 | Stop reason: HOSPADM

## 2019-02-13 NOTE — PROGRESS NOTES
Pt called in at approximately 1045 saying she needed to speak with someone about her bx & INR result from yesterday. I returned pt call at 11:07. States her INR was 1.6, (out of range) & Dr Carbajal wanted the pt to have INR redrawn today. She was going to  [facility] office to have the lab drawn. I asked that we get result with Solomon's recommendation prior to bx this afternoon. Dr RADHA Hemphill notified & asked that [facility] office be notified. At 1204 I spoke with Kimberly at [facility] office who said pt was at the office now getting labs drawn. After multiple interruptions & being put on hold, I was told labs would be drawn & Dr Carbajal was still in the office seeing pts. Kimberly was given Dr RADHA Hemphill's cell for results. Dr Hemphill aware.

## 2019-02-15 ENCOUNTER — TELEPHONE (OUTPATIENT)
Dept: MAMMOGRAPHY | Facility: HOSPITAL | Age: 69
End: 2019-02-15

## 2019-02-15 LAB
CYTO UR: NORMAL
CYTO UR: NORMAL
LAB AP CASE REPORT: NORMAL
LAB AP CASE REPORT: NORMAL
LAB AP CLINICAL INFORMATION: NORMAL
LAB AP CLINICAL INFORMATION: NORMAL
LAB AP DIAGNOSIS COMMENT: NORMAL
LAB AP DIAGNOSIS COMMENT: NORMAL
PATH REPORT.FINAL DX SPEC: NORMAL
PATH REPORT.FINAL DX SPEC: NORMAL
PATH REPORT.GROSS SPEC: NORMAL
PATH REPORT.GROSS SPEC: NORMAL

## 2019-02-15 NOTE — TELEPHONE ENCOUNTER
Returned pt call; questions answered, support given. Encouraged to call back with further questions or concerns. Patient verbalized understanding.

## 2019-02-19 ENCOUNTER — TELEPHONE (OUTPATIENT)
Dept: MAMMOGRAPHY | Facility: HOSPITAL | Age: 69
End: 2019-02-19

## 2019-02-19 NOTE — TELEPHONE ENCOUNTER
Pt notified of pathology results and recommendations. Verbalizes understanding. Denies discomfort. Denies signs and symptoms of infection. Pt desires to research surgeon choice. Pt is to call back with decision; an appointment will then be scheduled. Verbalizes understanding.

## 2019-02-20 ENCOUNTER — TELEPHONE (OUTPATIENT)
Dept: MAMMOGRAPHY | Facility: HOSPITAL | Age: 69
End: 2019-02-20

## 2019-02-20 NOTE — TELEPHONE ENCOUNTER
Spoke with pt to notify her Dr Hemphill recommends the pt have surgical consult sooner than in a month that pt requests appointment. Verbalized understanding.

## 2019-03-18 ENCOUNTER — TRANSCRIBE ORDERS (OUTPATIENT)
Dept: MAMMOGRAPHY | Facility: HOSPITAL | Age: 69
End: 2019-03-18

## 2019-03-18 ENCOUNTER — TRANSCRIBE ORDERS (OUTPATIENT)
Dept: ADMINISTRATIVE | Facility: HOSPITAL | Age: 69
End: 2019-03-18

## 2019-03-18 DIAGNOSIS — N60.91 BENIGN MAMMARY DYSPLASIA OF RIGHT BREAST: Primary | ICD-10-CM

## 2019-03-18 DIAGNOSIS — N63.25 BREAST LUMP ON LEFT SIDE AT 12 O'CLOCK POSITION: Primary | ICD-10-CM

## 2019-03-18 DIAGNOSIS — N60.91 ATYPICAL DUCTAL HYPERPLASIA OF RIGHT BREAST: ICD-10-CM

## 2019-03-18 DIAGNOSIS — N63.20 BREAST MASS, LEFT: ICD-10-CM

## 2019-03-28 ENCOUNTER — APPOINTMENT (OUTPATIENT)
Dept: PREADMISSION TESTING | Facility: HOSPITAL | Age: 69
End: 2019-03-28

## 2019-03-28 VITALS — WEIGHT: 179.23 LBS | BODY MASS INDEX: 30.6 KG/M2 | HEIGHT: 64 IN

## 2019-03-28 LAB
ANION GAP SERPL CALCULATED.3IONS-SCNC: 7 MMOL/L (ref 3–11)
BUN BLD-MCNC: 12 MG/DL (ref 9–23)
BUN/CREAT SERPL: 16.2 (ref 7–25)
CALCIUM SPEC-SCNC: 8.8 MG/DL (ref 8.7–10.4)
CHLORIDE SERPL-SCNC: 105 MMOL/L (ref 99–109)
CO2 SERPL-SCNC: 28 MMOL/L (ref 20–31)
CREAT BLD-MCNC: 0.74 MG/DL (ref 0.6–1.3)
DEPRECATED RDW RBC AUTO: 45.9 FL (ref 37–54)
ERYTHROCYTE [DISTWIDTH] IN BLOOD BY AUTOMATED COUNT: 13.7 % (ref 11.3–14.5)
GFR SERPL CREATININE-BSD FRML MDRD: 78 ML/MIN/1.73
GLUCOSE BLD-MCNC: 123 MG/DL (ref 70–100)
HCT VFR BLD AUTO: 41.2 % (ref 34.5–44)
HGB BLD-MCNC: 13.3 G/DL (ref 11.5–15.5)
INR PPP: 3.26 (ref 0.85–1.16)
MCH RBC QN AUTO: 29.8 PG (ref 27–31)
MCHC RBC AUTO-ENTMCNC: 32.3 G/DL (ref 32–36)
MCV RBC AUTO: 92.4 FL (ref 80–99)
PLATELET # BLD AUTO: 276 10*3/MM3 (ref 150–450)
PMV BLD AUTO: 10.3 FL (ref 6–12)
POTASSIUM BLD-SCNC: 3.8 MMOL/L (ref 3.5–5.5)
PROTHROMBIN TIME: 32 SECONDS (ref 11.2–14.3)
RBC # BLD AUTO: 4.46 10*6/MM3 (ref 3.89–5.14)
SODIUM BLD-SCNC: 140 MMOL/L (ref 132–146)
WBC NRBC COR # BLD: 5.26 10*3/MM3 (ref 3.5–10.8)

## 2019-03-28 PROCEDURE — 36415 COLL VENOUS BLD VENIPUNCTURE: CPT

## 2019-03-28 PROCEDURE — 80048 BASIC METABOLIC PNL TOTAL CA: CPT | Performed by: SURGERY

## 2019-03-28 PROCEDURE — 85610 PROTHROMBIN TIME: CPT | Performed by: ANESTHESIOLOGY

## 2019-03-28 PROCEDURE — 93010 ELECTROCARDIOGRAM REPORT: CPT | Performed by: INTERNAL MEDICINE

## 2019-03-28 PROCEDURE — 93005 ELECTROCARDIOGRAM TRACING: CPT

## 2019-03-28 PROCEDURE — 85027 COMPLETE CBC AUTOMATED: CPT | Performed by: SURGERY

## 2019-03-28 RX ORDER — OLANZAPINE 2.5 MG/1
0.62 TABLET ORAL NIGHTLY
COMMUNITY

## 2019-03-28 RX ORDER — CLONAZEPAM 0.5 MG/1
0.5 TABLET ORAL NIGHTLY
COMMUNITY

## 2019-03-28 RX ORDER — PAROXETINE HYDROCHLORIDE 20 MG/1
20 TABLET, FILM COATED ORAL DAILY
COMMUNITY

## 2019-03-28 RX ORDER — OMEPRAZOLE 20 MG/1
20 CAPSULE, DELAYED RELEASE ORAL DAILY
COMMUNITY
End: 2019-11-18 | Stop reason: SDUPTHER

## 2019-03-28 RX ORDER — WARFARIN SODIUM 7.5 MG/1
7.5 TABLET ORAL NIGHTLY
COMMUNITY
End: 2019-10-18 | Stop reason: SDUPTHER

## 2019-04-01 ENCOUNTER — ANESTHESIA EVENT (OUTPATIENT)
Dept: PERIOP | Facility: HOSPITAL | Age: 69
End: 2019-04-01

## 2019-04-02 ENCOUNTER — HOSPITAL ENCOUNTER (OUTPATIENT)
Dept: MAMMOGRAPHY | Facility: HOSPITAL | Age: 69
Discharge: HOME | End: 2019-04-02

## 2019-04-02 ENCOUNTER — HOSPITAL ENCOUNTER (OUTPATIENT)
Dept: MAMMOGRAPHY | Facility: HOSPITAL | Age: 69
Discharge: HOME | End: 2019-04-02
Admitting: SURGERY

## 2019-04-02 ENCOUNTER — HOSPITAL ENCOUNTER (OUTPATIENT)
Facility: HOSPITAL | Age: 69
Setting detail: HOSPITAL OUTPATIENT SURGERY
Discharge: HOME | End: 2019-04-02
Attending: SURGERY | Admitting: SURGERY

## 2019-04-02 ENCOUNTER — ANESTHESIA (OUTPATIENT)
Dept: PERIOP | Facility: HOSPITAL | Age: 69
End: 2019-04-02

## 2019-04-02 VITALS
HEART RATE: 87 BPM | SYSTOLIC BLOOD PRESSURE: 145 MMHG | TEMPERATURE: 97.6 F | WEIGHT: 179.23 LBS | OXYGEN SATURATION: 96 % | HEIGHT: 64 IN | BODY MASS INDEX: 30.6 KG/M2 | RESPIRATION RATE: 16 BRPM | DIASTOLIC BLOOD PRESSURE: 87 MMHG

## 2019-04-02 DIAGNOSIS — N60.91 ATYPICAL DUCTAL HYPERPLASIA OF RIGHT BREAST: ICD-10-CM

## 2019-04-02 DIAGNOSIS — D36.9 PAPILLOMA: ICD-10-CM

## 2019-04-02 DIAGNOSIS — N63.25 BREAST LUMP ON LEFT SIDE AT 12 O'CLOCK POSITION: ICD-10-CM

## 2019-04-02 DIAGNOSIS — B17.9: ICD-10-CM

## 2019-04-02 LAB
INR PPP: 0.99 (ref 0.85–1.16)
PROTHROMBIN TIME: 12.6 SECONDS (ref 11.2–14.3)

## 2019-04-02 PROCEDURE — 88342 IMHCHEM/IMCYTCHM 1ST ANTB: CPT | Performed by: SURGERY

## 2019-04-02 PROCEDURE — 88307 TISSUE EXAM BY PATHOLOGIST: CPT | Performed by: SURGERY

## 2019-04-02 PROCEDURE — 25010000003 LIDOCAINE 1 % SOLUTION: Performed by: RADIOLOGY

## 2019-04-02 PROCEDURE — 76098 X-RAY EXAM SURGICAL SPECIMEN: CPT

## 2019-04-02 PROCEDURE — 88305 TISSUE EXAM BY PATHOLOGIST: CPT | Performed by: SURGERY

## 2019-04-02 PROCEDURE — 88360 TUMOR IMMUNOHISTOCHEM/MANUAL: CPT | Performed by: SURGERY

## 2019-04-02 PROCEDURE — 25010000002 DEXAMETHASONE PER 1 MG: Performed by: NURSE ANESTHETIST, CERTIFIED REGISTERED

## 2019-04-02 PROCEDURE — 19282 PERQ DEVICE BREAST EA IMAG: CPT | Performed by: RADIOLOGY

## 2019-04-02 PROCEDURE — 76098 X-RAY EXAM SURGICAL SPECIMEN: CPT | Performed by: RADIOLOGY

## 2019-04-02 PROCEDURE — 19281 PERQ DEVICE BREAST 1ST IMAG: CPT | Performed by: RADIOLOGY

## 2019-04-02 PROCEDURE — 88341 IMHCHEM/IMCYTCHM EA ADD ANTB: CPT | Performed by: SURGERY

## 2019-04-02 PROCEDURE — 25010000002 PHENYLEPHRINE PER 1 ML: Performed by: NURSE ANESTHETIST, CERTIFIED REGISTERED

## 2019-04-02 PROCEDURE — 25010000002 ONDANSETRON PER 1 MG: Performed by: NURSE ANESTHETIST, CERTIFIED REGISTERED

## 2019-04-02 PROCEDURE — 25010000002 FENTANYL CITRATE (PF) 100 MCG/2ML SOLUTION: Performed by: NURSE ANESTHETIST, CERTIFIED REGISTERED

## 2019-04-02 PROCEDURE — 25010000002 PROPOFOL 10 MG/ML EMULSION: Performed by: NURSE ANESTHETIST, CERTIFIED REGISTERED

## 2019-04-02 PROCEDURE — 85610 PROTHROMBIN TIME: CPT | Performed by: SURGERY

## 2019-04-02 RX ORDER — SODIUM CHLORIDE, SODIUM LACTATE, POTASSIUM CHLORIDE, CALCIUM CHLORIDE 600; 310; 30; 20 MG/100ML; MG/100ML; MG/100ML; MG/100ML
9 INJECTION, SOLUTION INTRAVENOUS CONTINUOUS PRN
Status: DISCONTINUED | OUTPATIENT
Start: 2019-04-02 | End: 2019-04-02 | Stop reason: HOSPADM

## 2019-04-02 RX ORDER — SODIUM CHLORIDE 0.9 % (FLUSH) 0.9 %
3 SYRINGE (ML) INJECTION EVERY 12 HOURS SCHEDULED
Status: DISCONTINUED | OUTPATIENT
Start: 2019-04-02 | End: 2019-04-02 | Stop reason: HOSPADM

## 2019-04-02 RX ORDER — FENTANYL CITRATE 50 UG/ML
50 INJECTION, SOLUTION INTRAMUSCULAR; INTRAVENOUS
Status: DISCONTINUED | OUTPATIENT
Start: 2019-04-02 | End: 2019-04-02 | Stop reason: HOSPADM

## 2019-04-02 RX ORDER — PROMETHAZINE HYDROCHLORIDE 25 MG/1
25 TABLET ORAL ONCE AS NEEDED
Status: DISCONTINUED | OUTPATIENT
Start: 2019-04-02 | End: 2019-04-02 | Stop reason: HOSPADM

## 2019-04-02 RX ORDER — BUPIVACAINE HYDROCHLORIDE 5 MG/ML
INJECTION, SOLUTION EPIDURAL; INTRACAUDAL AS NEEDED
Status: DISCONTINUED | OUTPATIENT
Start: 2019-04-02 | End: 2019-04-02 | Stop reason: HOSPADM

## 2019-04-02 RX ORDER — IPRATROPIUM BROMIDE AND ALBUTEROL SULFATE 2.5; .5 MG/3ML; MG/3ML
3 SOLUTION RESPIRATORY (INHALATION) ONCE AS NEEDED
Status: DISCONTINUED | OUTPATIENT
Start: 2019-04-02 | End: 2019-04-02 | Stop reason: HOSPADM

## 2019-04-02 RX ORDER — LIDOCAINE HYDROCHLORIDE 10 MG/ML
0.5 INJECTION, SOLUTION EPIDURAL; INFILTRATION; INTRACAUDAL; PERINEURAL ONCE AS NEEDED
Status: COMPLETED | OUTPATIENT
Start: 2019-04-02 | End: 2019-04-02

## 2019-04-02 RX ORDER — FAMOTIDINE 20 MG/1
20 TABLET, FILM COATED ORAL
Status: DISCONTINUED | OUTPATIENT
Start: 2019-04-02 | End: 2019-04-02 | Stop reason: HOSPADM

## 2019-04-02 RX ORDER — BUPIVACAINE HYDROCHLORIDE 5 MG/ML
INJECTION, SOLUTION PERINEURAL AS NEEDED
Status: DISCONTINUED | OUTPATIENT
Start: 2019-04-02 | End: 2019-04-02 | Stop reason: HOSPADM

## 2019-04-02 RX ORDER — PROMETHAZINE HYDROCHLORIDE 25 MG/ML
6.25 INJECTION, SOLUTION INTRAMUSCULAR; INTRAVENOUS ONCE AS NEEDED
Status: DISCONTINUED | OUTPATIENT
Start: 2019-04-02 | End: 2019-04-02 | Stop reason: HOSPADM

## 2019-04-02 RX ORDER — FENTANYL CITRATE 50 UG/ML
INJECTION, SOLUTION INTRAMUSCULAR; INTRAVENOUS AS NEEDED
Status: DISCONTINUED | OUTPATIENT
Start: 2019-04-02 | End: 2019-04-02 | Stop reason: SURG

## 2019-04-02 RX ORDER — MAGNESIUM HYDROXIDE 1200 MG/15ML
LIQUID ORAL AS NEEDED
Status: DISCONTINUED | OUTPATIENT
Start: 2019-04-02 | End: 2019-04-02 | Stop reason: HOSPADM

## 2019-04-02 RX ORDER — LIDOCAINE HYDROCHLORIDE 10 MG/ML
5 INJECTION, SOLUTION INFILTRATION; PERINEURAL ONCE
Status: DISCONTINUED | OUTPATIENT
Start: 2019-04-02 | End: 2019-04-02 | Stop reason: HOSPADM

## 2019-04-02 RX ORDER — LIDOCAINE HYDROCHLORIDE 10 MG/ML
INJECTION, SOLUTION EPIDURAL; INFILTRATION; INTRACAUDAL; PERINEURAL AS NEEDED
Status: DISCONTINUED | OUTPATIENT
Start: 2019-04-02 | End: 2019-04-02 | Stop reason: SURG

## 2019-04-02 RX ORDER — PROMETHAZINE HYDROCHLORIDE 25 MG/1
25 SUPPOSITORY RECTAL ONCE AS NEEDED
Status: DISCONTINUED | OUTPATIENT
Start: 2019-04-02 | End: 2019-04-02 | Stop reason: HOSPADM

## 2019-04-02 RX ORDER — PROPOFOL 10 MG/ML
VIAL (ML) INTRAVENOUS AS NEEDED
Status: DISCONTINUED | OUTPATIENT
Start: 2019-04-02 | End: 2019-04-02 | Stop reason: SURG

## 2019-04-02 RX ORDER — DEXAMETHASONE SODIUM PHOSPHATE 4 MG/ML
INJECTION, SOLUTION INTRA-ARTICULAR; INTRALESIONAL; INTRAMUSCULAR; INTRAVENOUS; SOFT TISSUE AS NEEDED
Status: DISCONTINUED | OUTPATIENT
Start: 2019-04-02 | End: 2019-04-02 | Stop reason: SURG

## 2019-04-02 RX ORDER — SODIUM CHLORIDE 0.9 % (FLUSH) 0.9 %
1-10 SYRINGE (ML) INJECTION AS NEEDED
Status: DISCONTINUED | OUTPATIENT
Start: 2019-04-02 | End: 2019-04-02 | Stop reason: HOSPADM

## 2019-04-02 RX ORDER — ONDANSETRON 2 MG/ML
INJECTION INTRAMUSCULAR; INTRAVENOUS AS NEEDED
Status: DISCONTINUED | OUTPATIENT
Start: 2019-04-02 | End: 2019-04-02 | Stop reason: SURG

## 2019-04-02 RX ADMIN — FENTANYL CITRATE 25 MCG: 50 INJECTION, SOLUTION INTRAMUSCULAR; INTRAVENOUS at 13:27

## 2019-04-02 RX ADMIN — FENTANYL CITRATE 50 MCG: 50 INJECTION, SOLUTION INTRAMUSCULAR; INTRAVENOUS at 13:43

## 2019-04-02 RX ADMIN — METHYLENE BLUE 1 ML: 10 INJECTION INTRAVENOUS at 09:37

## 2019-04-02 RX ADMIN — SODIUM CHLORIDE, POTASSIUM CHLORIDE, SODIUM LACTATE AND CALCIUM CHLORIDE: 600; 310; 30; 20 INJECTION, SOLUTION INTRAVENOUS at 13:55

## 2019-04-02 RX ADMIN — LIDOCAINE HYDROCHLORIDE 3 ML: 10 INJECTION, SOLUTION INFILTRATION; PERINEURAL at 09:38

## 2019-04-02 RX ADMIN — PHENYLEPHRINE HYDROCHLORIDE 80 MCG: 10 INJECTION INTRAVENOUS at 13:23

## 2019-04-02 RX ADMIN — FENTANYL CITRATE 25 MCG: 50 INJECTION, SOLUTION INTRAMUSCULAR; INTRAVENOUS at 13:38

## 2019-04-02 RX ADMIN — PHENYLEPHRINE HYDROCHLORIDE 80 MCG: 10 INJECTION INTRAVENOUS at 14:10

## 2019-04-02 RX ADMIN — PROPOFOL 200 MG: 10 INJECTION, EMULSION INTRAVENOUS at 13:17

## 2019-04-02 RX ADMIN — EPHEDRINE SULFATE 10 MG: 50 INJECTION INTRAMUSCULAR; INTRAVENOUS; SUBCUTANEOUS at 13:27

## 2019-04-02 RX ADMIN — SODIUM CHLORIDE, POTASSIUM CHLORIDE, SODIUM LACTATE AND CALCIUM CHLORIDE 9 ML/HR: 600; 310; 30; 20 INJECTION, SOLUTION INTRAVENOUS at 10:44

## 2019-04-02 RX ADMIN — ONDANSETRON 4 MG: 2 INJECTION INTRAMUSCULAR; INTRAVENOUS at 13:59

## 2019-04-02 RX ADMIN — LIDOCAINE HYDROCHLORIDE 0.5 ML: 10 INJECTION, SOLUTION EPIDURAL; INFILTRATION; INTRACAUDAL; PERINEURAL at 10:45

## 2019-04-02 RX ADMIN — METHYLENE BLUE 1 ML: 10 INJECTION INTRAVENOUS at 09:39

## 2019-04-02 RX ADMIN — EPHEDRINE SULFATE 10 MG: 50 INJECTION INTRAMUSCULAR; INTRAVENOUS; SUBCUTANEOUS at 13:45

## 2019-04-02 RX ADMIN — DEXAMETHASONE SODIUM PHOSPHATE 8 MG: 4 INJECTION, SOLUTION INTRAMUSCULAR; INTRAVENOUS at 13:20

## 2019-04-02 RX ADMIN — LIDOCAINE HYDROCHLORIDE 2 ML: 10 INJECTION, SOLUTION INFILTRATION; PERINEURAL at 09:36

## 2019-04-02 RX ADMIN — LIDOCAINE HYDROCHLORIDE 50 MG: 10 INJECTION, SOLUTION EPIDURAL; INFILTRATION; INTRACAUDAL; PERINEURAL at 13:17

## 2019-04-02 NOTE — ANESTHESIA PROCEDURE NOTES
Airway  Urgency: elective    Airway not difficult    General Information and Staff    Patient location during procedure: OR  CRNA: Dante Jay CRNA    Indications and Patient Condition  Indications for airway management: airway protection    Preoxygenated: yes  Mask difficulty assessment: 1 - vent by mask    Final Airway Details  Final airway type: supraglottic airway      Successful airway: I-gel  Size 4    Number of attempts at approach: 1    Additional Comments  LMA placed without difficulty, ventilation with assist, equal breath sounds and symmetric chest rise and fall. Atraumatic, dentition and mucosa unchnaged

## 2019-04-02 NOTE — ANESTHESIA POSTPROCEDURE EVALUATION
Patient: Bailey Armstrong    Procedure Summary     Date:  04/02/19 Room / Location:   [facility]    Anesthesia Start:  1315 Anesthesia Stop:      Procedure:  WIRE LOCALIZED EXCISIONAL BIOPSY OF ATYPICAL DUCTAL HYPERPLASIA ,AND WIRE LOCALIZED EXCISIONAL LEFT BREAST PAPILLOMA (Bilateral Breast) Diagnosis:  Atypical ductal hyperplasia of right breast    Surgeon:  Sita Ramon MD Provider:  Brenden Kemp MD    Anesthesia Type:  general ASA Status:  3          Anesthesia Type: general  Last vitals  BP   154/97   Temp   97.5   Pulse   92 (04/02/19 1425)   Resp   16 (04/02/19 1425)     SpO2   96 % (04/02/19 1425)     Post Anesthesia Care and Evaluation    Patient location during evaluation: PACU  Patient participation: complete - patient participated  Level of consciousness: awake and alert  Pain score: 0  Pain management: adequate  Airway patency: patent  Anesthetic complications: No anesthetic complications  PONV Status: none  Cardiovascular status: hemodynamically stable and acceptable  Respiratory status: nonlabored ventilation, acceptable and nasal cannula  Hydration status: acceptable

## 2019-04-02 NOTE — ANESTHESIA PREPROCEDURE EVALUATION
Anesthesia Evaluation     Patient summary reviewed and Nursing notes reviewed   history of anesthetic complications:  NPO Solid Status: > 8 hours  NPO Liquid Status: > 4 hours           Airway   Mallampati: II  TM distance: >3 FB  Neck ROM: full  No difficulty expected  Dental      Pulmonary    (+) pulmonary embolism (2016 c Pulm HTN ),   (-) COPD, shortness of breath, recent URI, not a smoker  Cardiovascular     ECG reviewed    (+) hypertension, DVT (IVC filter ) resolved,   (-) past MI, CAD, dysrhythmias, angina, cardiac stents    ROS comment: EKG NSR     Neuro/Psych  (+) headaches, psychiatric history (klonopin paxil zyprexa),     (-) seizures, CVA  GI/Hepatic/Renal/Endo    (+)  GERD,    (-) liver disease, no renal disease, diabetes, hypothyroidism    Musculoskeletal     Abdominal    Substance History      OB/GYN          Other        ROS/Med Hx Other: Warfarin lovenox bridge                 Anesthesia Plan    ASA 3     general     intravenous induction   Anesthetic plan, all risks, benefits, and alternatives have been provided, discussed and informed consent has been obtained with: patient.    Plan discussed with CRNA.

## 2019-04-08 ENCOUNTER — TRANSCRIBE ORDERS (OUTPATIENT)
Dept: LAB | Facility: HOSPITAL | Age: 69
End: 2019-04-08

## 2019-04-08 ENCOUNTER — LAB (OUTPATIENT)
Dept: LAB | Facility: HOSPITAL | Age: 69
End: 2019-04-08

## 2019-04-08 DIAGNOSIS — N60.91 BENIGN MAMMARY DYSPLASIA OF RIGHT BREAST: ICD-10-CM

## 2019-04-08 DIAGNOSIS — N60.91 BENIGN MAMMARY DYSPLASIA OF RIGHT BREAST: Primary | ICD-10-CM

## 2019-04-08 LAB
CYTO UR: NORMAL
INR PPP: 1.68 (ref 0.85–1.16)
LAB AP CASE REPORT: NORMAL
LAB AP CLINICAL INFORMATION: NORMAL
LAB AP DIAGNOSIS COMMENT: NORMAL
LAB AP SPECIAL STAINS: NORMAL
PATH REPORT.FINAL DX SPEC: NORMAL
PATH REPORT.GROSS SPEC: NORMAL
PROTHROMBIN TIME: 19 SECONDS (ref 11.2–14.3)

## 2019-04-08 PROCEDURE — 85610 PROTHROMBIN TIME: CPT

## 2019-04-08 PROCEDURE — 36415 COLL VENOUS BLD VENIPUNCTURE: CPT

## 2019-04-09 ENCOUNTER — ANESTHESIA (OUTPATIENT)
Dept: PERIOP | Facility: HOSPITAL | Age: 69
End: 2019-04-09

## 2019-04-09 ENCOUNTER — HOSPITAL ENCOUNTER (OUTPATIENT)
Facility: HOSPITAL | Age: 69
Setting detail: HOSPITAL OUTPATIENT SURGERY
Discharge: HOME | End: 2019-04-09
Attending: SURGERY | Admitting: SURGERY

## 2019-04-09 ENCOUNTER — ANESTHESIA EVENT (OUTPATIENT)
Dept: PERIOP | Facility: HOSPITAL | Age: 69
End: 2019-04-09

## 2019-04-09 VITALS
OXYGEN SATURATION: 100 % | SYSTOLIC BLOOD PRESSURE: 109 MMHG | HEART RATE: 88 BPM | TEMPERATURE: 97.5 F | RESPIRATION RATE: 20 BRPM | DIASTOLIC BLOOD PRESSURE: 60 MMHG

## 2019-04-09 DIAGNOSIS — D05.10 DCIS (DUCTAL CARCINOMA IN SITU): ICD-10-CM

## 2019-04-09 LAB
APTT PPP: 43.5 SECONDS (ref 24–37)
BASOPHILS # BLD AUTO: 0.03 10*3/MM3 (ref 0–0.2)
BASOPHILS NFR BLD AUTO: 0.4 % (ref 0–1.5)
DEPRECATED RDW RBC AUTO: 46.9 FL (ref 37–54)
EOSINOPHIL # BLD AUTO: 0.31 10*3/MM3 (ref 0–0.4)
EOSINOPHIL NFR BLD AUTO: 4 % (ref 0.3–6.2)
ERYTHROCYTE [DISTWIDTH] IN BLOOD BY AUTOMATED COUNT: 13.9 % (ref 12.3–15.4)
HCT VFR BLD AUTO: 31.2 % (ref 34–46.6)
HGB BLD-MCNC: 10 G/DL (ref 12–15.9)
IMM GRANULOCYTES # BLD AUTO: 0.02 10*3/MM3 (ref 0–0.05)
IMM GRANULOCYTES NFR BLD AUTO: 0.3 % (ref 0–0.5)
LYMPHOCYTES # BLD AUTO: 1.76 10*3/MM3 (ref 0.7–3.1)
LYMPHOCYTES NFR BLD AUTO: 22.8 % (ref 19.6–45.3)
MCH RBC QN AUTO: 29.7 PG (ref 26.6–33)
MCHC RBC AUTO-ENTMCNC: 32.1 G/DL (ref 31.5–35.7)
MCV RBC AUTO: 92.6 FL (ref 79–97)
MONOCYTES # BLD AUTO: 0.79 10*3/MM3 (ref 0.1–0.9)
MONOCYTES NFR BLD AUTO: 10.2 % (ref 5–12)
NEUTROPHILS # BLD AUTO: 4.84 10*3/MM3 (ref 1.4–7)
NEUTROPHILS NFR BLD AUTO: 62.6 % (ref 42.7–76)
PLATELET # BLD AUTO: 297 10*3/MM3 (ref 140–450)
PMV BLD AUTO: 10.3 FL (ref 6–12)
RBC # BLD AUTO: 3.37 10*6/MM3 (ref 3.77–5.28)
WBC NRBC COR # BLD: 7.73 10*3/MM3 (ref 3.4–10.8)

## 2019-04-09 PROCEDURE — 25010000002 DEXAMETHASONE PER 1 MG: Performed by: NURSE ANESTHETIST, CERTIFIED REGISTERED

## 2019-04-09 PROCEDURE — 85025 COMPLETE CBC W/AUTO DIFF WBC: CPT | Performed by: SURGERY

## 2019-04-09 PROCEDURE — 25010000003 CEFAZOLIN IN DEXTROSE 2-4 GM/100ML-% SOLUTION: Performed by: SURGERY

## 2019-04-09 PROCEDURE — 88307 TISSUE EXAM BY PATHOLOGIST: CPT | Performed by: SURGERY

## 2019-04-09 PROCEDURE — 25010000002 FENTANYL CITRATE (PF) 100 MCG/2ML SOLUTION: Performed by: NURSE ANESTHETIST, CERTIFIED REGISTERED

## 2019-04-09 PROCEDURE — 25010000002 ONDANSETRON PER 1 MG: Performed by: NURSE ANESTHETIST, CERTIFIED REGISTERED

## 2019-04-09 PROCEDURE — 25010000002 PROPOFOL 10 MG/ML EMULSION: Performed by: NURSE ANESTHETIST, CERTIFIED REGISTERED

## 2019-04-09 PROCEDURE — 85730 THROMBOPLASTIN TIME PARTIAL: CPT | Performed by: SURGERY

## 2019-04-09 RX ORDER — SODIUM CHLORIDE 0.9 % (FLUSH) 0.9 %
3-10 SYRINGE (ML) INJECTION AS NEEDED
Status: DISCONTINUED | OUTPATIENT
Start: 2019-04-09 | End: 2019-04-09 | Stop reason: HOSPADM

## 2019-04-09 RX ORDER — FENTANYL CITRATE 50 UG/ML
INJECTION, SOLUTION INTRAMUSCULAR; INTRAVENOUS AS NEEDED
Status: DISCONTINUED | OUTPATIENT
Start: 2019-04-09 | End: 2019-04-09 | Stop reason: SURG

## 2019-04-09 RX ORDER — ONDANSETRON 2 MG/ML
INJECTION INTRAMUSCULAR; INTRAVENOUS AS NEEDED
Status: DISCONTINUED | OUTPATIENT
Start: 2019-04-09 | End: 2019-04-09 | Stop reason: SURG

## 2019-04-09 RX ORDER — SODIUM CHLORIDE, SODIUM LACTATE, POTASSIUM CHLORIDE, CALCIUM CHLORIDE 600; 310; 30; 20 MG/100ML; MG/100ML; MG/100ML; MG/100ML
9 INJECTION, SOLUTION INTRAVENOUS CONTINUOUS
Status: DISCONTINUED | OUTPATIENT
Start: 2019-04-09 | End: 2019-04-09 | Stop reason: HOSPADM

## 2019-04-09 RX ORDER — SODIUM CHLORIDE 0.9 % (FLUSH) 0.9 %
3 SYRINGE (ML) INJECTION EVERY 12 HOURS SCHEDULED
Status: DISCONTINUED | OUTPATIENT
Start: 2019-04-09 | End: 2019-04-09 | Stop reason: HOSPADM

## 2019-04-09 RX ORDER — FENTANYL CITRATE 50 UG/ML
50 INJECTION, SOLUTION INTRAMUSCULAR; INTRAVENOUS
Status: DISCONTINUED | OUTPATIENT
Start: 2019-04-09 | End: 2019-04-09 | Stop reason: HOSPADM

## 2019-04-09 RX ORDER — HYDROMORPHONE HYDROCHLORIDE 1 MG/ML
0.5 INJECTION, SOLUTION INTRAMUSCULAR; INTRAVENOUS; SUBCUTANEOUS
Status: DISCONTINUED | OUTPATIENT
Start: 2019-04-09 | End: 2019-04-09 | Stop reason: HOSPADM

## 2019-04-09 RX ORDER — PROMETHAZINE HYDROCHLORIDE 25 MG/1
25 SUPPOSITORY RECTAL ONCE AS NEEDED
Status: DISCONTINUED | OUTPATIENT
Start: 2019-04-09 | End: 2019-04-09 | Stop reason: HOSPADM

## 2019-04-09 RX ORDER — CEFAZOLIN SODIUM 2 G/100ML
2 INJECTION, SOLUTION INTRAVENOUS ONCE
Status: COMPLETED | OUTPATIENT
Start: 2019-04-09 | End: 2019-04-09

## 2019-04-09 RX ORDER — LIDOCAINE HYDROCHLORIDE 10 MG/ML
0.5 INJECTION, SOLUTION EPIDURAL; INFILTRATION; INTRACAUDAL; PERINEURAL ONCE AS NEEDED
Status: COMPLETED | OUTPATIENT
Start: 2019-04-09 | End: 2019-04-09

## 2019-04-09 RX ORDER — PROMETHAZINE HYDROCHLORIDE 25 MG/1
25 TABLET ORAL ONCE AS NEEDED
Status: DISCONTINUED | OUTPATIENT
Start: 2019-04-09 | End: 2019-04-09 | Stop reason: HOSPADM

## 2019-04-09 RX ORDER — BUPIVACAINE HYDROCHLORIDE 2.5 MG/ML
INJECTION, SOLUTION EPIDURAL; INFILTRATION; INTRACAUDAL AS NEEDED
Status: DISCONTINUED | OUTPATIENT
Start: 2019-04-09 | End: 2019-04-09 | Stop reason: HOSPADM

## 2019-04-09 RX ORDER — PROMETHAZINE HYDROCHLORIDE 25 MG/ML
6.25 INJECTION, SOLUTION INTRAMUSCULAR; INTRAVENOUS ONCE AS NEEDED
Status: DISCONTINUED | OUTPATIENT
Start: 2019-04-09 | End: 2019-04-09 | Stop reason: HOSPADM

## 2019-04-09 RX ORDER — ONDANSETRON 2 MG/ML
4 INJECTION INTRAMUSCULAR; INTRAVENOUS ONCE AS NEEDED
Status: DISCONTINUED | OUTPATIENT
Start: 2019-04-09 | End: 2019-04-09 | Stop reason: HOSPADM

## 2019-04-09 RX ORDER — MAGNESIUM HYDROXIDE 1200 MG/15ML
LIQUID ORAL AS NEEDED
Status: DISCONTINUED | OUTPATIENT
Start: 2019-04-09 | End: 2019-04-09 | Stop reason: HOSPADM

## 2019-04-09 RX ORDER — FAMOTIDINE 10 MG/ML
20 INJECTION, SOLUTION INTRAVENOUS ONCE
Status: CANCELLED | OUTPATIENT
Start: 2019-04-09 | End: 2019-04-09

## 2019-04-09 RX ORDER — FAMOTIDINE 20 MG/1
20 TABLET, FILM COATED ORAL ONCE
Status: COMPLETED | OUTPATIENT
Start: 2019-04-09 | End: 2019-04-09

## 2019-04-09 RX ORDER — PROPOFOL 10 MG/ML
VIAL (ML) INTRAVENOUS AS NEEDED
Status: DISCONTINUED | OUTPATIENT
Start: 2019-04-09 | End: 2019-04-09 | Stop reason: SURG

## 2019-04-09 RX ORDER — DEXAMETHASONE SODIUM PHOSPHATE 4 MG/ML
INJECTION, SOLUTION INTRA-ARTICULAR; INTRALESIONAL; INTRAMUSCULAR; INTRAVENOUS; SOFT TISSUE AS NEEDED
Status: DISCONTINUED | OUTPATIENT
Start: 2019-04-09 | End: 2019-04-09 | Stop reason: SURG

## 2019-04-09 RX ORDER — LIDOCAINE HYDROCHLORIDE 10 MG/ML
INJECTION, SOLUTION EPIDURAL; INFILTRATION; INTRACAUDAL; PERINEURAL AS NEEDED
Status: DISCONTINUED | OUTPATIENT
Start: 2019-04-09 | End: 2019-04-09 | Stop reason: SURG

## 2019-04-09 RX ORDER — CEPHALEXIN 500 MG/1
1000 CAPSULE ORAL 2 TIMES DAILY
COMMUNITY
End: 2019-05-09

## 2019-04-09 RX ADMIN — EPHEDRINE SULFATE 5 MG: 50 INJECTION INTRAMUSCULAR; INTRAVENOUS; SUBCUTANEOUS at 15:50

## 2019-04-09 RX ADMIN — FENTANYL CITRATE 50 MCG: 50 INJECTION, SOLUTION INTRAMUSCULAR; INTRAVENOUS at 15:47

## 2019-04-09 RX ADMIN — FENTANYL CITRATE 50 MCG: 50 INJECTION, SOLUTION INTRAMUSCULAR; INTRAVENOUS at 15:31

## 2019-04-09 RX ADMIN — LIDOCAINE HYDROCHLORIDE 50 MG: 10 INJECTION, SOLUTION EPIDURAL; INFILTRATION; INTRACAUDAL; PERINEURAL at 15:29

## 2019-04-09 RX ADMIN — LIDOCAINE HYDROCHLORIDE 0.5 ML: 10 INJECTION, SOLUTION EPIDURAL; INFILTRATION; INTRACAUDAL; PERINEURAL at 14:27

## 2019-04-09 RX ADMIN — ONDANSETRON 4 MG: 2 INJECTION INTRAMUSCULAR; INTRAVENOUS at 16:01

## 2019-04-09 RX ADMIN — FAMOTIDINE 20 MG: 20 TABLET ORAL at 14:27

## 2019-04-09 RX ADMIN — PROPOFOL 200 MG: 10 INJECTION, EMULSION INTRAVENOUS at 15:31

## 2019-04-09 RX ADMIN — EPHEDRINE SULFATE 10 MG: 50 INJECTION INTRAMUSCULAR; INTRAVENOUS; SUBCUTANEOUS at 15:35

## 2019-04-09 RX ADMIN — DEXAMETHASONE SODIUM PHOSPHATE 8 MG: 4 INJECTION, SOLUTION INTRAMUSCULAR; INTRAVENOUS at 15:31

## 2019-04-09 RX ADMIN — CEFAZOLIN SODIUM 2 G: 2 INJECTION, SOLUTION INTRAVENOUS at 15:29

## 2019-04-09 RX ADMIN — SODIUM CHLORIDE, POTASSIUM CHLORIDE, SODIUM LACTATE AND CALCIUM CHLORIDE 9 ML/HR: 600; 310; 30; 20 INJECTION, SOLUTION INTRAVENOUS at 14:27

## 2019-04-09 NOTE — BRIEF OP NOTE
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      A Breast, Right Tissue · TISSUE PATHOLOGY EXAM   Sita Ramon MD 4/9/19 1548 No     Description: SUTURES MEDIAL MARGIN ON SIDE OF TUMOR                  Complications: none      Sita Ramon MD     Date: 4/9/2019  Time: 4:14 PM

## 2019-04-09 NOTE — ANESTHESIA POSTPROCEDURE EVALUATION
Patient: Bailey Armstrong    Procedure Summary     Date:  04/09/19 Room / Location:   [facility]    Anesthesia Start:  1529 Anesthesia Stop:  1620    Procedure:  RE-EXCISION OF RIGHT LUMPECTOMY SITE (Right Breast) Diagnosis:  Ductal carcinoma in situ of right breast    Surgeon:  Sita Ramon MD Provider:  Desean Kilpatrick MD    Anesthesia Type:  general ASA Status:  3          Anesthesia Type: general  Last vitals  BP   116/76 (04/09/19 1400)   Temp   99.6 °F (37.6 °C) (04/09/19 1400)   Pulse   82 (04/09/19 1400)   Resp   14 (04/09/19 1400)     SpO2   100 % (04/09/19 1400)     Post Anesthesia Care and Evaluation    Patient location during evaluation: PACU  Patient participation: complete - patient participated  Level of consciousness: awake and alert  Pain score: 0  Pain management: adequate  Airway patency: patent  Anesthetic complications: No anesthetic complications  PONV Status: none  Cardiovascular status: hemodynamically stable and acceptable  Respiratory status: nonlabored ventilation, acceptable and nasal cannula  Hydration status: acceptable

## 2019-04-09 NOTE — ANESTHESIA PROCEDURE NOTES
Airway  Urgency: elective    Airway not difficult    General Information and Staff    Patient location during procedure: OR  CRNA: Amanda Good CRNA    Indications and Patient Condition  Indications for airway management: airway protection    Preoxygenated: yes  Mask difficulty assessment: 1 - vent by mask    Final Airway Details  Final airway type: supraglottic airway      Successful airway: I-gel  Size 4    Number of attempts at approach: 1    Additional Comments  LMA placed without difficulty, ventilation with assist, equal breath sounds and symmetric chest rise and fall

## 2019-04-09 NOTE — INTERVAL H&P NOTE
Pre-Op H&P (See Recent Office Note Attached for Full H&P)    Review of Systems:  General ROS:  no fever, chills, rashes, No change since last office visit  Cardiovascular ROS: no chest pain or dyspnea on exertion  Respiratory ROS: no cough, shortness of breath, or wheezing    Meds:    No current facility-administered medications on file prior to encounter.      Current Outpatient Medications on File Prior to Encounter   Medication Sig Dispense Refill   • clonazePAM (KlonoPIN) 0.5 MG tablet Take 0.5 mg by mouth Every Night.     • Enoxaparin Sodium (LOVENOX SC) Inject  under the skin into the appropriate area as directed. Will start lovenox bridge 3/28/19 PM after stopping warfarin     • IRON PO Take  by mouth Daily.     • OLANZapine (zyPREXA) 2.5 MG tablet Take 0.625 mg by mouth Every Night.     • omeprazole (priLOSEC) 20 MG capsule Take 20 mg by mouth Daily.     • PARoxetine (PAXIL) 20 MG tablet Take 20 mg by mouth Daily.     • warfarin (COUMADIN) 7.5 MG tablet Take 7.5 mg by mouth Every Night.     Last dose Lovenox 4/8/19 PM and Coumadin last dose 4/7/19.  (On Lovenox/Coumadin bridge for history of PE's following last weeks bx to reach therapeutic INR, stopped yesterday in office in preparation for surgery today)  INR 4/8/19 1.68    Vital Signs:  /76 (BP Location: Right arm, Patient Position: Lying)   Pulse 82   Temp 99.6 °F (37.6 °C) (Temporal)   Resp 14   SpO2 100%     Physical Exam:    CV:  S1S2 regular rate and rhythm, no murmur               Resp:  Clear to auscultation; respirations regular, even and unlabored    Results Review:     Lab Results   Component Value Date    WBC 7.73 04/09/2019    HGB 10.0 (L) 04/09/2019    HCT 31.2 (L) 04/09/2019    MCV 92.6 04/09/2019     04/09/2019    NEUTROABS 4.84 04/09/2019    GLUCOSE 123 (H) 03/28/2019    BUN 12 03/28/2019    CREATININE 0.74 03/28/2019    EGFRIFNONA 78 03/28/2019     03/28/2019    K 3.8 03/28/2019     03/28/2019    CO2 28.0  03/28/2019    CALCIUM 8.8 03/28/2019        I reviewed the patient's new clinical results.    Cancer Staging (if applicable)  Cancer Patient: _x_ yes __no __unknown; If yes, clinical stage 0 T:_is_ N:_0_M:_0_,     Assessment/Plan:    Assessment:  Right breast DCIS s/p excisional biopsy 4/2/19 with positive anterior and medial margin and right breast hematoma/infection (on Keflex) s/p office aspiration of hematoma (25 ml) 4/8/19    Plan:    Wide excision of right lumpectomy site and evacuation of hematoma    Karmen Edwards, APRN  4/9/2019   2:31 PM

## 2019-04-09 NOTE — OP NOTE
Operative Note    Date of Surgery:  4/9/2019    Pre-Operative Diagnosis: Right breast ductal carcinoma in situ with involved anterior and medial margins    Post-Operative Diagnosis: Same    Procedure: Wide excision of right breast anterior and medial margins secondary to involved DCIS    Anesthesia: General    Surgeon:  Sita Ramon MD    Estimated Blood Loss: Very minimal    Findings: Hematoma appreciated    Complications: None      Indication for Procedure: Mrs. Armstrong is a 68 years old lady with history of pulmonary embolus who has been on chronic Coumadin.  She was diagnosed with right breast ADH and left breast papilloma.  She underwent wide excision of both of the sides following needle localization by radiology last week.  She has been off Coumadin and was bridged with Lovenox twice daily.  She developed a hematoma on the right with some redness.  Pathology also showed a 2.5 cm focus of low-grade DCIS on the right with involved anterior and medial margins.  She presents today for the above procedure.    Procedure: Patient was taken to the operating room by anesthesia and placed supine on the table.  Following induction of general anesthesia using LMA, SCDs were placed.  She received 2 g of Kefzol IV.  The right breast was then prepped and draped in a sterile fashion.  Timeout was observed.  Marcaine 0.5% plain was administered at the circumareolar site on the right and the incision was opened hematoma was noted there was suctioned and washed out.  We then proceeded by excising the medial margin as well as an anterior margin and marked the specimens with silk suture on the tumor site and send the separate to pathology for permanent section.  The wound was then irrigated with water.  It was noted to be under adequate hemostasis.  The subcutaneous tissue was then approximated with interrupted 3 Vicryl sutures and the skin incision was approximated with 4-0 Monocryl subcuticular suture.  Skin affix was  applied.  Once it dried we applied fluff gauze.  The patient tolerated procedure well with no complications.  She was extubated and taken to the recovery room in stable condition.  Sponge count and needle count were correct at the end of the procedure.    Sita Ramon MD  04/09/19  4:32 PM

## 2019-04-09 NOTE — ANESTHESIA PREPROCEDURE EVALUATION
Anesthesia Evaluation     Patient summary reviewed and Nursing notes reviewed   no history of anesthetic complications:  NPO Solid Status: > 8 hours  NPO Liquid Status: > 8 hours           Airway   Mallampati: II  TM distance: >3 FB  Neck ROM: full  No difficulty expected  Dental - normal exam     Pulmonary - negative pulmonary ROS and normal exam    breath sounds clear to auscultation  Cardiovascular - negative cardio ROS and normal exam    ECG reviewed  Rhythm: regular  Rate: normal      ROS comment: S/p sternotomy for manual embolectomy    Neuro/Psych- negative ROS  GI/Hepatic/Renal/Endo    (+) obesity,  GERD well controlled,      Musculoskeletal     Abdominal    Substance History      OB/GYN          Other   (+) arthritis                     Anesthesia Plan    ASA 3     general     intravenous induction   Anesthetic plan, all risks, benefits, and alternatives have been provided, discussed and informed consent has been obtained with: patient.    Plan discussed with CRNA.

## 2019-04-11 ENCOUNTER — DOCUMENTATION (OUTPATIENT)
Dept: ONCOLOGY | Facility: CLINIC | Age: 69
End: 2019-04-11

## 2019-04-11 NOTE — PROGRESS NOTES
I saw patient with Dr MARINA  - patient was alone - she asked that we only talk to her when we call not her  because he gets upset and doesn't understand. Dr MARINA reviewed the patients pathology result and surgical options -right breast DCIS found on pathology for lumpectomy for ADH. The margin of the DCIS was positive - Re-excisional biopsy will be done. The patient will go to lab today for INR check if okay Dr MARINA will do her surgery 4/11/19. Educational supportive materials were given and reviewed. MRI will be ordered at later date is Dr MARINA feels necessary. Genetic referral has been made -

## 2019-04-12 LAB
CYTO UR: NORMAL
LAB AP CASE REPORT: NORMAL
LAB AP CLINICAL INFORMATION: NORMAL
PATH REPORT.FINAL DX SPEC: NORMAL
PATH REPORT.GROSS SPEC: NORMAL

## 2019-04-16 NOTE — PROGRESS NOTES
Subjective     PROBLEM LIST:  1. [id number] DCIS of the right breast, ER+ GA+  A) right lumpectomy on 19. Pathology showed a 2.4 cm low grade DCIS.  reexcision 19 showed no residual disease.  2. Anxiety/depression  3. History of multiple chronic pulmonary embolism, s/p pulmonary thrombectomy   4. GERD      CHIEF COMPLAINT: DCIS of the right breast      HISTORY OF PRESENT ILLNESS:  The patient is a 68 y.o. year old female, referred for evaluation of DCIS of the right breast.  She presented with an abnormality on screening mammogram.  She underwent a bilateral lumpectomy on .  The left side showed benign disease.  The right side showed 2.4 cm of low-grade DCIS with positive margins.  She underwent reexcision a week later with no additional disease.  She says she feels tired but otherwise is recovering pretty well.  She has some fluid collection in the right breast and feels tender on that side.    She has a medical history of multiple bilateral pulmonary emboli.  She has been experiencing shortness of breath for quite some time and had an extensive workup here in [place] all of which was unrevealing.  She ultimately was evaluated in [place] where her daughter lives and was found to have multiple bilateral pulmonary embolism with subsequent pulmonary hypertension.  She actually underwent a surgery in [place] during which they removed 100s of clots from her lungs.  She has an IVC filter and is on long-term anticoagulation with warfarin.      REVIEW OF SYSTEMS:  A 14 point review of systems was performed and is negative except as noted above.    Past Medical History:   Diagnosis Date   • Anxiety and depression    • Arthritis    • Cancer (CMS/HCC)    • GERD (gastroesophageal reflux disease)    • History of pulmonary embolism    • History of sepsis    • History of transfusion    • Spinal headache        GYN History: .  Menarche at 11.  1st birth age 24.    Current Outpatient Medications on  "File Prior to Visit   Medication Sig Dispense Refill   • clonazePAM (KlonoPIN) 0.5 MG tablet Take 0.5 mg by mouth Every Night.     • IRON PO Take  by mouth Daily.     • OLANZapine (zyPREXA) 2.5 MG tablet Take 0.625 mg by mouth Every Night.     • omeprazole (priLOSEC) 20 MG capsule Take 20 mg by mouth Daily.     • PARoxetine (PAXIL) 20 MG tablet Take 20 mg by mouth Daily.     • warfarin (COUMADIN) 7.5 MG tablet Take 7.5 mg by mouth Every Night.     • cephalexin (KEFLEX) 500 MG capsule Take 1,000 mg by mouth 2 (Two) Times a Day.     • Enoxaparin Sodium (LOVENOX SC) Inject  under the skin into the appropriate area as directed. Will start lovenox bridge 3/28/19 PM after stopping warfarin       No current facility-administered medications on file prior to visit.        Allergies   Allergen Reactions   • Tetanus Toxoids Anaphylaxis   • Anafranil [Clomipramine Hcl] Other (See Comments)     Severe muscle spasms    • Prednisone Paresthesia     \"mouth and bottom numbness\"   • Adhesive Tape Rash     blister   • Latex Rash       Past Surgical History:   Procedure Laterality Date   • BREAST BIOPSY Bilateral 2019    Procedure: WIRE LOCALIZED EXCISIONAL BIOPSY OF ATYPICAL DUCTAL HYPERPLASIA, AND WIRE LOCALIZED EXCISIONAL LEFT BREAST PAPILLOMA;  Surgeon: Sita Ramon MD;  Location: [facility] OR;  Service: General   • BREAST CYST ASPIRATION Bilateral    • BREAST LUMPECTOMY Right 2019    Procedure: RE-EXCISION OF RIGHT LUMPECTOMY SITE;  Surgeon: Sita Ramon MD;  Location: [facility] OR;  Service: General   • BRONCHOSCOPY     • CARDIAC CATHETERIZATION      no stents    •  SECTION     • COLONOSCOPY      instructed to have another one completed in 10 years    • ENDOSCOPY     • IVC FILTER RETRIEVAL      Placement (not retrieval)   • OTHER SURGICAL HISTORY      Pulmonary thrombo Endacartarectomy        Social History     Socioeconomic History   • Marital status:      Spouse name: Not on file   • " "Number of children: Not on file   • Years of education: Not on file   • Highest education level: Not on file   Tobacco Use   • Smoking status: Never Smoker   • Smokeless tobacco: Never Used   Substance and Sexual Activity   • Alcohol use: No     Frequency: Never   • Drug use: No   • Sexual activity: Defer       Family History   Problem Relation Age of Onset   • Breast cancer Maternal Aunt 40   • Ovarian cancer Maternal Aunt    • Hypertension Mother    • Heart failure Mother    • Diabetes Mother    • Heart disease Father    • Rheumatic fever Father    • Breast cancer Cousin        Objective     /79 Comment: RUE  Pulse 73   Temp 97.3 °F (36.3 °C) (Temporal)   Resp 18   Ht 158.8 cm (62.5\")   Wt 79.4 kg (175 lb)   SpO2 99% Comment: RA  BMI 31.50 kg/m²   Performance Status: 0  General: well appearing female in no acute distress  Neuro: alert and oriented  HEENT: sclera anicteric, oropharynx clear  Lymphatics: no cervical, supraclavicular, or axillary adenopathy  Cardiovascular: regular rate and rhythm, no murmurs  Lungs: clear to auscultation bilaterally  Abdomen: soft, nontender, nondistended.  No palpable organomegaly  Extremeties: no lower extremity edema  Skin: no rashes, lesions, bruising, or petechiae  Psych: mood and affect appropriate            Assessment/Plan     Bailey Armstrong is a 68 y.o. year old female with DCIS of the right breast.    We discussed the finding of DCIS in the breast.  I told her that DCIS is not truly a cancer as it has not invaded into the tissues.  It does not have the potential to spread to lymph nodes or other parts of the body.  However, if untreated DCIS does have the potential to turn into an invasive cancer.  For this reason we treat it in much the same way that we treat invasive cancers, with surgery and radiation.  We also discussed the option of adjuvant hormonal therapy.  Hormone therapy in this setting can reduce the risk of recurrent DCIS or invasive cancer in " both breasts.  It does not change the risk of dying from breast cancer.    We discussed that her likelihood of DCIS or invasive cancer recurrence is approximately 5%, assuming that she does radiation treatment.  Adjuvant endocrine therapy can reduce this risk by about half or 2-3%.  We reviewed the potential side effects of anastrozole including hot flashes, vaginal dryness, joint pain, decrease in bone density, and mood or sleep disturbance.  I do not think she is a candidate for tamoxifen given the thromboembolic risk.  She says that she had fairly significant side effects with menopause and does not think that the small benefit of adjuvant aromatase inhibitor is worth the potential toxicity of treatment.  I think this is very reasonable.  I will not schedule routine oncology follow-up, but I am certainly happy to see her in the future if the need arises.  She is planning on proceeding with radiation treatment.               Sonam Vallejo MD    4/17/2019

## 2019-04-17 ENCOUNTER — CONSULT (OUTPATIENT)
Dept: ONCOLOGY | Facility: CLINIC | Age: 69
End: 2019-04-17

## 2019-04-17 ENCOUNTER — HOSPITAL ENCOUNTER (OUTPATIENT)
Dept: RADIATION ONCOLOGY | Facility: HOSPITAL | Age: 69
Setting detail: RADIATION/ONCOLOGY SERIES
Discharge: HOME | End: 2019-04-17

## 2019-04-17 ENCOUNTER — OFFICE VISIT (OUTPATIENT)
Dept: RADIATION ONCOLOGY | Facility: HOSPITAL | Age: 69
End: 2019-04-17

## 2019-04-17 VITALS
SYSTOLIC BLOOD PRESSURE: 128 MMHG | BODY MASS INDEX: 31.01 KG/M2 | HEART RATE: 73 BPM | DIASTOLIC BLOOD PRESSURE: 79 MMHG | WEIGHT: 175 LBS | HEIGHT: 63 IN | OXYGEN SATURATION: 99 % | TEMPERATURE: 97.3 F | RESPIRATION RATE: 18 BRPM

## 2019-04-17 DIAGNOSIS — Z17.0 MALIGNANT NEOPLASM OF UPPER-OUTER QUADRANT OF RIGHT BREAST IN FEMALE, ESTROGEN RECEPTOR POSITIVE (HCC): Primary | ICD-10-CM

## 2019-04-17 DIAGNOSIS — C50.411 MALIGNANT NEOPLASM OF UPPER-OUTER QUADRANT OF RIGHT BREAST IN FEMALE, ESTROGEN RECEPTOR POSITIVE (HCC): Primary | ICD-10-CM

## 2019-04-17 DIAGNOSIS — D05.11 DUCTAL CARCINOMA IN SITU (DCIS) OF RIGHT BREAST: Primary | ICD-10-CM

## 2019-04-17 PROCEDURE — G0463 HOSPITAL OUTPT CLINIC VISIT: HCPCS

## 2019-04-17 PROCEDURE — 99204 OFFICE O/P NEW MOD 45 MIN: CPT | Performed by: INTERNAL MEDICINE

## 2019-04-17 NOTE — PROGRESS NOTES
"CONSULTATION NOTE    NAME:      Bailey Armstrong  :                                                          1950  DATE OF CONSULTATION:                       2019   REQUESTING PHYSICIAN:                   Sita Ramon MD  REASON FOR CONSULTATION:           Cancer Staging  Ductal carcinoma in situ (DCIS) of right breast  Staging form: Breast, AJCC 8th Edition  - Pathologic: Stage 0 (pTis (DCIS), pN0, cM0, ER: Positive, TN: Positive, HER2: Not assessed ) - Signed by Sonam Vallejo MD on 2019         BRIEF HISTORY:  Bailey Armstrong  is a very pleasant 68 y.o. female  who had an abnormal mammogram showing a mass in each breast.  She underwent bilateral lumpectomies. The left breast was negative and  pathology of the right breast 12 to 1 o'clock position revealed a low-grade DCIS spanning 2.4 cm.  There was no invasive cancer seen and no necrosis present, the margin was positive but negative on re-excision a week later.  She is here to discuss postoperative radiation.  She will see Dr. Vallejo this afternoon regarding adjuvant hormonal blockade.        Allergies   Allergen Reactions   • Tetanus Toxoids Anaphylaxis   • Anafranil [Clomipramine Hcl] Other (See Comments)     Severe muscle spasms    • Prednisone Paresthesia     \"mouth and bottom numbness\"   • Adhesive Tape Rash     blister   • Latex Rash       Social History     Tobacco Use   • Smoking status: Never Smoker   • Smokeless tobacco: Never Used   Substance Use Topics   • Alcohol use: No     Frequency: Never   • Drug use: No         Past Medical History:   Diagnosis Date   • Anxiety and depression    • Arthritis    • Cancer (CMS/HCC)    • GERD (gastroesophageal reflux disease)    • History of pulmonary embolism 2017   • History of sepsis    • History of transfusion    • Spinal headache        family history includes Breast cancer (age of onset: 40) in her maternal aunt.     Past Surgical History:   Procedure Laterality Date   • BREAST " BIOPSY Bilateral 2019    Procedure: WIRE LOCALIZED EXCISIONAL BIOPSY OF ATYPICAL DUCTAL HYPERPLASIA, AND WIRE LOCALIZED EXCISIONAL LEFT BREAST PAPILLOMA;  Surgeon: Sita Ramon MD;  Location: [facility] OR;  Service: General   • BREAST CYST ASPIRATION Bilateral    • BREAST LUMPECTOMY Right 2019    Procedure: RE-EXCISION OF RIGHT LUMPECTOMY SITE;  Surgeon: Sita Ramon MD;  Location:  [facility] OR;  Service: General   • CARDIAC CATHETERIZATION      no stents    •  SECTION     • COLONOSCOPY      instructed to have another one completed in 10 years    • ENDOSCOPY     • OTHER SURGICAL HISTORY      Pulmonary thrombo Endacartarectomy         Review of Systems - Oncology        Objective   VITAL SIGNS: Weight 174 pounds height 63.5 inches blood pressure 147/86 pulse 81    KPS      90%    Physical Exam   Constitutional: She is oriented to person, place, and time. She appears well-developed and well-nourished. No distress.   HENT:   Head: Normocephalic and atraumatic.   Eyes: EOM are normal. No scleral icterus.   Neck: Neck supple.   Cardiovascular: Normal rate and regular rhythm.   Old surgical scar midline of chest   Pulmonary/Chest: Effort normal and breath sounds normal.   Lymphadenopathy:     She has no cervical adenopathy.   Neurological: She is alert and oriented to person, place, and time.   Nursing note and vitals reviewed.  The breast exam reveals the left breast has no masses, suspicious lesions or skin changes.  The left breast has a well-healed surgical incision at the 12 o'clock position adjacent to the nipple areolar complex from negative excision the right breast has a well-healing surgical incision adjacent to the nipple areolar complex at the 12 o'clock position with ecchymosis associated.  She has no axillary adenopathy bilaterally.       The following portions of the patient's history were reviewed and updated as appropriate: allergies, current medications, past family  history, past medical history, past social history, past surgical history and problem list.    Assessment      IMPRESSION: Mrs. Armstrong has 2.4 cm of low-grade DCIS and is status post lumpectomy and reexcision.  There was no necrosis and margins were negative after reexcision.    RECOMMENDATIONS: I recommend postoperative radiotherapy to the right breast of 40.05 Gy in 15 fractions and no tumor bed boost.  The pros and cons, risks and benefits of treatment were discussed with Mrs. Armstrong and her .  An appointment was made for her to return to see us in 4 weeks for reevaluation and treatment planning.  This will give her adequate time to heal.  Thank you very much for letting me participate in the care of this very pleasant patient.         Shonna Reeves MD      Errors in dictation may reflect use of voice recognition software and not all errors in transcription may have been detected prior to signing.

## 2019-05-09 ENCOUNTER — OFFICE VISIT (OUTPATIENT)
Dept: RADIATION ONCOLOGY | Facility: HOSPITAL | Age: 69
End: 2019-05-09

## 2019-05-09 ENCOUNTER — CLINICAL SUPPORT (OUTPATIENT)
Dept: GENETICS | Facility: HOSPITAL | Age: 69
End: 2019-05-09

## 2019-05-09 ENCOUNTER — APPOINTMENT (OUTPATIENT)
Dept: LAB | Facility: HOSPITAL | Age: 69
End: 2019-05-09

## 2019-05-09 ENCOUNTER — HOSPITAL ENCOUNTER (OUTPATIENT)
Dept: RADIATION ONCOLOGY | Facility: HOSPITAL | Age: 69
Setting detail: RADIATION/ONCOLOGY SERIES
Discharge: HOME | End: 2019-05-09

## 2019-05-09 ENCOUNTER — HOSPITAL ENCOUNTER (OUTPATIENT)
Dept: RADIATION ONCOLOGY | Facility: HOSPITAL | Age: 69
Discharge: HOME | End: 2019-05-09

## 2019-05-09 VITALS
RESPIRATION RATE: 16 BRPM | DIASTOLIC BLOOD PRESSURE: 79 MMHG | WEIGHT: 174 LBS | HEART RATE: 73 BPM | HEIGHT: 63 IN | BODY MASS INDEX: 30.83 KG/M2 | SYSTOLIC BLOOD PRESSURE: 128 MMHG

## 2019-05-09 DIAGNOSIS — D05.11 DUCTAL CARCINOMA IN SITU (DCIS) OF RIGHT BREAST: Primary | ICD-10-CM

## 2019-05-09 DIAGNOSIS — Z13.79 GENETIC TESTING: Primary | ICD-10-CM

## 2019-05-09 DIAGNOSIS — Z80.49 FAMILY HISTORY OF UTERINE CANCER: ICD-10-CM

## 2019-05-09 DIAGNOSIS — Z17.0 MALIGNANT NEOPLASM OF UPPER-OUTER QUADRANT OF RIGHT BREAST IN FEMALE, ESTROGEN RECEPTOR POSITIVE (HCC): Primary | ICD-10-CM

## 2019-05-09 DIAGNOSIS — C50.411 MALIGNANT NEOPLASM OF UPPER-OUTER QUADRANT OF RIGHT BREAST IN FEMALE, ESTROGEN RECEPTOR POSITIVE (HCC): Primary | ICD-10-CM

## 2019-05-09 DIAGNOSIS — Z80.41 FAMILY HISTORY OF OVARIAN CANCER: ICD-10-CM

## 2019-05-09 DIAGNOSIS — Z80.3 FAMILY HISTORY OF BREAST CANCER: ICD-10-CM

## 2019-05-09 PROCEDURE — 96040: CPT | Performed by: GENETIC COUNSELOR, MS

## 2019-05-09 PROCEDURE — 77290 THER RAD SIMULAJ FIELD CPLX: CPT | Performed by: RADIOLOGY

## 2019-05-09 PROCEDURE — G0463 HOSPITAL OUTPT CLINIC VISIT: HCPCS

## 2019-05-09 PROCEDURE — 77332 RADIATION TREATMENT AID(S): CPT | Performed by: RADIOLOGY

## 2019-05-09 NOTE — PROGRESS NOTES
"CONSULTATION NOTE    NAME:      Bailey Armstrong  :                                                          1950  DATE OF CONSULTATION:                       2019   REQUESTING PHYSICIAN:                   Sita Ramon MD  REASON FOR CONSULTATION:           Cancer Staging  Ductal carcinoma in situ (DCIS) of right breast  Staging form: Breast, AJCC 8th Edition  - Pathologic: Stage 0 (pTis (DCIS), pN0, cM0, ER: Positive, NM: Positive, HER2: Not assessed )        BRIEF HISTORY: Bailey Armstrong  is a very pleasant 68 y.o. female  who had an abnormal mammogram showing a mass in each breast.  She underwent bilateral lumpectomies. The left breast was negative and  pathology of the right breast 12 to 1 o'clock position revealed a low-grade DCIS spanning 2.4 cm.  There was no invasive cancer seen and no necrosis present, the margin was positive but negative on re-excision a week later.  She is here to discuss postoperative radiation.  She saw Dr. Vallejo and will not be receiving any hormonal blockade.  She returns today to proceed with postoperative radiation to the right breast.  Allergies   Allergen Reactions   • Tetanus Toxoids Anaphylaxis   • Anafranil [Clomipramine Hcl] Other (See Comments)     Severe muscle spasms    • Prednisone Paresthesia     \"mouth and bottom numbness\"   • Adhesive Tape Rash     blister   • Latex Rash       Social History     Tobacco Use   • Smoking status: Never Smoker   • Smokeless tobacco: Never Used   Substance Use Topics   • Alcohol use: No     Frequency: Never   • Drug use: No         Past Medical History:   Diagnosis Date   • Anxiety and depression    • Arthritis    • Cancer (CMS/HCC)    • Ductal carcinoma in situ (DCIS) of right breast 2019   • GERD (gastroesophageal reflux disease)    • History of pulmonary embolism    • History of sepsis    • History of transfusion    • Spinal headache        family history includes Breast cancer in her cousin; Breast cancer " "(age of onset: 40) in her maternal aunt; Diabetes in her mother; Heart disease in her father; Heart failure in her mother; Hypertension in her mother; Ovarian cancer in her maternal aunt; Rheumatic fever in her father.     Past Surgical History:   Procedure Laterality Date   • BREAST BIOPSY Bilateral 2019    Procedure: WIRE LOCALIZED EXCISIONAL BIOPSY OF ATYPICAL DUCTAL HYPERPLASIA, AND WIRE LOCALIZED EXCISIONAL LEFT BREAST PAPILLOMA;  Surgeon: Sita Ramon MD;  Location: [facility] OR;  Service: General   • BREAST CYST ASPIRATION Bilateral    • BREAST LUMPECTOMY Right 2019    Procedure: RE-EXCISION OF RIGHT LUMPECTOMY SITE;  Surgeon: Sita Ramon MD;  Location:  [facility] OR;  Service: General   • BRONCHOSCOPY     • CARDIAC CATHETERIZATION      no stents    •  SECTION     • COLONOSCOPY      instructed to have another one completed in 10 years    • ENDOSCOPY     • IVC FILTER RETRIEVAL      Placement (not retrieval)   • OTHER SURGICAL HISTORY      Pulmonary thrombo Endacartarectomy         Review of Systems   All other systems reviewed and are negative.          Objective   VITAL SIGNS:   Vitals:    19 0940   BP: 128/79   Pulse: 73   Resp: 16   Weight: 78.9 kg (174 lb)   Height: 158.8 cm (62.5\")   PainSc: 0-No pain        KPS       90%    Physical Exam   Constitutional: She is oriented to person, place, and time. She appears well-developed and well-nourished. No distress.   HENT:   Head: Normocephalic and atraumatic.   Eyes: EOM are normal. No scleral icterus.   Neck: Neck supple.   Cardiovascular: Normal rate and regular rhythm.   Pulmonary/Chest: Effort normal and breath sounds normal.   Lymphadenopathy:     She has no cervical adenopathy.   Neurological: She is alert and oriented to person, place, and time.   Nursing note and vitals reviewed.  The breast exam reveals the left breast has no masses, suspicious lesions or skin changes.  The left breast has a well-healed surgical " incision at the 12 o'clock position adjacent to the nipple areolar complex from negative excision the right breast has a well-healing surgical incision adjacent to the nipple areolar complex at the 12 o'clock position .  Ecchymosis has resolved.  She has no axillary adenopathy bilaterally.               The following portions of the patient's history were reviewed and updated as appropriate: allergies, current medications, past family history, past medical history, past social history, past surgical history and problem list.    Assessment      IMPRESSION:  Mrs. Armstrong has 2.4 cm of low-grade DCIS and is status post lumpectomy and reexcision.  There was no necrosis and margins were negative after reexcision.     RECOMMENDATIONS: I recommend postoperative radiotherapy to the right breast of 40.05 Gy in 15 fractions and no tumor bed boost.  The pros and cons, risks and benefits of treatment were discussed with Mrs. Armstrong and informed consent obtained.   She has an appointment today for treatment planning.  We will then proceed with radiotherapy.  It is a pleasure to see Mrs. Armstrong           Shonna Reeves MD      Errors in dictation may reflect use of voice recognition software and not all errors in transcription may have been detected prior to signing.

## 2019-05-10 NOTE — PROGRESS NOTES
Bailey Armstrong, a 68-year-old female, was referred for genetic counseling due to a personal history of ductal carcinoma in situ (DCIS). Ms. Armstrong was recently diagnosed with DCIS of the right breast at 68. She retains her uterus and ovaries.  She was interested in discussing her risk for a hereditary cancer syndrome.  Ms. Armstrong was interested in pursuing a multi-gene panel to evaluate her risk of cancer, therefore the CancerNext panel was ordered through [employer] which analyzes BRCA1/2 and 32 additional genes associated with an increased cancer risk. The genes on this panel include APC, YOKASTA, BARD1, BMPR1A, BRCA1, BRCA2, BRIP1, CDH1, CDK4, CDKN2A, CHEK2, DICER1, EPCAM, GREM1, HOXB13, MLH1, MRE11A, MSH2, MSH6, MUTYH, NBN, NF1, PALB2, PMS2, POLD1, POLE, PTEN, RAD50, RAD51C, RAD51D, SMAD4, SMARCA4, STK11, and TP53. Results are expected within 2-3 weeks.     PERTINENT FAMILY HISTORY: (See attached pedigree)   Mat. Aunt:  Breast cancer; Ovarian cancer, ~50  Mat. 1st cousin: Breast cancer; Ovarian cancer, ~45  Mat. 1st cousin: Breast cancer; Uterine cancer, ~50    We do not have medical records regarding any of these diagnoses.      RISK ASSESSMENT:  Ms. Armstrong’s personal and family history of cancer led to concern for a hereditary cancer syndrome. We discussed BRCA1/2 testing as well as the option of pursuing a panel that would test for other genes known to impact cancer risk in addition to BRCA1/2.   Ms. Armstrong clearly meets NCCN guidelines criteria for BRCA1/2 testing based on her personal history of breast cancer and family history of breast and ovarian cancer. These risk assessments are based on the family history information provided at the time of the appointment, and could change in the future should new information be obtained.    GENETIC COUNSELING (30 minutes):  We reviewed the family history information in detail. Cases of cancer follow three general patterns: sporadic, familial, and hereditary.   While most cancer is sporadic, some cases appear to occur in family clusters.  These cases are said to be familial and account for 10-20% of cancer cases.  Familial cases may be due to a combination of shared genes and environmental factors among family members.  In even fewer families, the cancer is said to be inherited, and the genes responsible for the cancer are known.      Family histories typical of hereditary cancer syndromes usually include multiple first- and second-degree relatives diagnosed with cancer types that define a syndrome.  These cases tend to be diagnosed at younger-than-expected ages and can be bilateral or multifocal.  The cancer in these families follows an autosomal dominant inheritance pattern, which indicates the likely presence of a mutation in a cancer susceptibility gene.  Children and siblings of an individual believed to carry this mutation have a 50% chance of inheriting that mutation, thereby inheriting the increased risk to develop cancer.  These mutations can be passed down from the maternal or the paternal lineage.    Hereditary breast cancer accounts for 5-10% of all cases of breast cancer.  A significant proportion of hereditary breast and ovarian cancer can be attributed to mutations in the BRCA1 and BRCA2 genes.  Mutations in these genes confer an increased risk for breast cancer, ovarian cancer, male breast cancer, prostate cancer, and pancreatic cancer. Women with a BRCA1 or BRCA2 mutation who have already been diagnosed with breast cancer have a 40-60% lifetime risk of a second breast cancer. Women with a BRCA1 or BRCA2 mutation have up to a 44% risk of ovarian cancer.      There are other genes that are known to be associated with an increased risk for cancer.  Some of these genes have well defined cancer risks and established management guidelines.  Other genes that can be tested for have been more recently described, and there may be less data regarding the risks and  therefore may not have established management guidelines. We discussed these limitations at length.  Based on Ms. Armstrong’s desire to get as much information as possible regarding her personal risks and potential risks for her family, she opted to pursue testing through a panel that would look at several other genes known to increase the risk for cancer.    GENETIC TESTING:  The risks, benefits and limitations of genetic testing and implications for clinical management following testing were reviewed.  DNA test results can influence decisions regarding screening, prevention and surgical management.  Genetic testing can have significant psychological implications for both individuals and families.  Also discussed was the possibility of employment and insurance discrimination based on genetic test results and the laws in place to prevent this ([place]).    We discussed panel testing, which would involve testing for BRCA1/2 as well as 32 additional genes that are associated with increased cancer risk. The benefits and limitations of genetic testing were discussed and Ms. Armstrong decided to pursue testing via the panel. The implications of a positive or negative test result were discussed. We discussed the possibility that, in some cases, genetic test results may be informative or may be ambiguous due to the identification of a genetic variant. These variants may or may not be associated with an increased cancer risk.  With multigene panel testing, it is not uncommon for a variant of uncertain significance (VUS) to be identified.  If a VUS is identified, testing family members is typically not recommended and screening recommendations are made based on the family history.  The laboratories that perform genetic testing work to reclassify the VUS and send out an amended report if and when a VUS is reclassified.  The majority of variant findings are ultimately reclassified to a negative result.  Given her personal and family  history, a negative test result would not eliminate all cancer risk to her relatives, although the risk would not be as high as it would with positive genetic testing.      PLAN: Genetic testing via the CancerNext panel through [employer] was ordered and results are expected within 2-3 weeks. Ms. Armstrong is welcome to contact us in the meantime with any questions she may have.      Malaika Pacheco MS, [facility], [facility]  Licensed Certified Genetic Counselor

## 2019-05-16 ENCOUNTER — DOCUMENTATION (OUTPATIENT)
Dept: GENETICS | Facility: HOSPITAL | Age: 69
End: 2019-05-16

## 2019-05-16 NOTE — PROGRESS NOTES
Bailey Armstrong, a 68-year-old female, was referred for genetic counseling due to a personal history of ductal carcinoma in situ (DCIS). Ms. Armstrong was recently diagnosed with DCIS of the right breast at 68. She retains her uterus and ovaries.  She was interested in discussing her risk for a hereditary cancer syndrome.  Ms. Armstrong was interested in pursuing a multi-gene panel to evaluate her risk of cancer, therefore the CancerNext panel was ordered through [facility] which analyzes BRCA1/2 and 32 additional genes associated with an increased cancer risk. The genes on this panel include APC, YOKASTA, BARD1, BMPR1A, BRCA1, BRCA2, BRIP1, CDH1, CDK4, CDKN2A, CHEK2, DICER1, EPCAM, GREM1, HOXB13, MLH1, MRE11A, MSH2, MSH6, MUTYH, NBN, NF1, PALB2, PMS2, POLD1, POLE, PTEN, RAD50, RAD51C, RAD51D, SMAD4, SMARCA4, STK11, and TP53.  Genetic testing was negative for known pathogenic mutations in BRCA1/2 and 32 additional genes on the CancerNext panel.  These normal results were discussed with Ms. Armstrong by telephone on 5/16/19.    PERTINENT FAMILY HISTORY: (See attached pedigree)   Mat. Aunt:  Breast cancer; Ovarian cancer, ~50  Mat. 1st cousin: Breast cancer; Ovarian cancer, ~45  Mat. 1st cousin: Breast cancer; Uterine cancer, ~50    We do not have medical records regarding any of these diagnoses.      RISK ASSESSMENT:  Ms. Armstrong’s personal and family history of cancer led to concern for a hereditary cancer syndrome. We discussed BRCA1/2 testing as well as the option of pursuing a panel that would test for other genes known to impact cancer risk in addition to BRCA1/2.   Ms. Armstrong clearly meets NCCN guidelines criteria for BRCA1/2 testing based on her personal history of breast cancer and family history of breast and ovarian cancer. These risk assessments are based on the family history information provided at the time of the appointment, and could change in the future should new information be obtained.    GENETIC  COUNSELING:  We reviewed the family history information in detail. Cases of cancer follow three general patterns: sporadic, familial, and hereditary.  While most cancer is sporadic, some cases appear to occur in family clusters.  These cases are said to be familial and account for 10-20% of cancer cases.  Familial cases may be due to a combination of shared genes and environmental factors among family members.  In even fewer families, the cancer is said to be inherited, and the genes responsible for the cancer are known.      Family histories typical of hereditary cancer syndromes usually include multiple first- and second-degree relatives diagnosed with cancer types that define a syndrome.  These cases tend to be diagnosed at younger-than-expected ages and can be bilateral or multifocal.  The cancer in these families follows an autosomal dominant inheritance pattern, which indicates the likely presence of a mutation in a cancer susceptibility gene.  Children and siblings of an individual believed to carry this mutation have a 50% chance of inheriting that mutation, thereby inheriting the increased risk to develop cancer.  These mutations can be passed down from the maternal or the paternal lineage.    Hereditary breast cancer accounts for 5-10% of all cases of breast cancer.  A significant proportion of hereditary breast and ovarian cancer can be attributed to mutations in the BRCA1 and BRCA2 genes.  Mutations in these genes confer an increased risk for breast cancer, ovarian cancer, male breast cancer, prostate cancer, and pancreatic cancer. Women with a BRCA1 or BRCA2 mutation who have already been diagnosed with breast cancer have a 40-60% lifetime risk of a second breast cancer. Women with a BRCA1 or BRCA2 mutation have up to a 44% risk of ovarian cancer.      There are other genes that are known to be associated with an increased risk for cancer.  Some of these genes have well defined cancer risks and  established management guidelines.  Other genes that can be tested for have been more recently described, and there may be less data regarding the risks and therefore may not have established management guidelines. We discussed these limitations at length.  Based on Ms. Armstrong’s desire to get as much information as possible regarding her personal risks and potential risks for her family, she opted to pursue testing through a panel that would look at several other genes known to increase the risk for cancer.    GENETIC TESTING:  The risks, benefits and limitations of genetic testing and implications for clinical management following testing were reviewed.  DNA test results can influence decisions regarding screening, prevention and surgical management.  Genetic testing can have significant psychological implications for both individuals and families.  Also discussed was the possibility of employment and insurance discrimination based on genetic test results and the laws in place to prevent this ([place]).    We discussed panel testing, which would involve testing for BRCA1/2 as well as 32 additional genes that are associated with increased cancer risk. The benefits and limitations of genetic testing were discussed and Ms. Armstrong decided to pursue testing via the panel. The implications of a positive or negative test result were discussed. We discussed the possibility that, in some cases, genetic test results may be informative or may be ambiguous due to the identification of a genetic variant. These variants may or may not be associated with an increased cancer risk.  With multigene panel testing, it is not uncommon for a variant of uncertain significance (VUS) to be identified.  If a VUS is identified, testing family members is typically not recommended and screening recommendations are made based on the family history.  The laboratories that perform genetic testing work to reclassify the VUS and send out an amended  report if and when a VUS is reclassified.  The majority of variant findings are ultimately reclassified to a negative result.  Given her personal and family history, a negative test result would not eliminate all cancer risk to her relatives, although the risk would not be as high as it would with positive genetic testing.      TEST RESULTS:  Genetic testing was negative for pathogenic mutations by sequencing and rearrangement testing for the genes on the CancerNext panel (see attached results).    This negative result greatly lowers but does not eliminate the risk of a hereditary cancer syndrome for Ms. rAmstrong.  It is possible that the family history is due to a hereditary cancer syndrome that Ms. Armstrong did not happen to inherit.  This assessment is based on the information provided at time of consultation.    CANCER PREVENTION: Ms. Armstrong’s surveillance and management should be determined by her oncology team. Despite the negative genetic test results, Ms. Armstrong’s female relatives may have a somewhat increased lifetime risk for breast cancer based on family history.  Given the increased risk, options available to individuals with a high lifetime risk for breast cancer were briefly discussed.  This includes increased surveillance and chemoprevention.     Surveillance for individuals with a high lifetime risk of breast cancer (>20%, versus the average risk of 12%), based on NCCN guidelines, would consist of semi-annual clinical breast exams and monthly self-breast exams starting by age 18 and annual mammography starting 10 years younger than the earliest diagnosis in a close relative, or starting by age 40.  According to an American Cancer Society expert panel, annual breast MRI should be offered to women whose lifetime risk of breast cancer is 20-25 percent or more, also starting by age 40 or earlier if indicated by family history.  Female relatives could have a risk assessment performed using a family  history-based model, such as the Tyrer-Cuzick model, to determine their individual risks.      PLAN: Genetic counseling remains available to Ms. Armstrong and her family. She is welcome to contact us with any questions or concerns at [phone].      Malaika Pacheco MS, [facility] [place], [facility]  Licensed Certified Genetic Counselor       Cc: MD Doris London MD Paula Larmore

## 2019-05-17 PROCEDURE — 77295 3-D RADIOTHERAPY PLAN: CPT | Performed by: RADIOLOGY

## 2019-05-17 PROCEDURE — 77300 RADIATION THERAPY DOSE PLAN: CPT | Performed by: RADIOLOGY

## 2019-05-17 PROCEDURE — 77334 RADIATION TREATMENT AID(S): CPT | Performed by: RADIOLOGY

## 2019-05-24 ENCOUNTER — HOSPITAL ENCOUNTER (OUTPATIENT)
Dept: RADIATION ONCOLOGY | Facility: HOSPITAL | Age: 69
Discharge: HOME | End: 2019-05-24

## 2019-05-24 PROCEDURE — 77280 THER RAD SIMULAJ FIELD SMPL: CPT | Performed by: RADIOLOGY

## 2019-05-28 ENCOUNTER — HOSPITAL ENCOUNTER (OUTPATIENT)
Dept: RADIATION ONCOLOGY | Facility: HOSPITAL | Age: 69
Discharge: HOME | End: 2019-05-28

## 2019-05-28 VITALS — BODY MASS INDEX: 32.04 KG/M2 | WEIGHT: 178 LBS

## 2019-05-28 PROCEDURE — 77412 RADIATION TX DELIVERY LVL 3: CPT | Performed by: RADIOLOGY

## 2019-05-29 ENCOUNTER — HOSPITAL ENCOUNTER (OUTPATIENT)
Dept: RADIATION ONCOLOGY | Facility: HOSPITAL | Age: 69
Discharge: HOME | End: 2019-05-29

## 2019-05-29 PROCEDURE — 77412 RADIATION TX DELIVERY LVL 3: CPT | Performed by: RADIOLOGY

## 2019-05-30 ENCOUNTER — HOSPITAL ENCOUNTER (OUTPATIENT)
Dept: RADIATION ONCOLOGY | Facility: HOSPITAL | Age: 69
Discharge: HOME | End: 2019-05-30

## 2019-05-30 PROCEDURE — 77336 RADIATION PHYSICS CONSULT: CPT | Performed by: RADIOLOGY

## 2019-05-30 PROCEDURE — 77412 RADIATION TX DELIVERY LVL 3: CPT | Performed by: RADIOLOGY

## 2019-05-31 ENCOUNTER — HOSPITAL ENCOUNTER (OUTPATIENT)
Dept: RADIATION ONCOLOGY | Facility: HOSPITAL | Age: 69
Discharge: HOME | End: 2019-05-31

## 2019-05-31 PROCEDURE — 77412 RADIATION TX DELIVERY LVL 3: CPT | Performed by: RADIOLOGY

## 2019-06-03 ENCOUNTER — HOSPITAL ENCOUNTER (OUTPATIENT)
Dept: RADIATION ONCOLOGY | Facility: HOSPITAL | Age: 69
Setting detail: RADIATION/ONCOLOGY SERIES
Discharge: HOME | End: 2019-06-03

## 2019-06-03 ENCOUNTER — HOSPITAL ENCOUNTER (OUTPATIENT)
Dept: RADIATION ONCOLOGY | Facility: HOSPITAL | Age: 69
Discharge: HOME | End: 2019-06-03

## 2019-06-03 PROCEDURE — 77412 RADIATION TX DELIVERY LVL 3: CPT | Performed by: RADIOLOGY

## 2019-06-03 PROCEDURE — 77417 THER RADIOLOGY PORT IMAGE(S): CPT | Performed by: RADIOLOGY

## 2019-06-04 ENCOUNTER — HOSPITAL ENCOUNTER (OUTPATIENT)
Dept: RADIATION ONCOLOGY | Facility: HOSPITAL | Age: 69
Discharge: HOME | End: 2019-06-04

## 2019-06-04 VITALS — WEIGHT: 178.9 LBS | BODY MASS INDEX: 32.2 KG/M2

## 2019-06-04 PROCEDURE — 77412 RADIATION TX DELIVERY LVL 3: CPT | Performed by: RADIOLOGY

## 2019-06-05 ENCOUNTER — HOSPITAL ENCOUNTER (OUTPATIENT)
Dept: RADIATION ONCOLOGY | Facility: HOSPITAL | Age: 69
Discharge: HOME | End: 2019-06-05

## 2019-06-05 PROCEDURE — 77412 RADIATION TX DELIVERY LVL 3: CPT | Performed by: RADIOLOGY

## 2019-06-06 ENCOUNTER — HOSPITAL ENCOUNTER (OUTPATIENT)
Dept: RADIATION ONCOLOGY | Facility: HOSPITAL | Age: 69
Discharge: HOME | End: 2019-06-06

## 2019-06-06 PROCEDURE — 77336 RADIATION PHYSICS CONSULT: CPT | Performed by: RADIOLOGY

## 2019-06-06 PROCEDURE — 77412 RADIATION TX DELIVERY LVL 3: CPT | Performed by: RADIOLOGY

## 2019-06-07 ENCOUNTER — HOSPITAL ENCOUNTER (OUTPATIENT)
Dept: RADIATION ONCOLOGY | Facility: HOSPITAL | Age: 69
Discharge: HOME | End: 2019-06-07

## 2019-06-07 PROCEDURE — 77412 RADIATION TX DELIVERY LVL 3: CPT | Performed by: RADIOLOGY

## 2019-06-10 ENCOUNTER — HOSPITAL ENCOUNTER (OUTPATIENT)
Dept: RADIATION ONCOLOGY | Facility: HOSPITAL | Age: 69
Discharge: HOME | End: 2019-06-10

## 2019-06-10 PROCEDURE — 77417 THER RADIOLOGY PORT IMAGE(S): CPT | Performed by: RADIOLOGY

## 2019-06-10 PROCEDURE — 77412 RADIATION TX DELIVERY LVL 3: CPT | Performed by: RADIOLOGY

## 2019-06-11 ENCOUNTER — DOCUMENTATION (OUTPATIENT)
Dept: NUTRITION | Facility: HOSPITAL | Age: 69
End: 2019-06-11

## 2019-06-11 ENCOUNTER — HOSPITAL ENCOUNTER (OUTPATIENT)
Dept: RADIATION ONCOLOGY | Facility: HOSPITAL | Age: 69
Discharge: HOME | End: 2019-06-11

## 2019-06-11 VITALS — BODY MASS INDEX: 32.22 KG/M2 | WEIGHT: 179 LBS

## 2019-06-11 DIAGNOSIS — D05.11 DUCTAL CARCINOMA IN SITU (DCIS) OF RIGHT BREAST: Primary | ICD-10-CM

## 2019-06-11 PROCEDURE — 77412 RADIATION TX DELIVERY LVL 3: CPT | Performed by: RADIOLOGY

## 2019-06-11 NOTE — PROGRESS NOTES
ON[facility] Nutrition    Diagnosis:  Malignant neoplasm of upper-outer quadrant of right breast in female, estrogen receptor positive (CMS/HCC)   Treatment:  S/P lumpectomy & reexcision / post operative radiation (15 fractions)    Height 62.5 inches  Weight 179 lbs BMI 31.3 (obese)    Discussed options for weight management following completion of treatment.  Referral made to OP Nutrition Services for weight management; patient plans to check with her insurance company to see if her plan would cover the cost.

## 2019-06-12 ENCOUNTER — HOSPITAL ENCOUNTER (OUTPATIENT)
Dept: RADIATION ONCOLOGY | Facility: HOSPITAL | Age: 69
Discharge: HOME | End: 2019-06-12

## 2019-06-12 PROCEDURE — 77412 RADIATION TX DELIVERY LVL 3: CPT | Performed by: RADIOLOGY

## 2019-06-13 ENCOUNTER — HOSPITAL ENCOUNTER (OUTPATIENT)
Dept: RADIATION ONCOLOGY | Facility: HOSPITAL | Age: 69
Discharge: HOME | End: 2019-06-13

## 2019-06-13 PROCEDURE — 77336 RADIATION PHYSICS CONSULT: CPT | Performed by: RADIOLOGY

## 2019-06-13 PROCEDURE — 77412 RADIATION TX DELIVERY LVL 3: CPT | Performed by: RADIOLOGY

## 2019-06-14 ENCOUNTER — HOSPITAL ENCOUNTER (OUTPATIENT)
Dept: RADIATION ONCOLOGY | Facility: HOSPITAL | Age: 69
Discharge: HOME | End: 2019-06-14

## 2019-06-14 PROCEDURE — 77412 RADIATION TX DELIVERY LVL 3: CPT | Performed by: RADIOLOGY

## 2019-06-17 ENCOUNTER — HOSPITAL ENCOUNTER (OUTPATIENT)
Dept: RADIATION ONCOLOGY | Facility: HOSPITAL | Age: 69
Discharge: HOME | End: 2019-06-17

## 2019-06-17 PROCEDURE — 77412 RADIATION TX DELIVERY LVL 3: CPT | Performed by: RADIOLOGY

## 2019-06-26 NOTE — RADIATION COMPLETION NOTES
COMPLETION NOTE    PATIENT:   Bailey Armstrong  :    1950  COMPLETION DATE:   2019  DIAGNOSIS:   Ductal carcinoma in situ (DCIS) of right breast  Staging form: Breast, AJCC 8th Edition  - Pathologic: Stage 0 (pTis (DCIS), pN0, cM0, ER: Positive, FL: Positive, HER2: Not assessed ) - Signed by Sonam Vallejo MD on 2019           Subjective      BRIEF HISTORY:  Bailey Armstrong  is a very pleasant 68 y.o. female  who had an abnormal mammogram showing a mass in each breast.  She underwent bilateral lumpectomies. The left breast was negative and  pathology of the right breast 12 to 1 o'clock position revealed a low-grade DCIS spanning 2.4 cm.  There was no invasive cancer seen and no necrosis present, the margin was positive but negative on re-excision a week later.  She received radiotherapy in our department as follows:    TREATMENT COURSE:   528-2019 the right breast received 40.05 Gy in 15 fractions with 10 MV photons    TOLERANCE:   She had mild erythema of the skin and mild fatigue but continued to walk every day while under treatment.  We referred her to nutrition services on an outpatient basis.      DISPOSITION:  At the completion of therapy an appointment was made for her to return on 2019 at 10:15 AM.  She knows to call if she has any problems sooner.        Shonna Reeves MD    Dictated using dragon dictation

## 2019-07-17 ENCOUNTER — OFFICE VISIT (OUTPATIENT)
Dept: RADIATION ONCOLOGY | Facility: HOSPITAL | Age: 69
End: 2019-07-17

## 2019-07-17 ENCOUNTER — HOSPITAL ENCOUNTER (OUTPATIENT)
Dept: RADIATION ONCOLOGY | Facility: HOSPITAL | Age: 69
Setting detail: RADIATION/ONCOLOGY SERIES
Discharge: HOME | End: 2019-07-17

## 2019-07-17 VITALS
RESPIRATION RATE: 18 BRPM | WEIGHT: 178.2 LBS | HEART RATE: 94 BPM | SYSTOLIC BLOOD PRESSURE: 137 MMHG | DIASTOLIC BLOOD PRESSURE: 78 MMHG | TEMPERATURE: 98.2 F | BODY MASS INDEX: 31.57 KG/M2 | HEIGHT: 63 IN

## 2019-07-17 DIAGNOSIS — Z85.3 HISTORY OF BREAST CANCER: Primary | ICD-10-CM

## 2019-07-17 PROCEDURE — G0463 HOSPITAL OUTPT CLINIC VISIT: HCPCS

## 2019-07-17 NOTE — PROGRESS NOTES
"FOLLOW UP NOTE    PATIENT:                                                      Bailey Armstrong  MEDICAL RECORD #:                        [id number]  :                                                          1950  COMPLETION DATE:   2019  DIAGNOSIS:     Cancer Staging  Ductal carcinoma in situ (DCIS) of right breast  Staging form: Breast, AJCC 8th Edition  - Pathologic: Stage 0 (pTis (DCIS), pN0, cM0, ER: Positive, WI: Positive, HER2: Not assessed )         BRIEF HISTORY:    Bailey Armstrong  is a very pleasant 68 y.o. female  who had an abnormal mammogram showing a mass in each breast.  She underwent bilateral lumpectomies. The left breast was negative and  pathology of the right breast 12 to 1 o'clock position revealed a low-grade DCIS spanning 2.4 cm.  There was no invasive cancer seen and no necrosis present, the margin was positive but negative on re-excision a week later.  She received radiotherapy to the right breast of 40.05 Gy in 15 fractions.    She had mild erythema of the skin and mild fatigue but continued to walk every day while under treatment.  We referred her to nutrition services on an outpatient basis. She has no complaints today.          MEDICATIONS: Medication reconciliation for the patient was reviewed and confirmed in the electronic medical record.    Review of Systems - Oncology    KPS 90%    Physical Exam   Constitutional: She appears well-developed and well-nourished.   Neck: Neck supple.   Cardiovascular: Normal rate, regular rhythm and normal heart sounds.   Pulmonary/Chest: Effort normal and breath sounds normal.   Nursing note and vitals reviewed.  The examination of the treated breast reveals the skin is dry and hyperpigmented in the treated area.    VITAL SIGNS:   Vitals:    19 1012   BP: 137/78   Pulse: 94   Resp: 18   Temp: 98.2 °F (36.8 °C)   Weight: 80.8 kg (178 lb 3.2 oz)   Height: 158.8 cm (62.5\")   PainSc: 0-No pain       The following portions of the " patient's history were reviewed and updated as appropriate: allergies, current medications, past family history, past medical history, past social history, past surgical history and problem list.         Bailey was seen today for breast cancer.    Diagnoses and all orders for this visit:    History of breast cancer         IMPRESSION:  Bailey Armstrong  is a very pleasant 68 y.o. female  who had an abnormal mammogram showing a mass in each breast.  She underwent bilateral lumpectomies. The left breast was negative and  pathology of the right breast 12 to 1 o'clock position revealed a low-grade DCIS spanning 2.4 cm.  There was no invasive cancer seen and no necrosis present, the margin was positive but negative on re-excision a week later.  She received radiotherapy to the right breast of 40.05 Gy in 15 fractions.    She has no acute side effects of radiotherapy.  RECOMMENDATIONS: I recommend Aquaphor for the dry skin of the right breast.  Dr. Ramon is following Mrs. Armstrong in accordance with NCCN guidelines.  We will see her back in our department anytime as needed.  It has been a pleasure to participate in her care.  Return for PRN.    Shonna Reeves MD    Errors in dictation may reflect use of voice recognition software and not all errors in transcription may have been detected prior to signing.

## 2019-08-05 ENCOUNTER — TRANSCRIBE ORDERS (OUTPATIENT)
Dept: ADMINISTRATIVE | Facility: HOSPITAL | Age: 69
End: 2019-08-05

## 2019-08-08 ENCOUNTER — TRANSCRIBE ORDERS (OUTPATIENT)
Dept: MAMMOGRAPHY | Facility: HOSPITAL | Age: 69
End: 2019-08-08

## 2019-08-08 DIAGNOSIS — D05.11 DUCTAL CARCINOMA IN SITU OF RIGHT BREAST: Primary | ICD-10-CM

## 2019-09-03 ENCOUNTER — OFFICE VISIT (OUTPATIENT)
Dept: INTERNAL MEDICINE | Facility: CLINIC | Age: 69
End: 2019-09-03

## 2019-09-03 VITALS
HEIGHT: 63 IN | DIASTOLIC BLOOD PRESSURE: 84 MMHG | TEMPERATURE: 98 F | WEIGHT: 175.6 LBS | OXYGEN SATURATION: 98 % | BODY MASS INDEX: 31.11 KG/M2 | SYSTOLIC BLOOD PRESSURE: 114 MMHG | HEART RATE: 81 BPM | RESPIRATION RATE: 18 BRPM

## 2019-09-03 DIAGNOSIS — Z86.711 HISTORY OF PULMONARY EMBOLISM: ICD-10-CM

## 2019-09-03 DIAGNOSIS — F41.9 ANXIETY: ICD-10-CM

## 2019-09-03 DIAGNOSIS — Z79.01 CHRONIC ANTICOAGULATION: Primary | ICD-10-CM

## 2019-09-03 PROBLEM — Z51.81 ENCOUNTER FOR MONITORING COUMADIN THERAPY: Status: ACTIVE | Noted: 2019-09-03

## 2019-09-03 LAB — INR PPP: 2.7 (ref 0.9–1.1)

## 2019-09-03 PROCEDURE — 85610 PROTHROMBIN TIME: CPT | Performed by: NURSE PRACTITIONER

## 2019-09-03 PROCEDURE — 36416 COLLJ CAPILLARY BLOOD SPEC: CPT | Performed by: NURSE PRACTITIONER

## 2019-09-03 PROCEDURE — 99203 OFFICE O/P NEW LOW 30 MIN: CPT | Performed by: NURSE PRACTITIONER

## 2019-09-03 NOTE — PROGRESS NOTES
Subjective   Bailey Armstrong is a 68 y.o. female here to establish care.  Chief Complaint   Patient presents with   • Establish Care   • Pulmonary Embolism     needs INR check       History of Present Illness     Ms. Armstrong is here today to establish care. She will be primarily seeing Dr. Buenrostro, but is in need of having her INR checked today for chronic coumadin therapy.   She had a DVT/PE in 2017. She underwent thrombectomy and IVC filter placement at that time.   Last INR was about 1 month ago and was 2.6. Pt reports her goal INR has been 2.5-3.5.  She denies any easy bleeding, cough, CP, dyspnea.   She is followed by Dr. Alcala with  cardiology as well. She is supposed to see every 3 months for US, VQ scan, and CXR.       She has also been undergoing treatment for breast cancer. Followed by Dr. Reeves who she last saw 7/2019.  She is also followed by Dr. Ramon.   Pt had an abnormal mammogram showing a mass in each breast.  She underwent bilateral lumpectomies. The left breast was negative andpathology of the right breast 12 to 1 o'clock position revealed a low-grade DCIS spanning 2.4 cm.  There was no invasive cancer seen and no necrosis present, the margin was positive but negative on re-excision a week later.  She received radiotherapy to the right breast as well.    She also has anxiety which is well controlled with current meds: clonazepam, zyprexa, and paxil.       The following portions of the patient's history were reviewed and updated as appropriate: allergies, current medications, past family history, past medical history, past social history, past surgical history and problem list.    Review of Systems   Constitutional: Negative for appetite change, chills, fatigue and fever.   HENT: Negative for congestion, ear pain, rhinorrhea, sinus pressure and sore throat.    Eyes: Negative for itching and visual disturbance.   Respiratory: Negative for cough, shortness of breath and wheezing.   "  Cardiovascular: Negative for chest pain, palpitations and leg swelling.   Gastrointestinal: Negative for abdominal pain, constipation, diarrhea, nausea and vomiting.   Endocrine: Negative for cold intolerance and heat intolerance.   Genitourinary: Negative for difficulty urinating, dysuria and hematuria.   Musculoskeletal: Negative for arthralgias, back pain, joint swelling and myalgias.   Skin: Negative for rash and wound.   Allergic/Immunologic: Negative for environmental allergies and food allergies.   Neurological: Negative for dizziness, numbness and headaches.   Hematological: Negative for adenopathy. Does not bruise/bleed easily.   Psychiatric/Behavioral: Negative for dysphoric mood and sleep disturbance. The patient is not nervous/anxious.    All other systems reviewed and are negative.    Blood pressure 114/84, pulse 81, temperature 98 °F (36.7 °C), resp. rate 18, height 159.4 cm (62.75\"), weight 79.7 kg (175 lb 9.6 oz), SpO2 98 %.    Allergies   Allergen Reactions   • Tetanus Toxoids Anaphylaxis   • Anafranil [Clomipramine Hcl] Other (See Comments)     Severe muscle spasms    • Prednisone Paresthesia     \"mouth and bottom numbness\"   • Adhesive Tape Rash     blister   • Latex Rash     Past Medical History:   Diagnosis Date   • Anemia    • Anxiety and depression    • Arthritis    • Cancer (CMS/HCC)    • Clotting disorder (CMS/HCC)    • Ductal carcinoma in situ (DCIS) of right breast 4/17/2019   • GERD (gastroesophageal reflux disease)    • History of pulmonary embolism 2017   • History of sepsis    • History of transfusion    • Spinal headache      Past Surgical History:   Procedure Laterality Date   • BREAST BIOPSY Bilateral 4/2/2019    Procedure: WIRE LOCALIZED EXCISIONAL BIOPSY OF ATYPICAL DUCTAL HYPERPLASIA, AND WIRE LOCALIZED EXCISIONAL LEFT BREAST PAPILLOMA;  Surgeon: Sita Ramon MD;  Location: [facility];  Service: General   • BREAST CYST ASPIRATION Bilateral    • BREAST LUMPECTOMY Right " 2019    Procedure: RE-EXCISION OF RIGHT LUMPECTOMY SITE;  Surgeon: Sita Ramon MD;  [facility];  Service: General   • BRONCHOSCOPY     • CARDIAC CATHETERIZATION      no stents    •  SECTION     • COLONOSCOPY      instructed to have another one completed in 10 years    • ENDOSCOPY     • INFERIOR VENA CAVA ANGIOPLASTY / STENTING      IVC filter placed   • OTHER SURGICAL HISTORY      Pulmonary thrombo Endacartarectomy      Family History   Problem Relation Age of Onset   • Breast cancer Maternal Aunt 40   • Ovarian cancer Maternal Aunt    • Hypertension Mother    • Heart failure Mother    • Diabetes Mother    • Obesity Mother    • Heart disease Father    • Rheumatic fever Father    • Breast cancer Cousin    • Arthritis Sister    • Diabetes Sister    • Hypertension Sister      Social History     Socioeconomic History   • Marital status:      Spouse name: KILO   • Number of children: Not on file   • Years of education: Not on file   • Highest education level: Not on file   Occupational History   • Occupation: retired teacher   Social Needs   • Financial resource strain: Not hard at all   • Food insecurity:     Worry: Never true     Inability: Never true   • Transportation needs:     Medical: No     Non-medical: No   Tobacco Use   • Smoking status: Never Smoker   • Smokeless tobacco: Never Used   Substance and Sexual Activity   • Alcohol use: Yes     Frequency: Monthly or less     Comment: occasional may 1 glass of wine monthly   • Drug use: No   • Sexual activity: No   Lifestyle   • Physical activity:     Days per week: 0 days     Minutes per session: Not on file   • Stress: To some extent   Social History Narrative    Lives with spouse (Kilo)     Immunization History   Administered Date(s) Administered   • Hepatitis A 2018, 2018   • Influenza TIV (IM) 10/24/2016   • Pneumococcal Polysaccharide (PPSV23) 10/24/2016, 2017       Current Outpatient  Medications:   •  clonazePAM (KlonoPIN) 0.5 MG tablet, Take 0.5 mg by mouth Every Night., Disp: , Rfl:   •  IRON PO, Take  by mouth Daily., Disp: , Rfl:   •  OLANZapine (zyPREXA) 2.5 MG tablet, Take 0.625 mg by mouth Every Night., Disp: , Rfl:   •  omeprazole (priLOSEC) 20 MG capsule, Take 20 mg by mouth Daily., Disp: , Rfl:   •  PARoxetine (PAXIL) 20 MG tablet, Take 20 mg by mouth Daily., Disp: , Rfl:   •  warfarin (COUMADIN) 7.5 MG tablet, Take 7.5 mg by mouth Every Night., Disp: , Rfl:     Objective   Physical Exam   Constitutional: She is oriented to person, place, and time. She appears well-developed and well-nourished.   HENT:   Head: Normocephalic and atraumatic.   Eyes: Conjunctivae are normal. Pupils are equal, round, and reactive to light.   Neck: Normal range of motion.   Cardiovascular: Normal rate, regular rhythm and normal heart sounds.   Pulmonary/Chest: Effort normal and breath sounds normal.   Abdominal: Soft. Normal appearance and bowel sounds are normal. There is no tenderness.   Musculoskeletal: Normal range of motion.   Neurological: She is alert and oriented to person, place, and time.   Skin: Skin is warm and dry.   Psychiatric: She has a normal mood and affect. Her behavior is normal. Judgment and thought content normal.       Assessment/Plan   Bailey was seen today for establish care and pulmonary embolism.    Diagnoses and all orders for this visit:    Chronic anticoagulation  -     POCT INR    History of pulmonary embolism  -     POCT INR    Anxiety        INR stable today at 2.7. continue current dose of coumadin. She already has appt with Dr. Buenrostro for 1 month out to establish with her.    Continue current meds for anxiety.   Return for keep appt with Dr. Buenrostro on 10/17 and needs repeat INR in 1m.  Plan of care discussed with pt. They verbalized understanding and agreement.       * Please note that portions of this note were completed with a voice recognition program. Efforts were made to  edit the dictation but occasionally words are erroneously transcribed.

## 2019-09-30 RX ORDER — WARFARIN SODIUM 2.5 MG/1
TABLET ORAL
Qty: 7 TABLET | Refills: 0 | Status: SHIPPED | OUTPATIENT
Start: 2019-09-30 | End: 2019-10-18 | Stop reason: SDUPTHER

## 2019-10-01 ENCOUNTER — TELEPHONE (OUTPATIENT)
Dept: INTERNAL MEDICINE | Facility: CLINIC | Age: 69
End: 2019-10-01

## 2019-10-01 ENCOUNTER — CLINICAL SUPPORT (OUTPATIENT)
Dept: INTERNAL MEDICINE | Facility: CLINIC | Age: 69
End: 2019-10-01

## 2019-10-01 DIAGNOSIS — Z86.711 HISTORY OF PULMONARY EMBOLISM: Primary | ICD-10-CM

## 2019-10-01 LAB — INR PPP: 3.5 (ref 0.9–1.1)

## 2019-10-01 PROCEDURE — 85610 PROTHROMBIN TIME: CPT | Performed by: INTERNAL MEDICINE

## 2019-10-01 PROCEDURE — 36416 COLLJ CAPILLARY BLOOD SPEC: CPT | Performed by: INTERNAL MEDICINE

## 2019-10-01 NOTE — TELEPHONE ENCOUNTER
----- Message from Miriam Buenrostro MD sent at 10/1/2019 11:32 AM EDT -----  Can you let pt know, INR is 3.5, so we can leave dose the same and recheck when she returns on 10/17.does she have enough coumadin until her appt? Also, can you confirm her dose - looks like she has rx for both the 7.5 and 2.5mg dose and I'm not sure how much she takes each day

## 2019-10-17 ENCOUNTER — OFFICE VISIT (OUTPATIENT)
Dept: INTERNAL MEDICINE | Facility: CLINIC | Age: 69
End: 2019-10-17

## 2019-10-17 VITALS
TEMPERATURE: 98.2 F | BODY MASS INDEX: 31.54 KG/M2 | HEART RATE: 87 BPM | OXYGEN SATURATION: 98 % | DIASTOLIC BLOOD PRESSURE: 76 MMHG | WEIGHT: 178 LBS | SYSTOLIC BLOOD PRESSURE: 112 MMHG | HEIGHT: 63 IN

## 2019-10-17 DIAGNOSIS — N39.41 URGE INCONTINENCE: ICD-10-CM

## 2019-10-17 DIAGNOSIS — D05.11 DUCTAL CARCINOMA IN SITU (DCIS) OF RIGHT BREAST: ICD-10-CM

## 2019-10-17 DIAGNOSIS — I27.29 PULMONARY HYPERTENSION DUE TO THROMBOEMBOLISM (HCC): ICD-10-CM

## 2019-10-17 DIAGNOSIS — I26.99 PULMONARY HYPERTENSION DUE TO THROMBOEMBOLISM (HCC): ICD-10-CM

## 2019-10-17 DIAGNOSIS — Z79.01 CHRONIC ANTICOAGULATION: Primary | ICD-10-CM

## 2019-10-17 DIAGNOSIS — F41.9 ANXIETY: ICD-10-CM

## 2019-10-17 DIAGNOSIS — R63.5 ABNORMAL WEIGHT GAIN: ICD-10-CM

## 2019-10-17 DIAGNOSIS — Z86.711 HISTORY OF PULMONARY EMBOLISM: ICD-10-CM

## 2019-10-17 DIAGNOSIS — R73.09 ELEVATED GLUCOSE: ICD-10-CM

## 2019-10-17 DIAGNOSIS — R53.83 FATIGUE, UNSPECIFIED TYPE: ICD-10-CM

## 2019-10-17 LAB
25(OH)D3 SERPL-MCNC: 17.6 NG/ML (ref 30–100)
ALBUMIN SERPL-MCNC: 4.1 G/DL (ref 3.5–5.2)
ALBUMIN/GLOB SERPL: 1.1 G/DL
ALP SERPL-CCNC: 108 U/L (ref 39–117)
ALT SERPL W P-5'-P-CCNC: 23 U/L (ref 1–33)
ANION GAP SERPL CALCULATED.3IONS-SCNC: 8.9 MMOL/L (ref 5–15)
AST SERPL-CCNC: 27 U/L (ref 1–32)
BASOPHILS # BLD AUTO: 0.04 10*3/MM3 (ref 0–0.2)
BASOPHILS NFR BLD AUTO: 0.7 % (ref 0–1.5)
BILIRUB BLD-MCNC: NEGATIVE MG/DL
BILIRUB SERPL-MCNC: 1.5 MG/DL (ref 0.2–1.2)
BUN BLD-MCNC: 11 MG/DL (ref 8–23)
BUN/CREAT SERPL: 13.8 (ref 7–25)
CALCIUM SPEC-SCNC: 9.2 MG/DL (ref 8.6–10.5)
CHLORIDE SERPL-SCNC: 96 MMOL/L (ref 98–107)
CLARITY, POC: CLEAR
CO2 SERPL-SCNC: 30.1 MMOL/L (ref 22–29)
COLOR UR: YELLOW
CREAT BLD-MCNC: 0.8 MG/DL (ref 0.57–1)
DEPRECATED RDW RBC AUTO: 45.4 FL (ref 37–54)
EOSINOPHIL # BLD AUTO: 0.25 10*3/MM3 (ref 0–0.4)
EOSINOPHIL NFR BLD AUTO: 4.6 % (ref 0.3–6.2)
ERYTHROCYTE [DISTWIDTH] IN BLOOD BY AUTOMATED COUNT: 13.9 % (ref 12.3–15.4)
GFR SERPL CREATININE-BSD FRML MDRD: 71 ML/MIN/1.73
GLOBULIN UR ELPH-MCNC: 3.7 GM/DL
GLUCOSE BLD-MCNC: 138 MG/DL (ref 65–99)
GLUCOSE UR STRIP-MCNC: NEGATIVE MG/DL
HBA1C MFR BLD: 5.9 % (ref 4.8–5.6)
HCT VFR BLD AUTO: 39.4 % (ref 34–46.6)
HGB BLD-MCNC: 12.9 G/DL (ref 12–15.9)
IMM GRANULOCYTES # BLD AUTO: 0.03 10*3/MM3 (ref 0–0.05)
IMM GRANULOCYTES NFR BLD AUTO: 0.6 % (ref 0–0.5)
INR PPP: 2.9 (ref 0.9–1.1)
KETONES UR QL: NEGATIVE
LEUKOCYTE EST, POC: NEGATIVE
LYMPHOCYTES # BLD AUTO: 1.39 10*3/MM3 (ref 0.7–3.1)
LYMPHOCYTES NFR BLD AUTO: 25.5 % (ref 19.6–45.3)
MCH RBC QN AUTO: 29.5 PG (ref 26.6–33)
MCHC RBC AUTO-ENTMCNC: 32.7 G/DL (ref 31.5–35.7)
MCV RBC AUTO: 90.2 FL (ref 79–97)
MONOCYTES # BLD AUTO: 0.59 10*3/MM3 (ref 0.1–0.9)
MONOCYTES NFR BLD AUTO: 10.8 % (ref 5–12)
NEUTROPHILS # BLD AUTO: 3.15 10*3/MM3 (ref 1.7–7)
NEUTROPHILS NFR BLD AUTO: 57.8 % (ref 42.7–76)
NITRITE UR-MCNC: NEGATIVE MG/ML
NRBC BLD AUTO-RTO: 0 /100 WBC (ref 0–0.2)
PH UR: 6 [PH] (ref 5–8)
PLATELET # BLD AUTO: 264 10*3/MM3 (ref 140–450)
PMV BLD AUTO: 10.9 FL (ref 6–12)
POTASSIUM BLD-SCNC: 4 MMOL/L (ref 3.5–5.2)
PROT SERPL-MCNC: 7.8 G/DL (ref 6–8.5)
PROT UR STRIP-MCNC: NEGATIVE MG/DL
RBC # BLD AUTO: 4.37 10*6/MM3 (ref 3.77–5.28)
RBC # UR STRIP: NEGATIVE /UL
SODIUM BLD-SCNC: 135 MMOL/L (ref 136–145)
SP GR UR: 1.01 (ref 1–1.03)
TSH SERPL DL<=0.05 MIU/L-ACNC: 2.19 UIU/ML (ref 0.27–4.2)
UROBILINOGEN UR QL: NORMAL
VIT B12 BLD-MCNC: 357 PG/ML (ref 211–946)
WBC NRBC COR # BLD: 5.45 10*3/MM3 (ref 3.4–10.8)

## 2019-10-17 PROCEDURE — 99214 OFFICE O/P EST MOD 30 MIN: CPT | Performed by: INTERNAL MEDICINE

## 2019-10-17 PROCEDURE — 83036 HEMOGLOBIN GLYCOSYLATED A1C: CPT | Performed by: INTERNAL MEDICINE

## 2019-10-17 PROCEDURE — 81003 URINALYSIS AUTO W/O SCOPE: CPT | Performed by: INTERNAL MEDICINE

## 2019-10-17 PROCEDURE — 82607 VITAMIN B-12: CPT | Performed by: INTERNAL MEDICINE

## 2019-10-17 PROCEDURE — 36416 COLLJ CAPILLARY BLOOD SPEC: CPT | Performed by: INTERNAL MEDICINE

## 2019-10-17 PROCEDURE — 85610 PROTHROMBIN TIME: CPT | Performed by: INTERNAL MEDICINE

## 2019-10-17 PROCEDURE — 85025 COMPLETE CBC W/AUTO DIFF WBC: CPT | Performed by: INTERNAL MEDICINE

## 2019-10-17 PROCEDURE — 82306 VITAMIN D 25 HYDROXY: CPT | Performed by: INTERNAL MEDICINE

## 2019-10-17 PROCEDURE — 84443 ASSAY THYROID STIM HORMONE: CPT | Performed by: INTERNAL MEDICINE

## 2019-10-17 PROCEDURE — 80053 COMPREHEN METABOLIC PANEL: CPT | Performed by: INTERNAL MEDICINE

## 2019-10-17 NOTE — PROGRESS NOTES
Subjective   Bailey Armstrong is a 69 y.o. female.     History of Present Illness     New pt here to establish. She has seen Jordyn Ambrocio already. H/o:    DVT/multiple PE in 2017. She underwent thrombectomy and IVC filter placement at that time.  she is on warfarin 7.5mg qd w/ anticoagulation range 2.5-3.5  She did see hematologist in [place] but pt states cause of the clots was not found. She does have 2 children and neither has had clots.   She was initially seen at  but apparently was not diagnosed promptly, so she went to [place] where her son-in-law works, which is where the PEs were found. She was than transferred to [place] for surgery to remove the clots    Pulmonary HTN from the PEs - she has been seeing Dr Alcala and has been doing v/q scans and echos every 3 months. She does have appt coming up w/ him. She may want to switch to someone at 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 She has felt a knot in right breast over last month that is tender    Fatigue - has been going on since this summer. No vitamins - was told by the physicians in [place] not to take any supplements w/ her coumadin.    Wt gain since the illness - she weighed 138 before she was sick. Diet has not been great, and has not been exercising    Urinary incontinence - she has leakage daily. Not w/ coughing/sneezing, more with urgency. Has been going on for about 2 yrs, after the hospitalization when she had a urinary cath in    Elevated sugar - last BMP showed glc was in the 120s. Pt has been eating more sweets recently    The following portions of the patient's history were reviewed and updated as appropriate: allergies, current medications, past family history, past medical history, past social history, past surgical history and problem list.    Review of Systems   Constitutional: Positive for activity change and  "unexpected weight gain. Negative for appetite change, fever and unexpected weight loss.   Respiratory: Negative for shortness of breath.    Cardiovascular: Negative for chest pain.   Genitourinary: Positive for urgency and urinary incontinence. Negative for dysuria and frequency.   Skin: Negative.    Allergic/Immunologic: Negative for immunocompromised state.   Neurological: Negative for seizures, memory problem and confusion.   Psychiatric/Behavioral: Positive for depressed mood. Negative for agitation. The patient is nervous/anxious.        Objective   /76 (BP Location: Left arm)   Pulse 87   Temp 98.2 °F (36.8 °C) (Temporal)   Ht 159.4 cm (62.75\")   Wt 80.7 kg (178 lb)   SpO2 98%   BMI 31.78 kg/m²   Physical Exam   Constitutional: She is oriented to person, place, and time. She appears well-developed and well-nourished. No distress.   HENT:   Head: Normocephalic and atraumatic.   Eyes: Conjunctivae and EOM are normal. Pupils are equal, round, and reactive to light. Right eye exhibits no discharge. Left eye exhibits no discharge.   Neck: Normal range of motion. Neck supple.   Cardiovascular: Normal rate and regular rhythm.   Pulmonary/Chest: Effort normal and breath sounds normal.   r breast - tenderness and fullness around right nipple. No erythema   Abdominal: Soft. Bowel sounds are normal.   Musculoskeletal: She exhibits no edema.   Neurological: She is alert and oriented to person, place, and time. No cranial nerve deficit.   Skin: Skin is warm and dry. No rash noted. She is not diaphoretic.   Psychiatric: She has a normal mood and affect. Her behavior is normal. Judgment and thought content normal.   Nursing note and vitals reviewed.        Assessment/Plan   Bailey was seen today for re-establish care, dvt, breast mass, obesity, fatigue and bladder leakage.    Diagnoses and all orders for this visit:    Chronic anticoagulation/History of pulmonary embolism - INR in range today - recheck in 4 " weeks  -     POC INR    Anxiety - stable - sees psychaitry    Ductal carcinoma in situ (DCIS) of right breast - she has noticed more pain and fullness in right breast - she will call Dr MARINA to see if she needs f/u before her next appt    Urge incontinence - she does not want to go on any medication. We discussed lifestyle changes and pelvic floor therapy. 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 She will see how this next appt goes and let me know if she wants referral elsewhere          Prev - her colonoscopy was done in 2014 (Dr Sanchez)-repeat in 10 yrs.  DEXA was normal in 11/18.  she has had both pneumonia shots.  She has had the hepatitis A vaccines. She will get flu shot at [facility]      Old records reviewed today

## 2019-10-18 RX ORDER — WARFARIN SODIUM 2.5 MG/1
2.5 TABLET ORAL DAILY
Qty: 7 TABLET | Refills: 0 | Status: CANCELLED | OUTPATIENT
Start: 2019-10-18

## 2019-10-18 RX ORDER — WARFARIN SODIUM 2.5 MG/1
2.5 TABLET ORAL DAILY
Qty: 30 TABLET | Refills: 5 | Status: SHIPPED | OUTPATIENT
Start: 2019-10-18 | End: 2020-08-05 | Stop reason: ALTCHOICE

## 2019-10-18 RX ORDER — WARFARIN SODIUM 7.5 MG/1
7.5 TABLET ORAL NIGHTLY
Qty: 30 TABLET | Refills: 5 | Status: SHIPPED | OUTPATIENT
Start: 2019-10-18 | End: 2020-04-15

## 2019-10-18 NOTE — TELEPHONE ENCOUNTER
Patient is requesting a refill on 2.5 warfrin.  Shes also wanting a call regarding lab results once resulted.  Pharmacy is [facility].

## 2019-10-21 ENCOUNTER — TELEPHONE (OUTPATIENT)
Dept: INTERNAL MEDICINE | Facility: CLINIC | Age: 69
End: 2019-10-21

## 2019-10-21 NOTE — TELEPHONE ENCOUNTER
----- Message from Miriam Buenrostro MD sent at 10/20/2019  6:31 PM EDT -----  Can you let her know, I sent a detailed letter with her lab results

## 2019-11-07 ENCOUNTER — CLINICAL SUPPORT (OUTPATIENT)
Dept: INTERNAL MEDICINE | Facility: CLINIC | Age: 69
End: 2019-11-07

## 2019-11-07 ENCOUNTER — TELEPHONE (OUTPATIENT)
Dept: INTERNAL MEDICINE | Facility: CLINIC | Age: 69
End: 2019-11-07

## 2019-11-07 LAB — INR PPP: 3.5 (ref 2–3.5)

## 2019-11-07 PROCEDURE — 36416 COLLJ CAPILLARY BLOOD SPEC: CPT | Performed by: INTERNAL MEDICINE

## 2019-11-07 PROCEDURE — 85610 PROTHROMBIN TIME: CPT | Performed by: INTERNAL MEDICINE

## 2019-11-07 NOTE — TELEPHONE ENCOUNTER
----- Message from Miriam Buenrostro MD sent at 11/7/2019  3:44 PM EST -----  Can you let her know, INR is in therapeutic range at 3.5, but since it is high end of the range, I'd like to recheck in 7-10 days just to make sure it doesn't go up more. WOuld stay on same dose of coumadin

## 2019-11-18 ENCOUNTER — TELEPHONE (OUTPATIENT)
Dept: INTERNAL MEDICINE | Facility: CLINIC | Age: 69
End: 2019-11-18

## 2019-11-18 ENCOUNTER — CLINICAL SUPPORT (OUTPATIENT)
Dept: INTERNAL MEDICINE | Facility: CLINIC | Age: 69
End: 2019-11-18

## 2019-11-18 DIAGNOSIS — Z86.718 HISTORY OF BLOOD CLOTS: Primary | ICD-10-CM

## 2019-11-18 LAB — INR PPP: 2.9 (ref 2–3)

## 2019-11-18 PROCEDURE — 36416 COLLJ CAPILLARY BLOOD SPEC: CPT | Performed by: INTERNAL MEDICINE

## 2019-11-18 PROCEDURE — 85610 PROTHROMBIN TIME: CPT | Performed by: INTERNAL MEDICINE

## 2019-11-18 RX ORDER — OMEPRAZOLE 20 MG/1
20 CAPSULE, DELAYED RELEASE ORAL DAILY
Qty: 90 CAPSULE | Refills: 3 | Status: SHIPPED | OUTPATIENT
Start: 2019-11-18 | End: 2020-11-10

## 2019-11-18 NOTE — TELEPHONE ENCOUNTER
----- Message from Miriam Buenrostro MD sent at 11/18/2019  4:59 PM EST -----      ----- Message -----  From: Odalys Huizar MA  Sent: 11/18/2019   4:10 PM  To: Miriam Buenrostro MD    INR today is 2.9 and she takes 7.5 mg daily.

## 2019-12-06 ENCOUNTER — TELEPHONE (OUTPATIENT)
Dept: RADIATION ONCOLOGY | Facility: HOSPITAL | Age: 69
End: 2019-12-06

## 2019-12-06 NOTE — TELEPHONE ENCOUNTER
"Patient called stating she has developed fluid in her treated breast and Dr. MARINA has drained it twice.  She wanted to know if it was normal, did radiation cause this, and is Dr. MARINA the right person to drain it?  I told her Dr. MARINA is the perfect person to drain and to treat these pockets of fluid in the breast.  I told her radiation does delay healing, but doesn't \"cause\" fluid in the breast.  I told her this was normal, but not common and sometimes women must have the breasts drained prior to starting radiation.  She verbalized understanding and said she would continue following with Dr. MARINA.  "

## 2019-12-18 ENCOUNTER — HOSPITAL ENCOUNTER (OUTPATIENT)
Dept: MAMMOGRAPHY | Facility: HOSPITAL | Age: 69
Discharge: HOME | End: 2019-12-18
Admitting: SURGERY

## 2019-12-18 DIAGNOSIS — D05.11 DUCTAL CARCINOMA IN SITU OF RIGHT BREAST: ICD-10-CM

## 2019-12-18 PROCEDURE — 77066 DX MAMMO INCL CAD BI: CPT

## 2019-12-18 PROCEDURE — G0279 TOMOSYNTHESIS, MAMMO: HCPCS | Performed by: RADIOLOGY

## 2019-12-18 PROCEDURE — 77066 DX MAMMO INCL CAD BI: CPT | Performed by: RADIOLOGY

## 2019-12-18 PROCEDURE — G0279 TOMOSYNTHESIS, MAMMO: HCPCS

## 2019-12-27 ENCOUNTER — TELEPHONE (OUTPATIENT)
Dept: INTERNAL MEDICINE | Facility: CLINIC | Age: 69
End: 2019-12-27

## 2019-12-27 ENCOUNTER — CLINICAL SUPPORT (OUTPATIENT)
Dept: INTERNAL MEDICINE | Facility: CLINIC | Age: 69
End: 2019-12-27

## 2019-12-27 LAB — INR PPP: 3 (ref 2–3.5)

## 2019-12-27 PROCEDURE — 85610 PROTHROMBIN TIME: CPT | Performed by: INTERNAL MEDICINE

## 2019-12-27 NOTE — TELEPHONE ENCOUNTER
----- Message from Miriam Buenrostro MD sent at 12/27/2019  2:34 PM EST -----  Can you let her know, INR looks good, can stay on same dose, recheck in 4 weeks

## 2020-01-02 ENCOUNTER — TELEPHONE (OUTPATIENT)
Dept: INTERNAL MEDICINE | Facility: CLINIC | Age: 70
End: 2020-01-02

## 2020-01-02 NOTE — TELEPHONE ENCOUNTER
Patient would like to have a letter stating that she is capable of participating in exercise with a  at the [facility]. They will test her abilities on what they think she is capable of doing.

## 2020-01-30 ENCOUNTER — TELEPHONE (OUTPATIENT)
Dept: INTERNAL MEDICINE | Facility: CLINIC | Age: 70
End: 2020-01-30

## 2020-01-30 ENCOUNTER — CLINICAL SUPPORT (OUTPATIENT)
Dept: INTERNAL MEDICINE | Facility: CLINIC | Age: 70
End: 2020-01-30

## 2020-01-30 DIAGNOSIS — I26.99 PULMONARY EMBOLISM, UNSPECIFIED CHRONICITY, UNSPECIFIED PULMONARY EMBOLISM TYPE, UNSPECIFIED WHETHER ACUTE COR PULMONALE PRESENT (HCC): Primary | ICD-10-CM

## 2020-01-30 LAB — INR PPP: 3.2 (ref 2.5–3.5)

## 2020-01-30 PROCEDURE — 85610 PROTHROMBIN TIME: CPT | Performed by: INTERNAL MEDICINE

## 2020-01-30 NOTE — TELEPHONE ENCOUNTER
----- Message from Miriam Buenrostro MD sent at 1/30/2020  2:31 PM EST -----      ----- Message -----  From: Odalys Huizar MA  Sent: 1/30/2020   2:19 PM EST  To: Miriam Buenrostro MD    3.2 INR today, she takes 7.5 mg daily and eats kiwis.

## 2020-02-12 ENCOUNTER — TRANSCRIBE ORDERS (OUTPATIENT)
Dept: ADMINISTRATIVE | Facility: HOSPITAL | Age: 70
End: 2020-02-12

## 2020-02-12 DIAGNOSIS — R92.8 ABNORMAL MAMMOGRAM: Primary | ICD-10-CM

## 2020-03-05 ENCOUNTER — TELEPHONE (OUTPATIENT)
Dept: INTERNAL MEDICINE | Facility: CLINIC | Age: 70
End: 2020-03-05

## 2020-03-05 ENCOUNTER — CLINICAL SUPPORT (OUTPATIENT)
Dept: INTERNAL MEDICINE | Facility: CLINIC | Age: 70
End: 2020-03-05

## 2020-03-05 DIAGNOSIS — Z86.711 HISTORY OF PULMONARY EMBOLISM: Primary | ICD-10-CM

## 2020-03-05 LAB — INR PPP: 3.1 (ref 2–3)

## 2020-03-05 PROCEDURE — 36416 COLLJ CAPILLARY BLOOD SPEC: CPT | Performed by: INTERNAL MEDICINE

## 2020-03-05 PROCEDURE — 85610 PROTHROMBIN TIME: CPT | Performed by: INTERNAL MEDICINE

## 2020-03-05 NOTE — TELEPHONE ENCOUNTER
----- Message from Miriam Buenrostro MD sent at 3/5/2020 12:11 PM EST -----      ----- Message -----  From: Odalys Huizar MA  Sent: 3/5/2020  11:55 AM EST  To: Miriam Buenrostro MD    INR today is 3.1 she is taking 7.5 mg daily.

## 2020-03-12 ENCOUNTER — TRANSCRIBE ORDERS (OUTPATIENT)
Dept: ADMINISTRATIVE | Facility: HOSPITAL | Age: 70
End: 2020-03-12

## 2020-03-27 ENCOUNTER — TELEPHONE (OUTPATIENT)
Dept: INTERNAL MEDICINE | Facility: CLINIC | Age: 70
End: 2020-03-27

## 2020-03-27 ENCOUNTER — CLINICAL SUPPORT (OUTPATIENT)
Dept: INTERNAL MEDICINE | Facility: CLINIC | Age: 70
End: 2020-03-27

## 2020-03-27 LAB — INR PPP: 2.4 (ref 2–3.5)

## 2020-03-27 PROCEDURE — 85610 PROTHROMBIN TIME: CPT | Performed by: INTERNAL MEDICINE

## 2020-03-27 PROCEDURE — 36416 COLLJ CAPILLARY BLOOD SPEC: CPT | Performed by: INTERNAL MEDICINE

## 2020-03-27 NOTE — TELEPHONE ENCOUNTER
It is close to where we want it but still a little low, so I would recommend doing 5 every day except 7.5 Mondays and Fridays, and let's recheck in 1 week with the change    Also, Can you find out how many days she has been on the 5mg dose?

## 2020-03-27 NOTE — TELEPHONE ENCOUNTER
She came in for an INR, she states that she cut her warfarin down to 5mg daily. She had felt bad and she states she knew it wasn't guerra but she felt better after this. Her usual mg is 7.5mg.

## 2020-03-27 NOTE — TELEPHONE ENCOUNTER
She said she has been on the 5mg for 4 days.  She is aware of the changes and will do them and come in for a recheck in a week.

## 2020-04-03 ENCOUNTER — CLINICAL SUPPORT (OUTPATIENT)
Dept: INTERNAL MEDICINE | Facility: CLINIC | Age: 70
End: 2020-04-03

## 2020-04-03 ENCOUNTER — TELEPHONE (OUTPATIENT)
Dept: INTERNAL MEDICINE | Facility: CLINIC | Age: 70
End: 2020-04-03

## 2020-04-03 LAB — INR PPP: 2 (ref 2–3)

## 2020-04-03 PROCEDURE — 85610 PROTHROMBIN TIME: CPT | Performed by: INTERNAL MEDICINE

## 2020-04-03 PROCEDURE — 36416 COLLJ CAPILLARY BLOOD SPEC: CPT | Performed by: INTERNAL MEDICINE

## 2020-04-03 NOTE — TELEPHONE ENCOUNTER
----- Message from Miriam Buenrostro MD sent at 4/3/2020 10:51 AM EDT -----  Can you let her know her INR is too low at 2 (goal is 2.5-3.5), so we need to go up on her dose to 7.5 Tue, THur, Sat, Sun, and 5mg rest of the days, and recheck in 2 weeks

## 2020-04-15 RX ORDER — WARFARIN SODIUM 7.5 MG/1
7.5 TABLET ORAL NIGHTLY
Qty: 30 TABLET | Refills: 0 | Status: SHIPPED | OUTPATIENT
Start: 2020-04-15 | End: 2020-05-15

## 2020-04-17 ENCOUNTER — CLINICAL SUPPORT (OUTPATIENT)
Dept: INTERNAL MEDICINE | Facility: CLINIC | Age: 70
End: 2020-04-17

## 2020-04-17 ENCOUNTER — TELEPHONE (OUTPATIENT)
Dept: INTERNAL MEDICINE | Facility: CLINIC | Age: 70
End: 2020-04-17

## 2020-04-17 LAB — INR PPP: 2.4 (ref 0.9–1.1)

## 2020-04-17 PROCEDURE — 85610 PROTHROMBIN TIME: CPT | Performed by: INTERNAL MEDICINE

## 2020-04-17 NOTE — PROGRESS NOTES
INR is close to goal (2.5-3.5). We can leave her dose the same, and recheck in 2 weeks, just to make sure it is no lower

## 2020-04-17 NOTE — TELEPHONE ENCOUNTER
----- Message from Miriam Buenrostro MD sent at 4/17/2020  8:47 AM EDT -----      ----- Message -----  From: Kesha Harvey  Sent: 4/17/2020   8:42 AM EDT  To: Miriam Buenrostro MD    INR 2.4

## 2020-05-01 ENCOUNTER — TELEPHONE (OUTPATIENT)
Dept: INTERNAL MEDICINE | Facility: CLINIC | Age: 70
End: 2020-05-01

## 2020-05-01 NOTE — TELEPHONE ENCOUNTER
----- Message from Miriam Buenrostro MD sent at 5/1/2020  8:31 AM EDT -----  Can you notify pt INR looks good at 2.7; continue same dose, and let's recheck in 4 weeks

## 2020-05-15 ENCOUNTER — DOCUMENTATION (OUTPATIENT)
Dept: ONCOLOGY | Facility: CLINIC | Age: 70
End: 2020-05-15

## 2020-05-15 RX ORDER — WARFARIN SODIUM 7.5 MG/1
TABLET ORAL
Qty: 14 TABLET | Refills: 0 | Status: SHIPPED | OUTPATIENT
Start: 2020-05-15 | End: 2020-07-20

## 2020-05-27 ENCOUNTER — CLINICAL SUPPORT (OUTPATIENT)
Dept: INTERNAL MEDICINE | Facility: CLINIC | Age: 70
End: 2020-05-27

## 2020-05-27 ENCOUNTER — TELEPHONE (OUTPATIENT)
Dept: INTERNAL MEDICINE | Facility: CLINIC | Age: 70
End: 2020-05-27

## 2020-05-27 DIAGNOSIS — I26.99 PULMONARY EMBOLISM, UNSPECIFIED CHRONICITY, UNSPECIFIED PULMONARY EMBOLISM TYPE, UNSPECIFIED WHETHER ACUTE COR PULMONALE PRESENT (HCC): Primary | ICD-10-CM

## 2020-05-27 LAB — INR PPP: 2.3 (ref 2–3)

## 2020-05-27 PROCEDURE — 85610 PROTHROMBIN TIME: CPT | Performed by: INTERNAL MEDICINE

## 2020-05-27 NOTE — TELEPHONE ENCOUNTER
----- Message from Miriam Buenrostro MD sent at 5/27/2020  4:25 PM EDT -----  Can you let her know INR is a little low at 2.3, so would go up to 7.5mg 5 days a week, and 5mg 2 days a week, and recheck in 2 weeks

## 2020-05-27 NOTE — TELEPHONE ENCOUNTER
S/W pt to inform her of INR and change of medication dosage. She verbalized understanding and appreciation. Advised to recheck in 2 weeks.

## 2020-06-11 ENCOUNTER — TELEPHONE (OUTPATIENT)
Dept: INTERNAL MEDICINE | Facility: CLINIC | Age: 70
End: 2020-06-11

## 2020-06-11 NOTE — TELEPHONE ENCOUNTER
----- Message from PASCUAL Bazan sent at 6/11/2020  9:07 AM EDT -----      ----- Message -----  From: Kesha Harvey  Sent: 6/11/2020   9:01 AM EDT  To: [address] [facility]

## 2020-06-19 ENCOUNTER — HOSPITAL ENCOUNTER (OUTPATIENT)
Dept: MAMMOGRAPHY | Facility: HOSPITAL | Age: 70
Discharge: HOME | End: 2020-06-19
Admitting: SURGERY

## 2020-06-19 ENCOUNTER — HOSPITAL ENCOUNTER (OUTPATIENT)
Dept: ULTRASOUND IMAGING | Facility: HOSPITAL | Age: 70
Discharge: HOME | End: 2020-06-19

## 2020-06-19 DIAGNOSIS — R92.8 ABNORMAL MAMMOGRAM: ICD-10-CM

## 2020-06-19 PROCEDURE — 76642 ULTRASOUND BREAST LIMITED: CPT | Performed by: RADIOLOGY

## 2020-06-19 PROCEDURE — G0279 TOMOSYNTHESIS, MAMMO: HCPCS | Performed by: RADIOLOGY

## 2020-06-19 PROCEDURE — 77065 DX MAMMO INCL CAD UNI: CPT

## 2020-06-19 PROCEDURE — 76642 ULTRASOUND BREAST LIMITED: CPT

## 2020-06-19 PROCEDURE — 77065 DX MAMMO INCL CAD UNI: CPT | Performed by: RADIOLOGY

## 2020-06-19 PROCEDURE — G0279 TOMOSYNTHESIS, MAMMO: HCPCS

## 2020-06-26 ENCOUNTER — CLINICAL SUPPORT (OUTPATIENT)
Dept: INTERNAL MEDICINE | Facility: CLINIC | Age: 70
End: 2020-06-26

## 2020-06-26 ENCOUNTER — TELEPHONE (OUTPATIENT)
Dept: INTERNAL MEDICINE | Facility: CLINIC | Age: 70
End: 2020-06-26

## 2020-06-26 DIAGNOSIS — Z79.01 CHRONIC ANTICOAGULATION: Primary | ICD-10-CM

## 2020-06-26 LAB — INR PPP: 3.3 (ref 2–3)

## 2020-06-26 PROCEDURE — 36416 COLLJ CAPILLARY BLOOD SPEC: CPT | Performed by: INTERNAL MEDICINE

## 2020-06-26 PROCEDURE — 85610 PROTHROMBIN TIME: CPT | Performed by: INTERNAL MEDICINE

## 2020-06-26 NOTE — TELEPHONE ENCOUNTER
----- Message from Miriam Buenrostro MD sent at 6/26/2020  9:02 AM EDT -----  INR looks good at 3.3.  Let's keep warfarin dose the same and let us recheck in about 3 weeks.  I would like her to schedule a follow-up appointment with me in 3 weeks, so we can do the INR then if she can schedule the follow-up

## 2020-06-26 NOTE — TELEPHONE ENCOUNTER
PN. She stated understanding.  She will call back to schedule appointment after she checks her schedule

## 2020-07-20 RX ORDER — WARFARIN SODIUM 7.5 MG/1
TABLET ORAL
Qty: 30 TABLET | Refills: 0 | Status: SHIPPED | OUTPATIENT
Start: 2020-07-20 | End: 2020-08-05 | Stop reason: SDUPTHER

## 2020-07-20 RX ORDER — WARFARIN SODIUM 5 MG/1
TABLET ORAL
Qty: 30 TABLET | Refills: 0 | Status: SHIPPED | OUTPATIENT
Start: 2020-07-20 | End: 2020-08-05 | Stop reason: SDUPTHER

## 2020-08-05 ENCOUNTER — OFFICE VISIT (OUTPATIENT)
Dept: INTERNAL MEDICINE | Facility: CLINIC | Age: 70
End: 2020-08-05

## 2020-08-05 ENCOUNTER — LAB (OUTPATIENT)
Dept: LAB | Facility: HOSPITAL | Age: 70
End: 2020-08-05

## 2020-08-05 VITALS
BODY MASS INDEX: 31.5 KG/M2 | HEIGHT: 63 IN | SYSTOLIC BLOOD PRESSURE: 122 MMHG | DIASTOLIC BLOOD PRESSURE: 68 MMHG | HEART RATE: 98 BPM | OXYGEN SATURATION: 98 % | WEIGHT: 177.8 LBS | TEMPERATURE: 97.3 F

## 2020-08-05 DIAGNOSIS — R73.09 ELEVATED GLUCOSE: ICD-10-CM

## 2020-08-05 DIAGNOSIS — E55.9 VITAMIN D DEFICIENCY: ICD-10-CM

## 2020-08-05 DIAGNOSIS — Z79.01 CHRONIC ANTICOAGULATION: ICD-10-CM

## 2020-08-05 DIAGNOSIS — I27.29 PULMONARY HYPERTENSION DUE TO THROMBOEMBOLISM (HCC): Primary | ICD-10-CM

## 2020-08-05 DIAGNOSIS — I26.99 PULMONARY HYPERTENSION DUE TO THROMBOEMBOLISM (HCC): Primary | ICD-10-CM

## 2020-08-05 DIAGNOSIS — Z86.711 HISTORY OF PULMONARY EMBOLISM: ICD-10-CM

## 2020-08-05 DIAGNOSIS — E53.8 B12 DEFICIENCY: ICD-10-CM

## 2020-08-05 DIAGNOSIS — D05.11 DUCTAL CARCINOMA IN SITU (DCIS) OF RIGHT BREAST: ICD-10-CM

## 2020-08-05 LAB
25(OH)D3 SERPL-MCNC: 28.9 NG/ML (ref 30–100)
ALBUMIN SERPL-MCNC: 4.3 G/DL (ref 3.5–5.2)
ALBUMIN/GLOB SERPL: 1.2 G/DL
ALP SERPL-CCNC: 96 U/L (ref 39–117)
ALT SERPL W P-5'-P-CCNC: 31 U/L (ref 1–33)
ANION GAP SERPL CALCULATED.3IONS-SCNC: 13 MMOL/L (ref 5–15)
AST SERPL-CCNC: 34 U/L (ref 1–32)
BASOPHILS # BLD AUTO: 0.06 10*3/MM3 (ref 0–0.2)
BASOPHILS NFR BLD AUTO: 1 % (ref 0–1.5)
BILIRUB SERPL-MCNC: 1.4 MG/DL (ref 0–1.2)
BUN SERPL-MCNC: 8 MG/DL (ref 8–23)
BUN/CREAT SERPL: 9.6 (ref 7–25)
CALCIUM SPEC-SCNC: 9.5 MG/DL (ref 8.6–10.5)
CHLORIDE SERPL-SCNC: 100 MMOL/L (ref 98–107)
CHOLEST SERPL-MCNC: 212 MG/DL (ref 0–200)
CO2 SERPL-SCNC: 26 MMOL/L (ref 22–29)
CREAT SERPL-MCNC: 0.83 MG/DL (ref 0.57–1)
DEPRECATED RDW RBC AUTO: 44.8 FL (ref 37–54)
EOSINOPHIL # BLD AUTO: 0.28 10*3/MM3 (ref 0–0.4)
EOSINOPHIL NFR BLD AUTO: 4.6 % (ref 0.3–6.2)
ERYTHROCYTE [DISTWIDTH] IN BLOOD BY AUTOMATED COUNT: 13.9 % (ref 12.3–15.4)
GFR SERPL CREATININE-BSD FRML MDRD: 68 ML/MIN/1.73
GLOBULIN UR ELPH-MCNC: 3.5 GM/DL
GLUCOSE SERPL-MCNC: 131 MG/DL (ref 65–99)
HBA1C MFR BLD: 7.32 % (ref 4.8–5.6)
HCT VFR BLD AUTO: 43.9 % (ref 34–46.6)
HDLC SERPL-MCNC: 36 MG/DL (ref 40–60)
HGB BLD-MCNC: 14.4 G/DL (ref 12–15.9)
IMM GRANULOCYTES # BLD AUTO: 0.02 10*3/MM3 (ref 0–0.05)
IMM GRANULOCYTES NFR BLD AUTO: 0.3 % (ref 0–0.5)
INR PPP: 3.5 (ref 2.5–3.5)
LDLC SERPL CALC-MCNC: 128 MG/DL (ref 0–100)
LDLC/HDLC SERPL: 3.57 {RATIO}
LYMPHOCYTES # BLD AUTO: 1.93 10*3/MM3 (ref 0.7–3.1)
LYMPHOCYTES NFR BLD AUTO: 31.9 % (ref 19.6–45.3)
MCH RBC QN AUTO: 29.2 PG (ref 26.6–33)
MCHC RBC AUTO-ENTMCNC: 32.8 G/DL (ref 31.5–35.7)
MCV RBC AUTO: 89 FL (ref 79–97)
MONOCYTES # BLD AUTO: 0.54 10*3/MM3 (ref 0.1–0.9)
MONOCYTES NFR BLD AUTO: 8.9 % (ref 5–12)
NEUTROPHILS NFR BLD AUTO: 3.22 10*3/MM3 (ref 1.7–7)
NEUTROPHILS NFR BLD AUTO: 53.3 % (ref 42.7–76)
NRBC BLD AUTO-RTO: 0 /100 WBC (ref 0–0.2)
PLATELET # BLD AUTO: 314 10*3/MM3 (ref 140–450)
PMV BLD AUTO: 10.6 FL (ref 6–12)
POTASSIUM SERPL-SCNC: 4.3 MMOL/L (ref 3.5–5.2)
PROT SERPL-MCNC: 7.8 G/DL (ref 6–8.5)
RBC # BLD AUTO: 4.93 10*6/MM3 (ref 3.77–5.28)
SODIUM SERPL-SCNC: 139 MMOL/L (ref 136–145)
TRIGL SERPL-MCNC: 238 MG/DL (ref 0–150)
TSH SERPL DL<=0.05 MIU/L-ACNC: 2.5 UIU/ML (ref 0.27–4.2)
VIT B12 BLD-MCNC: 818 PG/ML (ref 211–946)
VLDLC SERPL-MCNC: 47.6 MG/DL (ref 5–40)
WBC # BLD AUTO: 6.05 10*3/MM3 (ref 3.4–10.8)

## 2020-08-05 PROCEDURE — 82306 VITAMIN D 25 HYDROXY: CPT

## 2020-08-05 PROCEDURE — 36415 COLL VENOUS BLD VENIPUNCTURE: CPT

## 2020-08-05 PROCEDURE — 82607 VITAMIN B-12: CPT

## 2020-08-05 PROCEDURE — 80061 LIPID PANEL: CPT

## 2020-08-05 PROCEDURE — 85025 COMPLETE CBC W/AUTO DIFF WBC: CPT

## 2020-08-05 PROCEDURE — 99214 OFFICE O/P EST MOD 30 MIN: CPT | Performed by: INTERNAL MEDICINE

## 2020-08-05 PROCEDURE — 85610 PROTHROMBIN TIME: CPT | Performed by: INTERNAL MEDICINE

## 2020-08-05 PROCEDURE — 83036 HEMOGLOBIN GLYCOSYLATED A1C: CPT

## 2020-08-05 PROCEDURE — 84443 ASSAY THYROID STIM HORMONE: CPT

## 2020-08-05 PROCEDURE — 36416 COLLJ CAPILLARY BLOOD SPEC: CPT | Performed by: INTERNAL MEDICINE

## 2020-08-05 PROCEDURE — 80053 COMPREHEN METABOLIC PANEL: CPT

## 2020-08-05 RX ORDER — WARFARIN SODIUM 5 MG/1
5 TABLET ORAL DAILY
Qty: 30 TABLET | Refills: 5 | Status: SHIPPED | OUTPATIENT
Start: 2020-08-05 | End: 2021-08-26

## 2020-08-05 RX ORDER — HYDROQUINONE 40 MG/G
1 CREAM TOPICAL DAILY PRN
COMMUNITY

## 2020-08-05 RX ORDER — WARFARIN SODIUM 7.5 MG/1
TABLET ORAL
Qty: 30 TABLET | Refills: 4 | Status: SHIPPED | OUTPATIENT
Start: 2020-08-05 | End: 2021-05-05 | Stop reason: SDUPTHER

## 2020-08-05 NOTE — PROGRESS NOTES
Subjective   Bailey Armstrong is a 69 y.o. female.     History of Present Illness     Here for f/u on:    DVT/multiple PE in 2017- she has IIVC filter and is on warfarin currently taking 6days a week  7.5mg,and on Mondays, 5mg.   her anticoagulation range is 2.5-3.5     Pulmonary HTN from the PEs - she had been seeing Dr Alcala and has been doing v/q scans and echos regualrly. She has a f/u with him 8/10. She denies SOB     Anxiety - on klonopin, zyprexa, paxil - sees psychiatry (dr Ivory). 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 She admits diet is not been good. Weight is the same. She is trying to walk some and also [employer] on [employer]    Vit D def - her vit D levels was 17 when we checked last year. She is taking D    Low normal b12 - her b12 level was 357  she is taking b12 now    Current Outpatient Medications on File Prior to Visit   Medication Sig Dispense Refill   • clonazePAM (KlonoPIN) 0.5 MG tablet Take 0.5 mg by mouth Every Night.     • IRON PO Take  by mouth Daily.     • OLANZapine (zyPREXA) 2.5 MG tablet Take 0.625 mg by mouth Every Night.     • omeprazole (priLOSEC) 20 MG capsule Take 1 capsule by mouth Daily. 90 capsule 3   • PARoxetine (PAXIL) 20 MG tablet Take 20 mg by mouth Daily.     • hydroquinone 4 % cream 1 application Daily As Needed.     • triamcinolone (KENALOG) 0.1 % ointment 1 application Daily As Needed.     • warfarin (COUMADIN) 2.5 MG tablet Take 1 tablet by mouth Daily. as directed 30 tablet 5   • [DISCONTINUED] warfarin (COUMADIN) 5 MG tablet TAKE 1 TABLET BY MOUTH DAILY 30 tablet 0   • [DISCONTINUED] warfarin (COUMADIN) 7.5 MG tablet 1 qd as directed 30 tablet 0     No current facility-administered medications on file prior to visit.        The following  "portions of the patient's history were reviewed and updated as appropriate: allergies, current medications, past family history, past medical history, past social history, past surgical history and problem list.    Review of Systems   Constitutional: Positive for fatigue. Negative for activity change, appetite change, fever, unexpected weight gain and unexpected weight loss.   Respiratory: Negative for shortness of breath.    Cardiovascular: Negative for chest pain.   Genitourinary: Positive for urinary incontinence (better). Negative for frequency.   Skin: Negative.    Allergic/Immunologic: Negative for immunocompromised state.   Neurological: Negative for seizures, memory problem and confusion.   Psychiatric/Behavioral: Positive for dysphoric mood, depressed mood and stress. Negative for agitation.       Objective   /68 (BP Location: Right arm, Patient Position: Sitting)   Pulse 98   Temp 97.3 °F (36.3 °C) (Infrared)   Ht 159.4 cm (62.75\")   Wt 80.6 kg (177 lb 12.8 oz)   SpO2 98%   BMI 31.75 kg/m²   Physical Exam   Constitutional: She is oriented to person, place, and time. She appears well-developed and well-nourished. No distress.   HENT:   Head: Normocephalic and atraumatic.   Eyes: Pupils are equal, round, and reactive to light. Conjunctivae and EOM are normal. Right eye exhibits no discharge. Left eye exhibits no discharge.   Neck: Normal range of motion. Neck supple.   Cardiovascular: Normal rate and regular rhythm.   Pulmonary/Chest: Effort normal and breath sounds normal.   Musculoskeletal: She exhibits no edema.   Neurological: She is alert and oriented to person, place, and time. No cranial nerve deficit.   Skin: Skin is warm and dry. No rash noted. She is not diaphoretic.   Psychiatric: She has a normal mood and affect. Her behavior is normal. Judgment and thought content normal.   Nursing note and vitals reviewed.        Assessment/Plan   Bailey was seen today for dvt, anxiety and med " refill.    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)- stable, followed by Dr Alcala    Ductal carcinoma in situ (DCIS) of right breast- repeat syd due 12/20    History of pulmonary embolism  -     POC INR    Chronic anticoagulation- INR in range, continue same dose, recheck in 4 weeks  -     CBC & Differential; Future  -     Comprehensive Metabolic Panel; Future  -     TSH; Future    B12 deficiency- recheck today  -     Vitamin B12; Future    Vitamin D deficiency- recheck today  -     Vitamin D 25 Hydroxy; Future    Elevated glucose- check today, work on diet  -     Hemoglobin A1c; Future  -     TSH; Future  -     Lipid Panel; Future    Other orders  -     warfarin (COUMADIN) 5 MG tablet; Take 1 tablet by mouth Daily.  -     warfarin (COUMADIN) 7.5 MG tablet; 1 qd as directed      Order give for PT to work on balance/strength

## 2020-08-11 PROBLEM — E11.9 DIABETES MELLITUS: Status: ACTIVE | Noted: 2020-07-30

## 2020-09-08 ENCOUNTER — TELEPHONE (OUTPATIENT)
Dept: INTERNAL MEDICINE | Facility: CLINIC | Age: 70
End: 2020-09-08

## 2020-09-08 ENCOUNTER — CLINICAL SUPPORT (OUTPATIENT)
Dept: INTERNAL MEDICINE | Facility: CLINIC | Age: 70
End: 2020-09-08

## 2020-09-08 VITALS — TEMPERATURE: 97.7 F

## 2020-09-08 DIAGNOSIS — Z86.718 HISTORY OF THROMBOEMBOLISM: Primary | ICD-10-CM

## 2020-09-08 LAB — INR PPP: 4.3 (ref 2–3)

## 2020-09-08 PROCEDURE — 85610 PROTHROMBIN TIME: CPT | Performed by: INTERNAL MEDICINE

## 2020-09-08 PROCEDURE — 36416 COLLJ CAPILLARY BLOOD SPEC: CPT | Performed by: INTERNAL MEDICINE

## 2020-09-08 PROCEDURE — 93793 ANTICOAG MGMT PT WARFARIN: CPT | Performed by: INTERNAL MEDICINE

## 2020-09-08 NOTE — TELEPHONE ENCOUNTER
----- Message from Miriam Buenrostro MD sent at 9/8/2020  9:54 AM EDT -----      ----- Message -----  From: Odalys Huizar MA  Sent: 9/8/2020   9:25 AM EDT  To: Miriam Buenrostro MD    INR today was 4.3.  She is taking 7.5 mg x 6 days and 5 mg x one day.

## 2020-09-08 NOTE — TELEPHONE ENCOUNTER
Let's go down to 7.5mg 5 days a week, and 5mg 2 days a week and recheck next Friday.  Also, if she can schedule a f/u appt in November to recheck her sugar

## 2020-09-08 NOTE — PROGRESS NOTES
Let's go down to 7.5mg 5 days a week, and 5mg 2 days a week and recheck next Friday  Also, if she can schedule a f/u appt in November to recheck her sugar

## 2020-09-18 ENCOUNTER — CLINICAL SUPPORT (OUTPATIENT)
Dept: INTERNAL MEDICINE | Facility: CLINIC | Age: 70
End: 2020-09-18

## 2020-09-18 ENCOUNTER — TELEPHONE (OUTPATIENT)
Dept: INTERNAL MEDICINE | Facility: CLINIC | Age: 70
End: 2020-09-18

## 2020-09-18 LAB — INR PPP: 4 (ref 0.9–1.1)

## 2020-09-18 PROCEDURE — 36416 COLLJ CAPILLARY BLOOD SPEC: CPT | Performed by: INTERNAL MEDICINE

## 2020-09-18 PROCEDURE — 93793 ANTICOAG MGMT PT WARFARIN: CPT | Performed by: INTERNAL MEDICINE

## 2020-09-18 PROCEDURE — 85610 PROTHROMBIN TIME: CPT | Performed by: INTERNAL MEDICINE

## 2020-09-18 NOTE — TELEPHONE ENCOUNTER
INR a little high - Let's have her change to taking 5mg 3 days a week, and 7.5 mg 4 days a week, and rechecking in 2 weeks

## 2020-09-18 NOTE — TELEPHONE ENCOUNTER
Pt present for INR check.  Reading of 4.0 was obtained.  She takes 5 mg two days a week and 7.5 mg five days per week.  Today dose is 7.5

## 2020-10-02 ENCOUNTER — CLINICAL SUPPORT (OUTPATIENT)
Dept: INTERNAL MEDICINE | Facility: CLINIC | Age: 70
End: 2020-10-02

## 2020-10-02 DIAGNOSIS — I26.99: Primary | ICD-10-CM

## 2020-10-02 DIAGNOSIS — Z86.711 HISTORY OF PULMONARY EMBOLISM: ICD-10-CM

## 2020-10-02 LAB — INR PPP: 2.7 (ref 2.5–3.5)

## 2020-10-02 PROCEDURE — 85610 PROTHROMBIN TIME: CPT | Performed by: INTERNAL MEDICINE

## 2020-10-02 PROCEDURE — 36416 COLLJ CAPILLARY BLOOD SPEC: CPT | Performed by: INTERNAL MEDICINE

## 2020-10-05 ENCOUNTER — TELEPHONE (OUTPATIENT)
Dept: INTERNAL MEDICINE | Facility: CLINIC | Age: 70
End: 2020-10-05

## 2020-10-05 NOTE — TELEPHONE ENCOUNTER
----- Message from Miriam Buenrostro MD sent at 10/5/2020  7:13 AM EDT -----      ----- Message -----  From: Odalys Huizar MA  Sent: 10/2/2020  10:22 AM EDT  To: Miriam Buenrostro MD    INR today was 2.7.  She takes 5 mg x 3 days (M,W,F) and 4 days at 7.5 mg.

## 2020-10-30 ENCOUNTER — CLINICAL SUPPORT (OUTPATIENT)
Dept: INTERNAL MEDICINE | Facility: CLINIC | Age: 70
End: 2020-10-30

## 2020-10-30 ENCOUNTER — TELEPHONE (OUTPATIENT)
Dept: INTERNAL MEDICINE | Facility: CLINIC | Age: 70
End: 2020-10-30

## 2020-10-30 DIAGNOSIS — Z86.711 HISTORY OF PULMONARY EMBOLISM: Primary | ICD-10-CM

## 2020-10-30 LAB — INR PPP: 2.6 (ref 2.5–3.5)

## 2020-10-30 PROCEDURE — 36416 COLLJ CAPILLARY BLOOD SPEC: CPT | Performed by: INTERNAL MEDICINE

## 2020-10-30 PROCEDURE — 85610 PROTHROMBIN TIME: CPT | Performed by: INTERNAL MEDICINE

## 2020-10-30 NOTE — TELEPHONE ENCOUNTER
----- Message from Miriam Buenrostro MD sent at 10/30/2020  8:41 AM EDT -----      ----- Message -----  From: Odalys Huizar MA  Sent: 10/30/2020   8:28 AM EDT  To: Miriam Buenrostro MD    INR today was 2.6 and she is taking 7.5 x 4 days and 5 mg x 3 days.

## 2020-11-06 ENCOUNTER — TRANSCRIBE ORDERS (OUTPATIENT)
Dept: ADMINISTRATIVE | Facility: HOSPITAL | Age: 70
End: 2020-11-06

## 2020-11-06 DIAGNOSIS — R92.8 ABNORMAL MAMMOGRAM: Primary | ICD-10-CM

## 2020-11-10 RX ORDER — OMEPRAZOLE 20 MG/1
20 CAPSULE, DELAYED RELEASE ORAL DAILY
Qty: 90 CAPSULE | Refills: 3 | Status: SHIPPED | OUTPATIENT
Start: 2020-11-10 | End: 2021-11-01

## 2020-11-28 PROBLEM — E11.9 TYPE 2 DIABETES, DIET CONTROLLED: Status: ACTIVE | Noted: 2020-11-28

## 2020-12-02 ENCOUNTER — OFFICE VISIT (OUTPATIENT)
Dept: INTERNAL MEDICINE | Facility: CLINIC | Age: 70
End: 2020-12-02

## 2020-12-02 VITALS
TEMPERATURE: 98.4 F | HEART RATE: 98 BPM | DIASTOLIC BLOOD PRESSURE: 74 MMHG | OXYGEN SATURATION: 99 % | SYSTOLIC BLOOD PRESSURE: 124 MMHG | WEIGHT: 179 LBS | BODY MASS INDEX: 31.71 KG/M2 | HEIGHT: 63 IN

## 2020-12-02 DIAGNOSIS — E11.9 TYPE 2 DIABETES, DIET CONTROLLED (HCC): Primary | ICD-10-CM

## 2020-12-02 DIAGNOSIS — I26.99 PULMONARY HYPERTENSION DUE TO THROMBOEMBOLISM (HCC): ICD-10-CM

## 2020-12-02 DIAGNOSIS — I27.29 PULMONARY HYPERTENSION DUE TO THROMBOEMBOLISM (HCC): ICD-10-CM

## 2020-12-02 LAB
HBA1C MFR BLD: 6.9 %
INR PPP: 3 (ref 2.5–3.5)

## 2020-12-02 PROCEDURE — 36416 COLLJ CAPILLARY BLOOD SPEC: CPT | Performed by: INTERNAL MEDICINE

## 2020-12-02 PROCEDURE — 99214 OFFICE O/P EST MOD 30 MIN: CPT | Performed by: INTERNAL MEDICINE

## 2020-12-02 PROCEDURE — 83036 HEMOGLOBIN GLYCOSYLATED A1C: CPT | Performed by: INTERNAL MEDICINE

## 2020-12-02 PROCEDURE — 85610 PROTHROMBIN TIME: CPT | Performed by: INTERNAL MEDICINE

## 2020-12-23 ENCOUNTER — HOSPITAL ENCOUNTER (OUTPATIENT)
Dept: MAMMOGRAPHY | Facility: HOSPITAL | Age: 70
Discharge: HOME | End: 2020-12-23
Admitting: SURGERY

## 2020-12-23 DIAGNOSIS — R92.8 ABNORMAL MAMMOGRAM: ICD-10-CM

## 2020-12-23 PROCEDURE — 77066 DX MAMMO INCL CAD BI: CPT | Performed by: RADIOLOGY

## 2020-12-23 PROCEDURE — 77066 DX MAMMO INCL CAD BI: CPT

## 2020-12-23 PROCEDURE — G0279 TOMOSYNTHESIS, MAMMO: HCPCS | Performed by: RADIOLOGY

## 2020-12-23 PROCEDURE — G0279 TOMOSYNTHESIS, MAMMO: HCPCS

## 2020-12-30 ENCOUNTER — TELEPHONE (OUTPATIENT)
Dept: INTERNAL MEDICINE | Facility: CLINIC | Age: 70
End: 2020-12-30

## 2020-12-30 ENCOUNTER — CLINICAL SUPPORT (OUTPATIENT)
Dept: INTERNAL MEDICINE | Facility: CLINIC | Age: 70
End: 2020-12-30

## 2020-12-30 DIAGNOSIS — Z86.711 HISTORY OF PULMONARY EMBOLISM: Primary | ICD-10-CM

## 2020-12-30 LAB — INR PPP: 3.2 (ref 2.5–3.5)

## 2020-12-30 PROCEDURE — 36416 COLLJ CAPILLARY BLOOD SPEC: CPT | Performed by: INTERNAL MEDICINE

## 2020-12-30 PROCEDURE — 85610 PROTHROMBIN TIME: CPT | Performed by: INTERNAL MEDICINE

## 2021-01-28 ENCOUNTER — TELEPHONE (OUTPATIENT)
Dept: INTERNAL MEDICINE | Facility: CLINIC | Age: 71
End: 2021-01-28

## 2021-01-28 NOTE — TELEPHONE ENCOUNTER
----- Message from Miriam Buenrostro MD sent at 1/28/2021  7:50 AM EST -----  Regarding: INR  Can you let her know she is due for her INR in the next week if she could come by

## 2021-02-05 ENCOUNTER — CLINICAL SUPPORT (OUTPATIENT)
Dept: INTERNAL MEDICINE | Facility: CLINIC | Age: 71
End: 2021-02-05

## 2021-02-05 LAB — INR PPP: 2.5 (ref 2.5–3.5)

## 2021-02-05 PROCEDURE — 93793 ANTICOAG MGMT PT WARFARIN: CPT | Performed by: INTERNAL MEDICINE

## 2021-02-05 PROCEDURE — 85610 PROTHROMBIN TIME: CPT | Performed by: INTERNAL MEDICINE

## 2021-02-05 PROCEDURE — 36416 COLLJ CAPILLARY BLOOD SPEC: CPT | Performed by: INTERNAL MEDICINE

## 2021-02-20 NOTE — PROGRESS NOTES
Here for physical    Exercise: not a lot. Had been going to gym before covid.she is working w/ a PT once a week working more on flexibility though but once weather is better she will try to walk more regularly.  Diet: tries to eat healthy and doing okay overall.  She is on D, B12, iron. She tries to get dairy in daily    DMT2 - last a1c 2 months ago was 6.9.     Pulmonary hypertension-she did see Dr. Alcala last month and had VQ scan and was told everything looks stable.  Follow-up in 1 year          Current Outpatient Medications:   •  clonazePAM (KlonoPIN) 0.5 MG tablet, Take 0.5 mg by mouth Every Night., Disp: , Rfl:   •  hydroquinone 4 % cream, 1 application Daily As Needed., Disp: , Rfl:   •  IRON PO, Take  by mouth Daily., Disp: , Rfl:   •  OLANZapine (zyPREXA) 2.5 MG tablet, Take 0.625 mg by mouth Every Night., Disp: , Rfl:   •  omeprazole (priLOSEC) 20 MG capsule, TAKE 1 CAPSULE BY MOUTH DAILY, Disp: 90 capsule, Rfl: 3  •  PARoxetine (PAXIL) 20 MG tablet, Take 20 mg by mouth Daily., Disp: , Rfl:   •  triamcinolone (KENALOG) 0.1 % ointment, 1 application Daily As Needed., Disp: , Rfl:   •  warfarin (COUMADIN) 5 MG tablet, Take 1 tablet by mouth Daily., Disp: 30 tablet, Rfl: 5  •  warfarin (COUMADIN) 7.5 MG tablet, 1 qd as directed, Disp: 30 tablet, Rfl: 4    The following portions of the patient's history were reviewed and updated as appropriate: allergies, current medications, past family history, past medical history, past social history, past surgical history and problem list.    Review of Systems   Constitutional: Negative for activity change, appetite change, fever, unexpected weight gain and unexpected weight loss.   HENT: Negative.    Eyes: Negative.    Respiratory: Negative for shortness of breath and wheezing.    Cardiovascular: Negative for chest pain, palpitations and leg swelling.   Gastrointestinal: Negative.    Endocrine: Negative.    Genitourinary: Negative for difficulty urinating and dysuria.  "  Skin: Negative.    Allergic/Immunologic: Negative for immunocompromised state.   Neurological: Negative for seizures, speech difficulty, memory problem and confusion.   Hematological: Does not bruise/bleed easily.   Psychiatric/Behavioral: Negative for agitation.         Objective    /82   Pulse 98   Temp 97.3 °F (36.3 °C) (Infrared)   Resp 18   Ht 158.8 cm (62.5\")   Wt 83.6 kg (184 lb 6.4 oz)   SpO2 98%   BMI 33.19 kg/m²   Physical Exam   Physical Exam  Vitals signs and nursing note reviewed.   Constitutional:       General: She is not in acute distress.     Appearance: She is well-developed. She is not diaphoretic.   HENT:      Head: Normocephalic and atraumatic.      Right Ear: External ear normal.      Left Ear: Tympanic membrane, ear canal and external ear normal.      Nose: Nose normal.      Mouth/Throat:      Pharynx: No oropharyngeal exudate.   Eyes:      General: No scleral icterus.        Right eye: No discharge.         Left eye: No discharge.      Conjunctiva/sclera: Conjunctivae normal.      Pupils: Pupils are equal, round, and reactive to light.   Neck:      Musculoskeletal: Normal range of motion and neck supple.      Thyroid: No thyromegaly.   Cardiovascular:      Rate and Rhythm: Normal rate and regular rhythm.      Heart sounds: Normal heart sounds. No murmur. No friction rub. No gallop.    Pulmonary:      Effort: Pulmonary effort is normal. No respiratory distress.      Breath sounds: Normal breath sounds. No wheezing or rales.   Chest:      Breasts:         Right: No mass, nipple discharge, skin change or tenderness.         Left: No mass, nipple discharge, skin change or tenderness.   Abdominal:      General: Bowel sounds are normal. There is no distension.      Palpations: Abdomen is soft. There is no mass.      Tenderness: There is no abdominal tenderness. There is no guarding or rebound.   Musculoskeletal: Normal range of motion.         General: No deformity. "   Lymphadenopathy:      Cervical: No cervical adenopathy.   Skin:     General: Skin is warm and dry.      Coloration: Skin is not pale.      Findings: No erythema or rash.   Neurological:      Mental Status: She is alert and oriented to person, place, and time.      Coordination: Coordination normal.      Deep Tendon Reflexes: Reflexes normal.   Psychiatric:         Behavior: Behavior normal.         Thought Content: Thought content normal.         Judgment: Judgment normal.           INR 3.2    Assessment/Plan   Diagnoses and all orders for this visit:    1. Routine general medical examination at a health care facility (Primary)  Regular exercise/healthy diet. BSE q month. Sunscreen use encouraged. calcium intake reviewed. Check fasting labs  Cait due 6/21 (6m f/u from [facility] in 12/20)  Colon due '24 (Dr Sanchez)  DT due but allergic  DEXA due now - will schedule  She had 1st covid vaccine and will get 2nd soon  Shingles- shingrix discussed -  can check at pharmacy since we do not have   -     Comprehensive Metabolic Panel; Future  -     CBC & Differential; Future  -     Lipid Panel; Future  -     TSH; Future  -     Vitamin D 25 Hydroxy; Future  -     POC Urinalysis Dipstick    2. Ductal carcinoma in situ (DCIS) of right breast-she sees surgeon soon and has every 6-month mammograms    3. Chronic anticoagulation-INR therapeutic.  Recheck in 4 weeks  -     POC INR    4. Type 2 diabetes, diet controlled (CMS/HCC)-A1c will be due next month.  Eye exam is due later this year and she will call to schedule  -     Microalbumin / Creatinine Urine Ratio - Urine, Clean Catch; Future    5. Vitamin D deficiency-check levels today  -     Vitamin D 25 Hydroxy; Future    6. Screening for osteoporosis-check DEXA  -     DEXA Bone Density Axial; Future    7. Need for hepatitis C screening test  -     Hepatitis C Antibody; Future

## 2021-02-24 ENCOUNTER — OFFICE VISIT (OUTPATIENT)
Dept: INTERNAL MEDICINE | Facility: CLINIC | Age: 71
End: 2021-02-24

## 2021-02-24 ENCOUNTER — LAB (OUTPATIENT)
Dept: LAB | Facility: HOSPITAL | Age: 71
End: 2021-02-24

## 2021-02-24 VITALS
RESPIRATION RATE: 18 BRPM | BODY MASS INDEX: 32.67 KG/M2 | HEIGHT: 63 IN | TEMPERATURE: 97.3 F | HEART RATE: 98 BPM | DIASTOLIC BLOOD PRESSURE: 82 MMHG | SYSTOLIC BLOOD PRESSURE: 112 MMHG | WEIGHT: 184.4 LBS | OXYGEN SATURATION: 98 %

## 2021-02-24 DIAGNOSIS — E55.9 VITAMIN D DEFICIENCY: ICD-10-CM

## 2021-02-24 DIAGNOSIS — Z11.59 NEED FOR HEPATITIS C SCREENING TEST: ICD-10-CM

## 2021-02-24 DIAGNOSIS — Z13.820 SCREENING FOR OSTEOPOROSIS: ICD-10-CM

## 2021-02-24 DIAGNOSIS — E11.9 TYPE 2 DIABETES, DIET CONTROLLED (HCC): ICD-10-CM

## 2021-02-24 DIAGNOSIS — Z00.00 ROUTINE GENERAL MEDICAL EXAMINATION AT A HEALTH CARE FACILITY: Primary | ICD-10-CM

## 2021-02-24 DIAGNOSIS — Z79.01 CHRONIC ANTICOAGULATION: ICD-10-CM

## 2021-02-24 DIAGNOSIS — Z00.00 ROUTINE GENERAL MEDICAL EXAMINATION AT A HEALTH CARE FACILITY: ICD-10-CM

## 2021-02-24 DIAGNOSIS — D05.11 DUCTAL CARCINOMA IN SITU (DCIS) OF RIGHT BREAST: ICD-10-CM

## 2021-02-24 LAB
25(OH)D3 SERPL-MCNC: 34 NG/ML (ref 30–100)
ALBUMIN SERPL-MCNC: 4 G/DL (ref 3.5–5.2)
ALBUMIN UR-MCNC: <1.2 MG/DL
ALBUMIN/GLOB SERPL: 1.3 G/DL
ALP SERPL-CCNC: 106 U/L (ref 39–117)
ALT SERPL W P-5'-P-CCNC: 23 U/L (ref 1–33)
ANION GAP SERPL CALCULATED.3IONS-SCNC: 9.6 MMOL/L (ref 5–15)
AST SERPL-CCNC: 29 U/L (ref 1–32)
BASOPHILS # BLD AUTO: 0.04 10*3/MM3 (ref 0–0.2)
BASOPHILS NFR BLD AUTO: 0.8 % (ref 0–1.5)
BILIRUB BLD-MCNC: NEGATIVE MG/DL
BILIRUB SERPL-MCNC: 0.8 MG/DL (ref 0–1.2)
BUN SERPL-MCNC: 11 MG/DL (ref 8–23)
BUN/CREAT SERPL: 14.9 (ref 7–25)
CALCIUM SPEC-SCNC: 9.1 MG/DL (ref 8.6–10.5)
CHLORIDE SERPL-SCNC: 100 MMOL/L (ref 98–107)
CHOLEST SERPL-MCNC: 184 MG/DL (ref 0–200)
CLARITY, POC: CLEAR
CO2 SERPL-SCNC: 27.4 MMOL/L (ref 22–29)
COLOR UR: YELLOW
CREAT SERPL-MCNC: 0.74 MG/DL (ref 0.57–1)
CREAT UR-MCNC: 118.1 MG/DL
DEPRECATED RDW RBC AUTO: 44.5 FL (ref 37–54)
EOSINOPHIL # BLD AUTO: 0.23 10*3/MM3 (ref 0–0.4)
EOSINOPHIL NFR BLD AUTO: 4.5 % (ref 0.3–6.2)
ERYTHROCYTE [DISTWIDTH] IN BLOOD BY AUTOMATED COUNT: 13.1 % (ref 12.3–15.4)
EXPIRATION DATE: ABNORMAL
EXPIRATION DATE: NORMAL
GFR SERPL CREATININE-BSD FRML MDRD: 77 ML/MIN/1.73
GLOBULIN UR ELPH-MCNC: 3.2 GM/DL
GLUCOSE SERPL-MCNC: 145 MG/DL (ref 65–99)
GLUCOSE UR STRIP-MCNC: NEGATIVE MG/DL
HCT VFR BLD AUTO: 40.5 % (ref 34–46.6)
HCV AB SER DONR QL: NORMAL
HDLC SERPL-MCNC: 37 MG/DL (ref 40–60)
HGB BLD-MCNC: 13.2 G/DL (ref 12–15.9)
IMM GRANULOCYTES # BLD AUTO: 0.01 10*3/MM3 (ref 0–0.05)
IMM GRANULOCYTES NFR BLD AUTO: 0.2 % (ref 0–0.5)
INR PPP: 3.2 (ref 0.9–1.1)
KETONES UR QL: NEGATIVE
LDLC SERPL CALC-MCNC: 118 MG/DL (ref 0–100)
LDLC/HDLC SERPL: 3.09 {RATIO}
LEUKOCYTE EST, POC: NEGATIVE
LYMPHOCYTES # BLD AUTO: 1.4 10*3/MM3 (ref 0.7–3.1)
LYMPHOCYTES NFR BLD AUTO: 27.7 % (ref 19.6–45.3)
Lab: ABNORMAL
Lab: NORMAL
MCH RBC QN AUTO: 29.8 PG (ref 26.6–33)
MCHC RBC AUTO-ENTMCNC: 32.6 G/DL (ref 31.5–35.7)
MCV RBC AUTO: 91.4 FL (ref 79–97)
MICROALBUMIN/CREAT UR: NORMAL MG/G{CREAT}
MONOCYTES # BLD AUTO: 0.44 10*3/MM3 (ref 0.1–0.9)
MONOCYTES NFR BLD AUTO: 8.7 % (ref 5–12)
NEUTROPHILS NFR BLD AUTO: 2.94 10*3/MM3 (ref 1.7–7)
NEUTROPHILS NFR BLD AUTO: 58.1 % (ref 42.7–76)
NITRITE UR-MCNC: NEGATIVE MG/ML
NRBC BLD AUTO-RTO: 0 /100 WBC (ref 0–0.2)
PH UR: 5 [PH] (ref 5–8)
PLATELET # BLD AUTO: 247 10*3/MM3 (ref 140–450)
PMV BLD AUTO: 11 FL (ref 6–12)
POTASSIUM SERPL-SCNC: 4.1 MMOL/L (ref 3.5–5.2)
PROT SERPL-MCNC: 7.2 G/DL (ref 6–8.5)
PROT UR STRIP-MCNC: NEGATIVE MG/DL
RBC # BLD AUTO: 4.43 10*6/MM3 (ref 3.77–5.28)
RBC # UR STRIP: NEGATIVE /UL
SODIUM SERPL-SCNC: 137 MMOL/L (ref 136–145)
SP GR UR: 1.02 (ref 1–1.03)
TRIGL SERPL-MCNC: 163 MG/DL (ref 0–150)
TSH SERPL DL<=0.05 MIU/L-ACNC: 2.18 UIU/ML (ref 0.27–4.2)
UROBILINOGEN UR QL: NORMAL
VLDLC SERPL-MCNC: 29 MG/DL (ref 5–40)
WBC # BLD AUTO: 5.06 10*3/MM3 (ref 3.4–10.8)

## 2021-02-24 PROCEDURE — 84443 ASSAY THYROID STIM HORMONE: CPT | Performed by: INTERNAL MEDICINE

## 2021-02-24 PROCEDURE — 81002 URINALYSIS NONAUTO W/O SCOPE: CPT | Performed by: INTERNAL MEDICINE

## 2021-02-24 PROCEDURE — 82306 VITAMIN D 25 HYDROXY: CPT | Performed by: INTERNAL MEDICINE

## 2021-02-24 PROCEDURE — 80061 LIPID PANEL: CPT | Performed by: INTERNAL MEDICINE

## 2021-02-24 PROCEDURE — 82043 UR ALBUMIN QUANTITATIVE: CPT | Performed by: INTERNAL MEDICINE

## 2021-02-24 PROCEDURE — 99397 PER PM REEVAL EST PAT 65+ YR: CPT | Performed by: INTERNAL MEDICINE

## 2021-02-24 PROCEDURE — 36416 COLLJ CAPILLARY BLOOD SPEC: CPT | Performed by: INTERNAL MEDICINE

## 2021-02-24 PROCEDURE — 85610 PROTHROMBIN TIME: CPT | Performed by: INTERNAL MEDICINE

## 2021-02-24 PROCEDURE — 85025 COMPLETE CBC W/AUTO DIFF WBC: CPT | Performed by: INTERNAL MEDICINE

## 2021-02-24 PROCEDURE — 86803 HEPATITIS C AB TEST: CPT | Performed by: INTERNAL MEDICINE

## 2021-02-24 PROCEDURE — 80053 COMPREHEN METABOLIC PANEL: CPT | Performed by: INTERNAL MEDICINE

## 2021-02-24 PROCEDURE — 82570 ASSAY OF URINE CREATININE: CPT | Performed by: INTERNAL MEDICINE

## 2021-03-25 ENCOUNTER — CLINICAL SUPPORT (OUTPATIENT)
Dept: INTERNAL MEDICINE | Facility: CLINIC | Age: 71
End: 2021-03-25

## 2021-03-25 ENCOUNTER — TELEPHONE (OUTPATIENT)
Dept: INTERNAL MEDICINE | Facility: CLINIC | Age: 71
End: 2021-03-25

## 2021-03-25 DIAGNOSIS — Z86.711 HISTORY OF PULMONARY EMBOLISM: Primary | ICD-10-CM

## 2021-03-25 LAB — INR PPP: 2.7 (ref 2–3)

## 2021-03-25 PROCEDURE — 85610 PROTHROMBIN TIME: CPT | Performed by: INTERNAL MEDICINE

## 2021-03-25 PROCEDURE — 36416 COLLJ CAPILLARY BLOOD SPEC: CPT | Performed by: INTERNAL MEDICINE

## 2021-03-25 NOTE — TELEPHONE ENCOUNTER
----- Message from Miriam Buenrostro MD sent at 3/25/2021  8:50 AM EDT -----      ----- Message -----  From: Odalys Huizar MA  Sent: 3/25/2021   8:47 AM EDT  To: Miriam Buenrostro MD    INR is 2.7, taking 7.5 mg x 4 days and 5 mg x 3 days.

## 2021-03-25 NOTE — TELEPHONE ENCOUNTER
INR looks good-let us continue same dose and recheck in 1 month.    PN and verbalized understanding.

## 2021-04-28 ENCOUNTER — OFFICE VISIT (OUTPATIENT)
Dept: INTERNAL MEDICINE | Facility: CLINIC | Age: 71
End: 2021-04-28

## 2021-04-28 VITALS
SYSTOLIC BLOOD PRESSURE: 118 MMHG | HEIGHT: 63 IN | OXYGEN SATURATION: 99 % | TEMPERATURE: 97.5 F | HEART RATE: 87 BPM | DIASTOLIC BLOOD PRESSURE: 66 MMHG | BODY MASS INDEX: 32.5 KG/M2 | WEIGHT: 183.4 LBS

## 2021-04-28 DIAGNOSIS — E11.9 TYPE 2 DIABETES, DIET CONTROLLED (HCC): Primary | Chronic | ICD-10-CM

## 2021-04-28 DIAGNOSIS — I26.99 PULMONARY HYPERTENSION DUE TO THROMBOEMBOLISM (HCC): Chronic | ICD-10-CM

## 2021-04-28 DIAGNOSIS — I27.29 PULMONARY HYPERTENSION DUE TO THROMBOEMBOLISM (HCC): Chronic | ICD-10-CM

## 2021-04-28 DIAGNOSIS — E78.00 PURE HYPERCHOLESTEROLEMIA: Chronic | ICD-10-CM

## 2021-04-28 LAB
HBA1C MFR BLD: 7.9 %
INR PPP: 2.4 (ref 2.5–3.5)

## 2021-04-28 PROCEDURE — 99214 OFFICE O/P EST MOD 30 MIN: CPT | Performed by: INTERNAL MEDICINE

## 2021-04-28 PROCEDURE — 85610 PROTHROMBIN TIME: CPT | Performed by: INTERNAL MEDICINE

## 2021-04-28 PROCEDURE — 83036 HEMOGLOBIN GLYCOSYLATED A1C: CPT | Performed by: INTERNAL MEDICINE

## 2021-04-28 PROCEDURE — 36416 COLLJ CAPILLARY BLOOD SPEC: CPT | Performed by: INTERNAL MEDICINE

## 2021-04-28 NOTE — PROGRESS NOTES
"Chief Complaint  Hyperlipidemia, Diabetes, pulmonary hypertension, Med Refill, and Coagulation Disorder (2.4 takes 5 mg x 3 days and 7.5 mg x 4 days.)    Subjective          Bailey Armstrong presents to [facility]  History of Present Illness    Diabetes mellitus type 2-last A1c was done 4 months ago and was 6.9. she feels like she has cut out sweets.  Does tend to eat cereal for breakfast, rice dish w/ veggies(frozen meal), and fruit for dinner. Has been active  W/ cleaning    Hyperlipidemia-her cholesterol levels looked much improved 2 months ago    Pulmonary hypertension-she saw Dr. Alcala in 1/21 and will see him again next year    History of pulmonary embolism-on warfarin and currently is on 5 mg 3 days a week and 7.5 mg 4 days a week.  Her INR is 2.4 today.  Goal is 2.5-3.5  Last INR was 2.7 a month ago  No missed doses of warfarin      Current Outpatient Medications:   •  clonazePAM (KlonoPIN) 0.5 MG tablet, Take 0.5 mg by mouth Every Night., Disp: , Rfl:   •  hydroquinone 4 % cream, 1 application Daily As Needed., Disp: , Rfl:   •  IRON PO, Take  by mouth Daily., Disp: , Rfl:   •  OLANZapine (zyPREXA) 2.5 MG tablet, Take 0.625 mg by mouth Every Night., Disp: , Rfl:   •  omeprazole (priLOSEC) 20 MG capsule, TAKE 1 CAPSULE BY MOUTH DAILY, Disp: 90 capsule, Rfl: 3  •  PARoxetine (PAXIL) 20 MG tablet, Take 20 mg by mouth Daily., Disp: , Rfl:   •  triamcinolone (KENALOG) 0.1 % ointment, 1 application Daily As Needed., Disp: , Rfl:   •  warfarin (COUMADIN) 5 MG tablet, Take 1 tablet by mouth Daily., Disp: 30 tablet, Rfl: 5  •  warfarin (COUMADIN) 7.5 MG tablet, 1 qd as directed, Disp: 30 tablet, Rfl: 4      Objective   Vital Signs:   /66 (BP Location: Left arm, Patient Position: Sitting)   Pulse 87   Temp 97.5 °F (36.4 °C) (Infrared)   Ht 158.8 cm (62.5\")   Wt 83.2 kg (183 lb 6.4 oz)   SpO2 99%   BMI 33.01 kg/m²       Physical exam  Constitutional: oriented to person, place, " and time.  well-developed and well-nourished. No distress.   HENT:   Head: Normocephalic and atraumatic.   Eyes: Conjunctivae and EOM are normal.   Cardiovascular: Normal rate, regular rhythm and normal heart sounds.  Exam reveals no gallop and no friction rub.    No murmur heard.  Pulmonary/Chest: Effort normal and breath sounds normal. No respiratory distress.   no wheezes.   Neurological:  alert and oriented to person, place, and time.   Skin: Skin is warm and dry. not diaphoretic.   Psychiatric:  normal mood and affect. behavior is normal. Judgment and thought content normal.      Result Review :   The following data was reviewed by: Miriam Buenrostro MD on 04/28/2021:  CMP    CMP 8/5/20 2/24/21   Glucose 131 (A) 145 (A)   BUN 8 11   Creatinine 0.83 0.74   eGFR Non African Am 68 77   Sodium 139 137   Potassium 4.3 4.1   Chloride 100 100   Calcium 9.5 9.1   Albumin 4.30 4.00   Total Bilirubin 1.4 (A) 0.8   Alkaline Phosphatase 96 106   AST (SGOT) 34 (A) 29   ALT (SGPT) 31 23   (A) Abnormal value            TSH    TSH 8/5/20 2/24/21   TSH 2.500 2.180           A1C Last 3 Results    HGBA1C Last 3 Results 8/5/20 12/2/20 4/28/21   Hemoglobin A1C 7.32 (A) 6.9 7.9   (A) Abnormal value            Microalbumin    Microalbumin 2/24/21   Microalbumin, Urine <1.2           Lab Results   Component Value Date    UEBU10XC 34.0 02/24/2021    [id number] 818 08/05/2020               Assessment and Plan    Diagnoses and all orders for this visit:    1. Type 2 diabetes, diet controlled (CMS/HCC) (Primary)  Comments:  a1c is up. we discussed diet in detail - increasing protein, cutting back on carbs. she does not want to see dietician yet. continue exercise. She also decline metformin, but if a1c is no better next visit, we will likely start this  Orders:  -     POC Glycosylated Hemoglobin (Hb A1C)    2. 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  Pure hypercholesterolemia  Comments:  we will recheck at physical        Follow Up   Return in about 3 months (around 7/28/2021) for Next scheduled follow up.  Patient was given instructions and counseling regarding her condition or for health maintenance advice. Please see specific information pulled into the AVS if appropriate.

## 2021-05-03 NOTE — TELEPHONE ENCOUNTER
----- Message from Miriam Buenrostro MD sent at 12/30/2020  1:24 PM EST -----      ----- Message -----  From: Odalys Huizar MA  Sent: 12/30/2020   9:48 AM EST  To: Miriam Buenrostro MD    INR today is 3.2 she is taking 7.5 mg x 4 days and 5 mg x 3 days.   Refill refused, appointment required for additional refills.

## 2021-05-05 RX ORDER — WARFARIN SODIUM 7.5 MG/1
TABLET ORAL
Qty: 30 TABLET | Refills: 5 | Status: SHIPPED | OUTPATIENT
Start: 2021-05-05 | End: 2022-04-08

## 2021-05-05 NOTE — TELEPHONE ENCOUNTER
LV: 4/28/21 and INR, NV: 6/2/21    Faustina is requesting a refill on her warfarin Sod 7.5 mg tablets (yellow).

## 2021-05-11 ENCOUNTER — HOSPITAL ENCOUNTER (OUTPATIENT)
Dept: BONE DENSITY | Facility: HOSPITAL | Age: 71
Discharge: HOME | End: 2021-05-11
Admitting: INTERNAL MEDICINE

## 2021-05-11 DIAGNOSIS — Z13.820 SCREENING FOR OSTEOPOROSIS: ICD-10-CM

## 2021-05-11 PROCEDURE — 77080 DXA BONE DENSITY AXIAL: CPT

## 2021-05-13 ENCOUNTER — TRANSCRIBE ORDERS (OUTPATIENT)
Dept: ADMINISTRATIVE | Facility: HOSPITAL | Age: 71
End: 2021-05-13

## 2021-05-13 ENCOUNTER — TELEPHONE (OUTPATIENT)
Dept: INTERNAL MEDICINE | Facility: CLINIC | Age: 71
End: 2021-05-13

## 2021-05-13 ENCOUNTER — CLINICAL SUPPORT (OUTPATIENT)
Dept: INTERNAL MEDICINE | Facility: CLINIC | Age: 71
End: 2021-05-13

## 2021-05-13 DIAGNOSIS — Z86.711 HISTORY OF PULMONARY EMBOLISM: Primary | ICD-10-CM

## 2021-05-13 DIAGNOSIS — R92.8 ABNORMAL MAMMOGRAM: Primary | ICD-10-CM

## 2021-05-13 LAB — INR PPP: 3.2 (ref 2–3.5)

## 2021-05-13 PROCEDURE — 36416 COLLJ CAPILLARY BLOOD SPEC: CPT | Performed by: INTERNAL MEDICINE

## 2021-05-13 PROCEDURE — 85610 PROTHROMBIN TIME: CPT | Performed by: INTERNAL MEDICINE

## 2021-05-13 NOTE — TELEPHONE ENCOUNTER
----- Message from Miriam Buenrostro MD sent at 5/13/2021  8:53 AM EDT -----      ----- Message -----  From: Kesha Harvey  Sent: 5/13/2021   8:42 AM EDT  To: Miriam Buenrostro MD    Pt would also like a call with bone density results along with the letter

## 2021-05-25 VITALS
HEART RATE: 91 BPM | RESPIRATION RATE: 18 BRPM | TEMPERATURE: 98.2 F | HEART RATE: 100 BPM | RESPIRATION RATE: 16 BRPM | SYSTOLIC BLOOD PRESSURE: 100 MMHG | WEIGHT: 156.09 LBS | BODY MASS INDEX: 27.26 KG/M2 | SYSTOLIC BLOOD PRESSURE: 88 MMHG | SYSTOLIC BLOOD PRESSURE: 106 MMHG | DIASTOLIC BLOOD PRESSURE: 70 MMHG | HEART RATE: 94 BPM | TEMPERATURE: 98.2 F | RESPIRATION RATE: 16 BRPM | HEART RATE: 96 BPM | TEMPERATURE: 99.1 F | WEIGHT: 149.69 LBS | BODY MASS INDEX: 27.22 KG/M2 | TEMPERATURE: 97.9 F | OXYGEN SATURATION: 95 % | SYSTOLIC BLOOD PRESSURE: 98 MMHG | RESPIRATION RATE: 18 BRPM | OXYGEN SATURATION: 95 % | DIASTOLIC BLOOD PRESSURE: 62 MMHG | WEIGHT: 144.84 LBS | TEMPERATURE: 97.5 F | SYSTOLIC BLOOD PRESSURE: 105 MMHG | BODY MASS INDEX: 26.52 KG/M2 | WEIGHT: 159.7 LBS | HEART RATE: 96 BPM | DIASTOLIC BLOOD PRESSURE: 64 MMHG | TEMPERATURE: 98.1 F | HEIGHT: 63 IN | OXYGEN SATURATION: 97 % | TEMPERATURE: 98.1 F | OXYGEN SATURATION: 94 % | HEART RATE: 99 BPM | DIASTOLIC BLOOD PRESSURE: 60 MMHG | OXYGEN SATURATION: 100 % | OXYGEN SATURATION: 94 % | SYSTOLIC BLOOD PRESSURE: 118 MMHG | DIASTOLIC BLOOD PRESSURE: 70 MMHG | HEART RATE: 82 BPM | DIASTOLIC BLOOD PRESSURE: 74 MMHG | DIASTOLIC BLOOD PRESSURE: 70 MMHG | SYSTOLIC BLOOD PRESSURE: 118 MMHG | HEIGHT: 64 IN | BODY MASS INDEX: 25.66 KG/M2 | RESPIRATION RATE: 12 BRPM

## 2021-06-10 ENCOUNTER — TELEPHONE (OUTPATIENT)
Dept: INTERNAL MEDICINE | Facility: CLINIC | Age: 71
End: 2021-06-10

## 2021-06-10 ENCOUNTER — CLINICAL SUPPORT (OUTPATIENT)
Dept: INTERNAL MEDICINE | Facility: CLINIC | Age: 71
End: 2021-06-10

## 2021-06-10 DIAGNOSIS — Z86.711 HISTORY OF PULMONARY EMBOLISM: Primary | ICD-10-CM

## 2021-06-10 LAB — INR PPP: 3.1 (ref 2.5–3.5)

## 2021-06-10 PROCEDURE — 85610 PROTHROMBIN TIME: CPT | Performed by: INTERNAL MEDICINE

## 2021-06-10 PROCEDURE — 36416 COLLJ CAPILLARY BLOOD SPEC: CPT | Performed by: INTERNAL MEDICINE

## 2021-06-10 NOTE — TELEPHONE ENCOUNTER
----- Message from Miriam Buenrostro MD sent at 6/10/2021  9:08 AM EDT -----      ----- Message -----  From: Odalys Huizar MA  Sent: 6/10/2021   8:55 AM EDT  To: Miriam Buenrostro MD    INR today is 3.1 she is taking 5 mg x 3 days and 7.5 mg x 4 days.

## 2021-07-13 ENCOUNTER — CLINICAL SUPPORT (OUTPATIENT)
Dept: INTERNAL MEDICINE | Facility: CLINIC | Age: 71
End: 2021-07-13

## 2021-07-13 LAB — INR PPP: 3.4 (ref 2–3.5)

## 2021-07-13 PROCEDURE — 85610 PROTHROMBIN TIME: CPT | Performed by: INTERNAL MEDICINE

## 2021-07-13 PROCEDURE — 36416 COLLJ CAPILLARY BLOOD SPEC: CPT | Performed by: INTERNAL MEDICINE

## 2021-07-13 PROCEDURE — 93793 ANTICOAG MGMT PT WARFARIN: CPT | Performed by: INTERNAL MEDICINE

## 2021-07-16 ENCOUNTER — HOSPITAL ENCOUNTER (OUTPATIENT)
Dept: MAMMOGRAPHY | Facility: HOSPITAL | Age: 71
Discharge: HOME | End: 2021-07-16
Admitting: SURGERY

## 2021-07-16 DIAGNOSIS — R92.8 ABNORMAL MAMMOGRAM: ICD-10-CM

## 2021-07-16 PROCEDURE — 77065 DX MAMMO INCL CAD UNI: CPT

## 2021-07-16 PROCEDURE — 77065 DX MAMMO INCL CAD UNI: CPT | Performed by: RADIOLOGY

## 2021-07-28 ENCOUNTER — OFFICE VISIT (OUTPATIENT)
Dept: INTERNAL MEDICINE | Facility: CLINIC | Age: 71
End: 2021-07-28

## 2021-07-28 VITALS
HEART RATE: 85 BPM | TEMPERATURE: 98 F | DIASTOLIC BLOOD PRESSURE: 78 MMHG | HEIGHT: 63 IN | BODY MASS INDEX: 32.46 KG/M2 | WEIGHT: 183.2 LBS | OXYGEN SATURATION: 96 % | SYSTOLIC BLOOD PRESSURE: 126 MMHG

## 2021-07-28 DIAGNOSIS — Z86.711 HISTORY OF PULMONARY EMBOLISM: Chronic | ICD-10-CM

## 2021-07-28 DIAGNOSIS — E11.9 TYPE 2 DIABETES, DIET CONTROLLED (HCC): Primary | Chronic | ICD-10-CM

## 2021-07-28 LAB — HBA1C MFR BLD: 7.5 %

## 2021-07-28 PROCEDURE — 99214 OFFICE O/P EST MOD 30 MIN: CPT | Performed by: INTERNAL MEDICINE

## 2021-07-28 PROCEDURE — 3051F HG A1C>EQUAL 7.0%<8.0%: CPT | Performed by: INTERNAL MEDICINE

## 2021-07-28 PROCEDURE — 83036 HEMOGLOBIN GLYCOSYLATED A1C: CPT | Performed by: INTERNAL MEDICINE

## 2021-08-12 ENCOUNTER — CLINICAL SUPPORT (OUTPATIENT)
Dept: INTERNAL MEDICINE | Facility: CLINIC | Age: 71
End: 2021-08-12

## 2021-08-12 ENCOUNTER — ANTICOAGULATION VISIT (OUTPATIENT)
Dept: INTERNAL MEDICINE | Facility: CLINIC | Age: 71
End: 2021-08-12

## 2021-08-12 ENCOUNTER — TELEPHONE (OUTPATIENT)
Dept: INTERNAL MEDICINE | Facility: CLINIC | Age: 71
End: 2021-08-12

## 2021-08-12 LAB — INR PPP: 4.8 (ref 0.9–1.1)

## 2021-08-12 PROCEDURE — 85610 PROTHROMBIN TIME: CPT | Performed by: INTERNAL MEDICINE

## 2021-08-12 PROCEDURE — 93793 ANTICOAG MGMT PT WARFARIN: CPT | Performed by: INTERNAL MEDICINE

## 2021-08-12 PROCEDURE — 36416 COLLJ CAPILLARY BLOOD SPEC: CPT | Performed by: INTERNAL MEDICINE

## 2021-08-12 NOTE — TELEPHONE ENCOUNTER
Caller: Bailey Armstrong    Relationship: Self    Best call back number:     What is the best time to reach you: ANYTIME    Who are you requesting to speak with (clinical staff, provider,  specific staff member): CLINICAL STAFF    Do you know the name of the person who called: BALIEY    What was the call regarding: SHE HAD A FINGER STICK THIS MORNING AND WANTS TO KNOW WHAT DR RAMSEY WOULD LIKE HER TO DO    Do you require a callback: YES

## 2021-08-12 NOTE — TELEPHONE ENCOUNTER
----- Message from Miriam Buenrostro MD sent at 8/12/2021  9:40 AM EDT -----  INR is high, so let us have her hold her warfarin dose tonight, and then decrease to 5 mg 4 days a week and 7.5 mg 3 days a week ,and if she could come in next week-Wednesday or Thursday-to recheck  Has she noticed any bleeding?

## 2021-08-19 ENCOUNTER — CLINICAL SUPPORT (OUTPATIENT)
Dept: INTERNAL MEDICINE | Facility: CLINIC | Age: 71
End: 2021-08-19

## 2021-08-19 LAB — INR PPP: 2.5 (ref 2–3)

## 2021-08-19 PROCEDURE — 93793 ANTICOAG MGMT PT WARFARIN: CPT | Performed by: INTERNAL MEDICINE

## 2021-08-19 PROCEDURE — 85610 PROTHROMBIN TIME: CPT | Performed by: INTERNAL MEDICINE

## 2021-08-19 PROCEDURE — 36416 COLLJ CAPILLARY BLOOD SPEC: CPT | Performed by: INTERNAL MEDICINE

## 2021-08-26 ENCOUNTER — TELEPHONE (OUTPATIENT)
Dept: INTERNAL MEDICINE | Facility: CLINIC | Age: 71
End: 2021-08-26

## 2021-08-26 ENCOUNTER — CLINICAL SUPPORT (OUTPATIENT)
Dept: INTERNAL MEDICINE | Facility: CLINIC | Age: 71
End: 2021-08-26

## 2021-08-26 LAB — INR PPP: 2.8 (ref 2.5–3)

## 2021-08-26 PROCEDURE — 36416 COLLJ CAPILLARY BLOOD SPEC: CPT | Performed by: INTERNAL MEDICINE

## 2021-08-26 PROCEDURE — 93793 ANTICOAG MGMT PT WARFARIN: CPT | Performed by: INTERNAL MEDICINE

## 2021-08-26 PROCEDURE — 85610 PROTHROMBIN TIME: CPT | Performed by: INTERNAL MEDICINE

## 2021-08-26 RX ORDER — WARFARIN SODIUM 5 MG/1
5 TABLET ORAL DAILY
Qty: 30 TABLET | Refills: 5 | Status: SHIPPED | OUTPATIENT
Start: 2021-08-26 | End: 2022-06-22 | Stop reason: SDUPTHER

## 2021-08-26 NOTE — TELEPHONE ENCOUNTER
----- Message from Miriam Buenrostro MD sent at 8/26/2021  8:59 AM EDT -----  We can leave dose same and recheck in 4 weeks

## 2021-08-27 ENCOUNTER — TELEPHONE (OUTPATIENT)
Dept: INTERNAL MEDICINE | Facility: CLINIC | Age: 71
End: 2021-08-27

## 2021-08-27 NOTE — TELEPHONE ENCOUNTER
----- Message from PASCUAL Albrecht sent at 8/27/2021  2:43 PM EDT -----  This message was sent to the wrong pool.   ----- Message -----  From: Marli Uriostegui MA  Sent: 8/26/2021   8:56 AM EDT  To: [facility]  Clinical Pool

## 2021-09-29 ENCOUNTER — TELEPHONE (OUTPATIENT)
Dept: INTERNAL MEDICINE | Facility: CLINIC | Age: 71
End: 2021-09-29

## 2021-09-29 ENCOUNTER — CLINICAL SUPPORT (OUTPATIENT)
Dept: INTERNAL MEDICINE | Facility: CLINIC | Age: 71
End: 2021-09-29

## 2021-09-29 DIAGNOSIS — Z86.711 HISTORY OF PULMONARY EMBOLISM: Primary | ICD-10-CM

## 2021-09-29 LAB — INR PPP: 3 (ref 2–3)

## 2021-09-29 PROCEDURE — 85610 PROTHROMBIN TIME: CPT | Performed by: INTERNAL MEDICINE

## 2021-09-29 PROCEDURE — 36416 COLLJ CAPILLARY BLOOD SPEC: CPT | Performed by: INTERNAL MEDICINE

## 2021-10-21 ENCOUNTER — TELEPHONE (OUTPATIENT)
Dept: INTERNAL MEDICINE | Facility: CLINIC | Age: 71
End: 2021-10-21

## 2021-10-21 ENCOUNTER — CLINICAL SUPPORT (OUTPATIENT)
Dept: INTERNAL MEDICINE | Facility: CLINIC | Age: 71
End: 2021-10-21

## 2021-10-21 DIAGNOSIS — Z86.711 HISTORY OF PULMONARY EMBOLISM: Primary | ICD-10-CM

## 2021-10-21 LAB — INR PPP: 3.7 (ref 2–3)

## 2021-10-21 PROCEDURE — 85610 PROTHROMBIN TIME: CPT | Performed by: INTERNAL MEDICINE

## 2021-10-21 PROCEDURE — 36416 COLLJ CAPILLARY BLOOD SPEC: CPT | Performed by: INTERNAL MEDICINE

## 2021-10-21 NOTE — TELEPHONE ENCOUNTER
----- Message from Miriam Buenrostro MD sent at 10/21/2021  9:58 AM EDT -----      ----- Message -----  From: Odalys Huizar MA  Sent: 10/21/2021   8:44 AM EDT  To: Mriiam Buenrostro MD    INR today is 3.7, she is taking 5mg x 4 days and 7.5 mg x 3 days.

## 2021-11-01 RX ORDER — OMEPRAZOLE 20 MG/1
20 CAPSULE, DELAYED RELEASE ORAL DAILY
Qty: 90 CAPSULE | Refills: 3 | Status: SHIPPED | OUTPATIENT
Start: 2021-11-01 | End: 2022-11-02

## 2021-11-08 ENCOUNTER — CLINICAL SUPPORT (OUTPATIENT)
Dept: INTERNAL MEDICINE | Facility: CLINIC | Age: 71
End: 2021-11-08

## 2021-11-08 ENCOUNTER — TELEPHONE (OUTPATIENT)
Dept: INTERNAL MEDICINE | Facility: CLINIC | Age: 71
End: 2021-11-08

## 2021-11-08 DIAGNOSIS — Z86.711 HISTORY OF PULMONARY EMBOLISM: Primary | ICD-10-CM

## 2021-11-08 LAB — INR PPP: 2.7 (ref 2–3)

## 2021-11-08 PROCEDURE — 85610 PROTHROMBIN TIME: CPT | Performed by: INTERNAL MEDICINE

## 2021-11-08 PROCEDURE — 36416 COLLJ CAPILLARY BLOOD SPEC: CPT | Performed by: INTERNAL MEDICINE

## 2021-11-08 NOTE — TELEPHONE ENCOUNTER
----- Message from Miriam Buenrostro MD sent at 11/8/2021  9:36 AM EST -----      ----- Message -----  From: Odalys Huizar MA  Sent: 11/8/2021   9:35 AM EST  To: Miriam Buenrostro MD    INR is 2.7 today and she is taking 5 mg x 5 days and 7.5 mg x 2 days.

## 2021-11-15 ENCOUNTER — OFFICE VISIT (OUTPATIENT)
Dept: INTERNAL MEDICINE | Facility: CLINIC | Age: 71
End: 2021-11-15

## 2021-11-15 VITALS
DIASTOLIC BLOOD PRESSURE: 80 MMHG | HEART RATE: 99 BPM | BODY MASS INDEX: 32.97 KG/M2 | TEMPERATURE: 97.3 F | OXYGEN SATURATION: 98 % | HEIGHT: 63 IN | SYSTOLIC BLOOD PRESSURE: 124 MMHG

## 2021-11-15 DIAGNOSIS — Z79.01 CHRONIC ANTICOAGULATION: Chronic | ICD-10-CM

## 2021-11-15 DIAGNOSIS — E11.9 TYPE 2 DIABETES, DIET CONTROLLED (HCC): Primary | Chronic | ICD-10-CM

## 2021-11-15 LAB
EXPIRATION DATE: ABNORMAL
HBA1C MFR BLD: 8.6 %
Lab: ABNORMAL

## 2021-11-15 PROCEDURE — 99214 OFFICE O/P EST MOD 30 MIN: CPT | Performed by: INTERNAL MEDICINE

## 2021-11-15 PROCEDURE — 83036 HEMOGLOBIN GLYCOSYLATED A1C: CPT | Performed by: INTERNAL MEDICINE

## 2021-11-15 RX ORDER — METFORMIN HYDROCHLORIDE EXTENDED-RELEASE TABLETS 500 MG/1
500 TABLET, FILM COATED, EXTENDED RELEASE ORAL
Qty: 30 TABLET | Refills: 5 | Status: SHIPPED | OUTPATIENT
Start: 2021-11-15 | End: 2022-06-22

## 2021-11-16 ENCOUNTER — TELEPHONE (OUTPATIENT)
Dept: INTERNAL MEDICINE | Facility: CLINIC | Age: 71
End: 2021-11-16

## 2021-11-16 NOTE — TELEPHONE ENCOUNTER
----- Message from Miriam Buenrostro MD sent at 11/16/2021 11:41 AM EST -----  Regarding: RE:  When I did the regular ER tablets it said it wasn’t covered, so I switched to the other, but that would be fine to switch  ----- Message -----  From: Odalys Huizar MA  Sent: 11/16/2021  11:33 AM EST  To: Miriam Buenrostro MD    Did you mean to write her metformin for the osmotic tabs.  The insurance does not cover these can I change to something else?

## 2021-11-17 NOTE — TELEPHONE ENCOUNTER
Patient states she has a really big hiatal hernia and often has loose stools anyway.  She is not sure she should be taking metformin anyway since it has GI side effects.  Patient states she is meeting with a diabetes nutrionist and is going to try a different approach to bring down her blood sugar.  She wants to know how much it would cost to do her A1C before the 3 months are up.  Like recheck in 2 months she will pay the cost.

## 2021-11-17 NOTE — TELEPHONE ENCOUNTER
I think she should do both the metformin and work w/ the dietician w/ the a1c over 8.   I am not sure cost of the a1c, but I don't think the finger stick one we do in the office would be real expensive

## 2021-11-24 ENCOUNTER — TELEPHONE (OUTPATIENT)
Dept: INTERNAL MEDICINE | Facility: CLINIC | Age: 71
End: 2021-11-24

## 2021-12-08 ENCOUNTER — TRANSCRIBE ORDERS (OUTPATIENT)
Dept: ADMINISTRATIVE | Facility: HOSPITAL | Age: 71
End: 2021-12-08

## 2021-12-08 ENCOUNTER — CLINICAL SUPPORT (OUTPATIENT)
Dept: INTERNAL MEDICINE | Facility: CLINIC | Age: 71
End: 2021-12-08

## 2021-12-08 DIAGNOSIS — Z12.31 VISIT FOR SCREENING MAMMOGRAM: Primary | ICD-10-CM

## 2021-12-08 LAB — INR PPP: 2.6 (ref 2.5–3.5)

## 2021-12-08 PROCEDURE — 93793 ANTICOAG MGMT PT WARFARIN: CPT | Performed by: INTERNAL MEDICINE

## 2021-12-08 PROCEDURE — 85610 PROTHROMBIN TIME: CPT | Performed by: INTERNAL MEDICINE

## 2021-12-08 PROCEDURE — 36416 COLLJ CAPILLARY BLOOD SPEC: CPT | Performed by: INTERNAL MEDICINE

## 2021-12-17 ENCOUNTER — HOSPITAL ENCOUNTER (OUTPATIENT)
Dept: DIABETES SERVICES | Facility: HOSPITAL | Age: 71
Setting detail: RECURRING SERIES
Discharge: HOME | End: 2021-12-17

## 2022-01-05 ENCOUNTER — CLINICAL SUPPORT (OUTPATIENT)
Dept: INTERNAL MEDICINE | Facility: CLINIC | Age: 72
End: 2022-01-05

## 2022-01-05 ENCOUNTER — TELEPHONE (OUTPATIENT)
Dept: INTERNAL MEDICINE | Facility: CLINIC | Age: 72
End: 2022-01-05

## 2022-01-05 LAB — INR PPP: 2.1 (ref 2.5–3)

## 2022-01-05 PROCEDURE — 36416 COLLJ CAPILLARY BLOOD SPEC: CPT | Performed by: INTERNAL MEDICINE

## 2022-01-05 PROCEDURE — 85610 PROTHROMBIN TIME: CPT | Performed by: INTERNAL MEDICINE

## 2022-01-05 NOTE — TELEPHONE ENCOUNTER
----- Message from Miriam Buenrostro MD sent at 1/5/2022  1:05 PM EST -----  Since INR is a little bit below goal of 2.5-3.5, lets have her go back to taking 7.5 mg 3 days a week and 5 mg the other 4 days and recheck in 2 weeks.  I usually recommend patients try to eat about the same amount of vitamin K through their diet each day.  I have a sheet that lists how much vitamin K are in a serving size of common vegetables and fruits.  Would this be helpful for her?  If so, I can mail it.  This way she can eat about the same amount of vitamin K each day

## 2022-01-13 ENCOUNTER — HOSPITAL ENCOUNTER (OUTPATIENT)
Dept: DIABETES SERVICES | Facility: HOSPITAL | Age: 72
Setting detail: RECURRING SERIES
Discharge: HOME | End: 2022-01-13

## 2022-01-13 NOTE — CONSULTS
This medical referred consult was provided as a telephone call, tele-health or e-visit, as patient is unable to attend an in-office appointment due to the COVID-19 crisis. Consent for treatment was given verbally.Patient attended the scheduled 30 minute diabetes education class follow up phone call. Please see media tab for assessment and notes if you use [employer]. If you are not an [employer] user a copy of patient's assessment and notes will be sent per routine. Thank you.

## 2022-01-21 ENCOUNTER — HOSPITAL ENCOUNTER (OUTPATIENT)
Dept: MAMMOGRAPHY | Facility: HOSPITAL | Age: 72
Discharge: HOME | End: 2022-01-21
Admitting: SURGERY

## 2022-01-21 DIAGNOSIS — Z12.31 VISIT FOR SCREENING MAMMOGRAM: ICD-10-CM

## 2022-01-21 PROCEDURE — 77063 BREAST TOMOSYNTHESIS BI: CPT | Performed by: RADIOLOGY

## 2022-01-21 PROCEDURE — 77063 BREAST TOMOSYNTHESIS BI: CPT

## 2022-01-21 PROCEDURE — 77067 SCR MAMMO BI INCL CAD: CPT | Performed by: RADIOLOGY

## 2022-01-21 PROCEDURE — 77067 SCR MAMMO BI INCL CAD: CPT

## 2022-02-09 ENCOUNTER — CLINICAL SUPPORT (OUTPATIENT)
Dept: INTERNAL MEDICINE | Facility: CLINIC | Age: 72
End: 2022-02-09

## 2022-02-09 ENCOUNTER — TELEPHONE (OUTPATIENT)
Dept: INTERNAL MEDICINE | Facility: CLINIC | Age: 72
End: 2022-02-09

## 2022-02-09 DIAGNOSIS — Z86.711 HISTORY OF PULMONARY EMBOLISM: Primary | ICD-10-CM

## 2022-02-09 LAB — INR PPP: 2.4

## 2022-02-09 PROCEDURE — 85610 PROTHROMBIN TIME: CPT | Performed by: INTERNAL MEDICINE

## 2022-02-09 PROCEDURE — 36416 COLLJ CAPILLARY BLOOD SPEC: CPT | Performed by: INTERNAL MEDICINE

## 2022-02-09 NOTE — TELEPHONE ENCOUNTER
----- Message from Miriam Buenrostro MD sent at 2/9/2022 11:36 AM EST -----  We can leave dose same. It is just a little below where we want (goal 2.5-3.5), so let's recheck in 2 weeks

## 2022-02-23 ENCOUNTER — TELEPHONE (OUTPATIENT)
Dept: INTERNAL MEDICINE | Facility: CLINIC | Age: 72
End: 2022-02-23

## 2022-02-23 ENCOUNTER — CLINICAL SUPPORT (OUTPATIENT)
Dept: INTERNAL MEDICINE | Facility: CLINIC | Age: 72
End: 2022-02-23

## 2022-02-23 LAB — INR PPP: 3.3 (ref 2–3)

## 2022-02-23 PROCEDURE — 85610 PROTHROMBIN TIME: CPT | Performed by: INTERNAL MEDICINE

## 2022-02-23 PROCEDURE — 36416 COLLJ CAPILLARY BLOOD SPEC: CPT | Performed by: INTERNAL MEDICINE

## 2022-02-23 PROCEDURE — 93793 ANTICOAG MGMT PT WARFARIN: CPT | Performed by: INTERNAL MEDICINE

## 2022-02-23 NOTE — TELEPHONE ENCOUNTER
PN and verbalized understanding.  She is to take the same dose of warfarin and recheck in 4 weeks.

## 2022-02-23 NOTE — TELEPHONE ENCOUNTER
----- Message from Miriam Buenrostro MD sent at 2/23/2022  9:53 AM EST -----  INR 3.3-leave dose the same and recheck in 4 weeks

## 2022-03-10 ENCOUNTER — LAB (OUTPATIENT)
Dept: LAB | Facility: HOSPITAL | Age: 72
End: 2022-03-10

## 2022-03-10 ENCOUNTER — OFFICE VISIT (OUTPATIENT)
Dept: INTERNAL MEDICINE | Facility: CLINIC | Age: 72
End: 2022-03-10

## 2022-03-10 VITALS
HEART RATE: 87 BPM | DIASTOLIC BLOOD PRESSURE: 88 MMHG | OXYGEN SATURATION: 100 % | TEMPERATURE: 97.7 F | WEIGHT: 187.6 LBS | BODY MASS INDEX: 33.24 KG/M2 | SYSTOLIC BLOOD PRESSURE: 132 MMHG | HEIGHT: 63 IN

## 2022-03-10 DIAGNOSIS — Z86.711 HISTORY OF PULMONARY EMBOLISM: Chronic | ICD-10-CM

## 2022-03-10 DIAGNOSIS — Z79.899 HIGH RISK MEDICATIONS (NOT ANTICOAGULANTS) LONG-TERM USE: ICD-10-CM

## 2022-03-10 DIAGNOSIS — E11.65 UNCONTROLLED TYPE 2 DIABETES MELLITUS WITH HYPERGLYCEMIA: ICD-10-CM

## 2022-03-10 DIAGNOSIS — E11.65 UNCONTROLLED TYPE 2 DIABETES MELLITUS WITH HYPERGLYCEMIA: Primary | ICD-10-CM

## 2022-03-10 LAB
ALBUMIN SERPL-MCNC: 4.3 G/DL (ref 3.5–5.2)
ALBUMIN UR-MCNC: 1.5 MG/DL
ALBUMIN/GLOB SERPL: 1.3 G/DL
ALP SERPL-CCNC: 115 U/L (ref 39–117)
ALT SERPL W P-5'-P-CCNC: 27 U/L (ref 1–33)
ANION GAP SERPL CALCULATED.3IONS-SCNC: 12.1 MMOL/L (ref 5–15)
AST SERPL-CCNC: 28 U/L (ref 1–32)
BILIRUB SERPL-MCNC: 1 MG/DL (ref 0–1.2)
BUN SERPL-MCNC: 9 MG/DL (ref 8–23)
BUN/CREAT SERPL: 11.3 (ref 7–25)
CALCIUM SPEC-SCNC: 9 MG/DL (ref 8.6–10.5)
CHLORIDE SERPL-SCNC: 100 MMOL/L (ref 98–107)
CO2 SERPL-SCNC: 25.9 MMOL/L (ref 22–29)
CREAT SERPL-MCNC: 0.8 MG/DL (ref 0.57–1)
CREAT UR-MCNC: 154.7 MG/DL
DEPRECATED RDW RBC AUTO: 41.8 FL (ref 37–54)
EGFRCR SERPLBLD CKD-EPI 2021: 77.9 ML/MIN/1.73
ERYTHROCYTE [DISTWIDTH] IN BLOOD BY AUTOMATED COUNT: 12.5 % (ref 12.3–15.4)
EXPIRATION DATE: ABNORMAL
GLOBULIN UR ELPH-MCNC: 3.4 GM/DL
GLUCOSE SERPL-MCNC: 155 MG/DL (ref 65–99)
HBA1C MFR BLD: 8.7 %
HCT VFR BLD AUTO: 40 % (ref 34–46.6)
HGB BLD-MCNC: 13.2 G/DL (ref 12–15.9)
Lab: ABNORMAL
MCH RBC QN AUTO: 29.8 PG (ref 26.6–33)
MCHC RBC AUTO-ENTMCNC: 33 G/DL (ref 31.5–35.7)
MCV RBC AUTO: 90.3 FL (ref 79–97)
MICROALBUMIN/CREAT UR: 9.7 MG/G
PLATELET # BLD AUTO: 265 10*3/MM3 (ref 140–450)
PMV BLD AUTO: 10.8 FL (ref 6–12)
POTASSIUM SERPL-SCNC: 3.7 MMOL/L (ref 3.5–5.2)
PROT SERPL-MCNC: 7.7 G/DL (ref 6–8.5)
RBC # BLD AUTO: 4.43 10*6/MM3 (ref 3.77–5.28)
SODIUM SERPL-SCNC: 138 MMOL/L (ref 136–145)
TSH SERPL DL<=0.05 MIU/L-ACNC: 1.9 UIU/ML (ref 0.27–4.2)
WBC NRBC COR # BLD: 6.22 10*3/MM3 (ref 3.4–10.8)

## 2022-03-10 PROCEDURE — 82570 ASSAY OF URINE CREATININE: CPT | Performed by: INTERNAL MEDICINE

## 2022-03-10 PROCEDURE — 82043 UR ALBUMIN QUANTITATIVE: CPT | Performed by: INTERNAL MEDICINE

## 2022-03-10 PROCEDURE — 80050 GENERAL HEALTH PANEL: CPT | Performed by: INTERNAL MEDICINE

## 2022-03-10 PROCEDURE — 99214 OFFICE O/P EST MOD 30 MIN: CPT | Performed by: INTERNAL MEDICINE

## 2022-03-10 PROCEDURE — 83036 HEMOGLOBIN GLYCOSYLATED A1C: CPT | Performed by: INTERNAL MEDICINE

## 2022-03-10 RX ORDER — BLOOD-GLUCOSE METER
1 KIT MISCELLANEOUS AS NEEDED
Qty: 1 EACH | Refills: 0 | Status: SHIPPED | OUTPATIENT
Start: 2022-03-10

## 2022-03-10 NOTE — PROGRESS NOTES
Chief Complaint  Diabetes (F/u w/A1C today)    Subjective          Bailey Armstrong presents to [facility]  History of Present Illness    DMT2- last a1c was 8.6 done 4 months ago. We started metformin but she called back and said she was concerned about the GI side effects so did not start. She has been working w/ dietician and eating multiple meals and snacks a day and weight is up. She is not sure how many carbs she is getting.   Breakfast- blueberries, 1/2 c cereal, milk  Lunch- banana, tunafish, bread  She is eating about 2000 esperanza/day  She is not exercising but may join gym soon. She has had a lot of stress w/ some tragedies in her friends' lives and her son has had some health problems    History of pulmonary embolism-her last INR was 3.3 done about 2 weeks ago and we left warfarin dose the same      Current Outpatient Medications:   •  clonazePAM (KlonoPIN) 0.5 MG tablet, Take 0.5 mg by mouth Every Night., Disp: , Rfl:   •  IRON PO, Take  by mouth Daily., Disp: , Rfl:   •  OLANZapine (zyPREXA) 2.5 MG tablet, Take 0.625 mg by mouth Every Night., Disp: , Rfl:   •  omeprazole (priLOSEC) 20 MG capsule, TAKE 1 CAPSULE BY MOUTH DAILY, Disp: 90 capsule, Rfl: 3  •  PARoxetine (PAXIL) 20 MG tablet, Take 20 mg by mouth Daily., Disp: , Rfl:   •  triamcinolone (KENALOG) 0.1 % ointment, 1 application Daily As Needed., Disp: , Rfl:   •  warfarin (COUMADIN) 5 MG tablet, TAKE 1 TABLET BY MOUTH DAILY, Disp: 30 tablet, Rfl: 5  •  warfarin (COUMADIN) 7.5 MG tablet, 1 qd as directed, Disp: 30 tablet, Rfl: 5  •  glucose blood test strip, Use once daily, Disp: 100 each, Rfl: 3  •  glucose monitor monitoring kit, 1 each As Needed (once daily)., Disp: 1 each, Rfl: 0  •  hydroquinone 4 % cream, 1 application Daily As Needed., Disp: , Rfl:   •  metFORMIN (FORTAMET) 500 MG (OSM) 24 hr tablet, Take 1 tablet by mouth Daily With Breakfast., Disp: 30 tablet, Rfl: 5      Objective   Vital Signs:   /88 (BP  "Location: Left arm, Patient Position: Sitting)   Pulse 87   Temp 97.7 °F (36.5 °C) (Infrared)   Ht 158.8 cm (62.5\")   Wt 85.1 kg (187 lb 9.6 oz)   SpO2 100%   BMI 33.77 kg/m²       Physical exam  Constitutional: oriented to person, place, and time.  well-developed and well-nourished. No distress.   HENT:   Head: Normocephalic and atraumatic.   Eyes: Conjunctivae and EOM are normal.   Cardiovascular: Normal rate, regular rhythm and normal heart sounds.  Exam reveals no gallop and no friction rub.    No murmur heard.  Pulmonary/Chest: Effort normal and breath sounds normal. No respiratory distress.   no wheezes.   Neurological:  alert and oriented to person, place, and time.   Skin: Skin is warm and dry. not diaphoretic.   Psychiatric:  normal mood and affect. behavior is normal. Judgment and thought content normal.      Result Review :   The following data was reviewed by: Miriam Buenrostro MD on 03/10/2022:              A1C Last 3 Results    HGBA1C Last 3 Results 7/28/21 11/15/21 3/10/22   Hemoglobin A1C 7.5 8.6 8.7           INR    Common Labsle 11/8/21 12/8/21 1/5/22 1/19/22 2/9/22 2/23/22   INR 2.70 2.60 (A) 2.10 (A) 2.3 (A) 2.40 3.30 (A)   (A) Abnormal value       Comments are available for some flowsheets but are not being displayed.                       Assessment and Plan    Diagnoses and all orders for this visit:    1. Uncontrolled type 2 diabetes mellitus with hyperglycemia (HCC) (Primary)-unfortunately, A1c no better and she continues to decline medication.  Discussed with patient we can try something other than Metformin as she is worried about GI side effects with Metformin but she still declines.  Diet discussed in detail with patient.  We will have her start counting carbohydrate servings and do 2 carbs per meal.  Information given to patient.  She would also like to start checking her glucose so I will send in the meter.  Recommended that she check her sugar once a day at 2 hours after meals or " first thing in the morning.  We will have her follow-up in 3 months.  She does have an eye exam scheduled  -     CBC (No Diff); Future  -     Comprehensive Metabolic Panel; Future  -     TSH; Future  -     Microalbumin / Creatinine Urine Ratio - Urine, Clean Catch; Future  -     POC Glycosylated Hemoglobin (Hb A1C)  -     glucose monitor monitoring kit; 1 each As Needed (once daily).  Dispense: 1 each; Refill: 0  -     glucose blood test strip; Use once daily  Dispense: 100 each; Refill: 3    2. History of pulmonary embolism-on Coumadin.  We will check labs today.  INR is due in about 2 weeks    3. High risk medications (not anticoagulants) long-term use  -     CBC (No Diff); Future  -     Comprehensive Metabolic Panel; Future        Follow Up   Return in about 3 months (around 6/10/2022) for Next scheduled follow up.  Patient was given instructions and counseling regarding her condition or for health maintenance advice. Please see specific information pulled into the AVS if appropriate.

## 2022-03-29 NOTE — PROGRESS NOTES
Occupational Therapy  Visit Type: initial evaluation  Precautions:  Medical precautions:  fall risk and radiation; standard precautions.            Patient admitted with c/o right hand tingling and dysarthria. NIHSS=1. CTA unremarkable. PT/OT ordered. Activity as tolerated. Patient seen on 2 cardiac unit.    PMHx: HTN, hyperlipidemia, TIA, BPH    PPE: Therapist in close proximity to patient (<6 feet). Patient without mask donned throughout session, with exception of when completing endurance task outside room.         Lines:     Basic: capped IV and telemetry      Lines in chart and on patient reviewed, precautions maintained throughout session.  Hearing: no hearing deficits  Vision:     Current vision: no visual deficits  Safety Measures: bed alarm, chair alarm and bed rails    SUBJECTIVE  Patient agreed to participate in therapy this date.        \"Everything is back to normal now.\"    Patient has not been hospitalized, in a skilled nursing facility, or seen by home health in the last 30 days.  Patient / Family Goal: return to previous functional status, maximize function and return home    Pain   RN informed on pain level    OBJECTIVE   Level of consciousness: alert    Oriented to person, place, time and situation     Arousal alertness: appropriate responses to stimuli    Affect/Behavior: alert  Functional Communication/Cognition       Form of communication:  Verbal   Attention span:  Appears intact    Commands: follows all commands and directions consistently.    Transition between tasks: transitions tasks without difficulty.    Safety judgement: good awareness of safety precautions.    Awareness of deficits: fully aware of deficits.  Upper Extremity Function: Left: functional  Right: functional  Range of Motion (measured in degrees unless otherwise indicated)  WNL: LUE, RUE  Strength (out of 5 unless otherwise indicated)  WFL: LUE, RUE  Balance (trials in sec unless otherwise indicated)  Sitting:   - Static:   It is just a little high, so let's have her do 5mg 5 days a week, and 7.5mg 2 days a week, and recheck in 2 weeks   independent    - Dynamic:  independent  Standing - Firm Surface - Eyes Open:    - Static: modified independent double upper extremity support   - Dynamic:  modified independent double upper extremity support    Bed mobility:      Supine to sit: independent    Sit to supine: independent  Training completed:    Tasks: all aspects of bed mobility  Transfers:    Assistive devices: gait belt    Sit to stand: independent    Stand to sit: independent   Bed to chair: independent, type:    Training completed:    Tasks: sit to stand and stand to sit  Functional Ambulation:    Assistance:independent    Activities of Daily Living (ADLs):  Eating:     Assist: independent  Grooming/Oral Hygiene:     Grooming assist: independent  Upper Body Dressing:    Assist: independent  Lower Body Dressing:     Assist: independent  Toilet transfer:     Assist: independent    Device: gait belt  Shower Transfer:     Assist: independent      Interventions    Training provided: ADL training, activity tolerance, balance retraining, bed mobility training, community reintegration, compensatory techniques, functional ambulation, HEP training, positioning, neuromuscular reeducation, safety training and transfer training  Skilled input: verbal instruction/cues, tactile instruction/cues and posture correction  Verbal Consent: Writer verbally educated and received verbal consent for hand placement, positioning of patient, and techniques to be performed today from patient for clothing adjustments for techniques, therapist position for techniques and hand placement and palpation for techniques as described above and how they are pertinent to the patient's plan of care.        ASSESSMENT        Patient tolerated OT evaluation well. Pleasant and motivated. A&Ox3. Supine to EOB independent. Patient donned socks independently. Sit to stand independent. Patient completed simulated toilet transfer independently. Endurance task completed independently without loss  of balance noted. Chair transfer completed independently. Hand off to RN with alarm in tact.    Discussed discharge options with patient reporting they were independent without assistive device. Lives with family in a house. Owns appropriate DME. Patient is at their baseline, thus will DC IP OT.    Thank you for the referral.    -Humberto Butts MS, OTR/L             Skilled therapy is not required due to patient is independent with functional mobility and ADL tasks.  Clinical decision making: Low - Patient has few limitations (1-3), comorbidities and/or complexities, as noted in problem focused assessment noted above, that impact their occupational profile.  Resulting in few treatment options and no task modification consistent with low clinical decision making complexity.    End of Session:   Location: in chair  Safety measures: alarm system in place/re-engaged  Handoff to: nurse  Education Provided:   Learning assessment:  - Primary learner: patient  - Barriers to learning: no barriers apparent at this time    PLAN    Agreement to plan and goals: patient agrees with goals and treatment plan      Documented in the chart in the following areas: Prior Level of Function. Pain. Assessment. Plan. Patient Education.      Admitting diagnosis: TIA (transient ischemic attack) (G45.9)    Co-morbidities and problem list:   Patient Active Problem List:   BPH without obstruction/lower urinary tract symptoms   Hyperlipidemia   Male erectile disorder   Patent foramen ovale   History of TIA (transient ischemic attack)   Healthcare maintenance   Osteoarthritis of left shoulder   TIA (transient ischemic attack)        Therapy procedure time and total treatment time can be found documented on the Time Entry flowsheet

## 2022-03-30 ENCOUNTER — CLINICAL SUPPORT (OUTPATIENT)
Dept: INTERNAL MEDICINE | Facility: CLINIC | Age: 72
End: 2022-03-30

## 2022-03-30 ENCOUNTER — LAB (OUTPATIENT)
Dept: LAB | Facility: HOSPITAL | Age: 72
End: 2022-03-30

## 2022-03-30 ENCOUNTER — OFFICE VISIT (OUTPATIENT)
Dept: INTERNAL MEDICINE | Facility: CLINIC | Age: 72
End: 2022-03-30

## 2022-03-30 ENCOUNTER — TELEPHONE (OUTPATIENT)
Dept: INTERNAL MEDICINE | Facility: CLINIC | Age: 72
End: 2022-03-30

## 2022-03-30 VITALS
OXYGEN SATURATION: 99 % | RESPIRATION RATE: 18 BRPM | DIASTOLIC BLOOD PRESSURE: 74 MMHG | HEART RATE: 107 BPM | SYSTOLIC BLOOD PRESSURE: 124 MMHG

## 2022-03-30 DIAGNOSIS — Z01.812 PRE-OPERATIVE LABORATORY EXAMINATION: Primary | ICD-10-CM

## 2022-03-30 DIAGNOSIS — H81.12 BENIGN PAROXYSMAL POSITIONAL VERTIGO OF LEFT EAR: Primary | ICD-10-CM

## 2022-03-30 LAB — INR PPP: 3 (ref 2–3)

## 2022-03-30 PROCEDURE — 85610 PROTHROMBIN TIME: CPT | Performed by: INTERNAL MEDICINE

## 2022-03-30 PROCEDURE — 36416 COLLJ CAPILLARY BLOOD SPEC: CPT | Performed by: INTERNAL MEDICINE

## 2022-03-30 PROCEDURE — U0004 COV-19 TEST NON-CDC HGH THRU: HCPCS | Performed by: FAMILY MEDICINE

## 2022-03-30 PROCEDURE — 99213 OFFICE O/P EST LOW 20 MIN: CPT | Performed by: STUDENT IN AN ORGANIZED HEALTH CARE EDUCATION/TRAINING PROGRAM

## 2022-03-30 NOTE — PROGRESS NOTES
Chief Complaint  Dizziness (Possible ear infection, has been developing symptoms since yesterday)    Bailey Armstrong presents to [facility]      Subjective   Patient is a 71-year-old female who presents to clinic for evaluation of dizziness.  Her symptoms began acutely yesterday.  She states that the dizziness is worse with certain head movements.  The dizziness is not associated with nausea.  She otherwise is feeling well, no fevers no chills.  She denies any ear pain or muffled hearing.    The following portions of the patient's history were reviewed and updated as appropriate: allergies, current medications, past family history, past medical history, past social history, past surgical history and problem list.      Health Maintenance   Topic Date Due   • ZOSTER VACCINE (1 of 2) Never done   • LIPID PANEL  02/24/2022   • ANNUAL PHYSICAL  02/25/2022   • COVID-19 Vaccine (4 - Booster for Moderna series) 04/03/2022   • DIABETIC EYE EXAM  04/28/2022 (Originally 8/1/2021)   • DIABETIC FOOT EXAM  07/28/2022   • HEMOGLOBIN A1C  09/10/2022   • MAMMOGRAM  01/21/2023   • URINE MICROALBUMIN  03/10/2023   • DXA SCAN  05/11/2023   • COLORECTAL CANCER SCREENING  01/01/2024   • HEPATITIS C SCREENING  Completed   • INFLUENZA VACCINE  Completed   • Pneumococcal Vaccine 65+  Completed         Procedures       Past Medical History:   Diagnosis Date   • Anemia     received fe injections in [facility]. 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)    • Spinal headache       Allergies   Allergen Reactions   • Tetanus Toxoids Anaphylaxis   • Anafranil [Clomipramine Hcl] Other (See Comments)     Severe muscle spasms    • Prednisone Paresthesia     \"mouth and bottom numbness\"   • Adhesive Tape Rash     blister   • Latex Rash      Social History     Tobacco Use   • Smoking status: Never Smoker   • Smokeless tobacco: Never Used   Substance Use Topics   • Alcohol use: Yes     Comment: occasional may 1 glass of wine monthly   • Drug use: No     Past Surgical History:   Procedure Laterality Date   • BREAST BIOPSY Bilateral 2019    Procedure: WIRE LOCALIZED EXCISIONAL BIOPSY OF ATYPICAL DUCTAL HYPERPLASIA, AND WIRE LOCALIZED EXCISIONAL LEFT BREAST PAPILLOMA;  Surgeon: Sita Ramon MD;  Location:  [facility];  Service: General   • BREAST CYST ASPIRATION Bilateral    • BREAST EXCISIONAL BIOPSY Left 2019    LEFT PAPILLOMA AND RADIAL SCAR   • BREAST LUMPECTOMY Right 2019    Procedure: RE-EXCISION OF RIGHT LUMPECTOMY SITE;  Surgeon: Sita Ramon MD;  Location:  [facility];  Service: General   • BRONCHOSCOPY  2017    w/ Dr Bharathi Schultz - chronic inflammation- prednisone started   • CARDIAC CATHETERIZATION      no stents    •  SECTION     • COLONOSCOPY      instructed to have another one completed in 10 years    • ENDOSCOPY     • INFERIOR VENA CAVA ANGIOPLASTY / STENTING      IVC filter placed   • OTHER SURGICAL HISTORY  10/01/2017    Pulmonary thrombo Endacartarectomy - done in " [place]      Family History   Problem Relation Age of Onset   • Breast cancer Maternal Aunt 40   • Ovarian cancer Maternal Aunt    • Hypertension Mother    • Heart failure Mother    • Diabetes Mother    • Obesity Mother    • Heart disease Father    • Rheumatic fever Father    • Breast cancer Cousin    • Arthritis Sister    • Diabetes Sister    • Hypertension Sister    • Stroke Sister    • Endometrial cancer Neg Hx          Current Outpatient Medications:   •  clonazePAM (KlonoPIN) 0.5 MG tablet, Take 0.5 mg by mouth Every Night., Disp: , Rfl:   •  glucose blood test strip, Use once daily, Disp: 100 each, Rfl: 3  •  glucose monitor monitoring kit, 1 each As Needed (once daily)., Disp: 1 each, Rfl: 0  •  hydroquinone 4 % cream, 1 application Daily As Needed., Disp: , Rfl:   •  IRON PO, Take  by mouth Daily., Disp: , Rfl:   •  metFORMIN (FORTAMET) 500 MG (OSM) 24 hr tablet, Take 1 tablet by mouth Daily With Breakfast., Disp: 30 tablet, Rfl: 5  •  OLANZapine (zyPREXA) 2.5 MG tablet, Take 0.625 mg by mouth Every Night., Disp: , Rfl:   •  omeprazole (priLOSEC) 20 MG capsule, TAKE 1 CAPSULE BY MOUTH DAILY, Disp: 90 capsule, Rfl: 3  •  PARoxetine (PAXIL) 20 MG tablet, Take 20 mg by mouth Daily., Disp: , Rfl:   •  triamcinolone (KENALOG) 0.1 % ointment, 1 application Daily As Needed., Disp: , Rfl:   •  warfarin (COUMADIN) 5 MG tablet, TAKE 1 TABLET BY MOUTH DAILY, Disp: 30 tablet, Rfl: 5  •  warfarin (COUMADIN) 7.5 MG tablet, 1 qd as directed, Disp: 30 tablet, Rfl: 5    Objective   Vital Signs  /74   Pulse 107   Resp 18   SpO2 99%   There is no height or weight on file to calculate BMI.     Physical Exam  Vitals and nursing note reviewed.   Constitutional:       General: She is not in acute distress.     Appearance: Normal appearance. She is not toxic-appearing.   HENT:      Right Ear: Hearing, tympanic membrane, ear canal and external ear normal.      Left Ear: Hearing, tympanic membrane, ear canal and external  ear normal.   Eyes:      Extraocular Movements:      Left eye: Nystagmus present.   Neurological:      General: No focal deficit present.      Mental Status: She is alert and oriented to person, place, and time. Mental status is at baseline.   Psychiatric:         Mood and Affect: Mood normal.         Behavior: Behavior normal.          Assessment and Plan  Diagnoses and all orders for this visit:    1. Benign paroxysmal positional vertigo of left ear (Primary)  -     Ambulatory Referral to Physical Therapy Vestibular    Her symptoms of dizziness seems to be consistent with BPPV.  I sent her a referral to see a physical therapist for vestibular therapy.  We discussed the possibility of starting medication such as Medrol Dosepak or meclizine however patient would like to hold off on this for right now.  No red flag signs or symptoms at this time.  She will follow up if symptoms do not improve or worsen.        Follow Up    Return if symptoms worsen or fail to improve.    Patient was given instructions and counseling regarding her condition or for health maintenance advice. Please see specific information pulled into the AVS if appropriate.    Electronically signed by:   Ricky Saavedra MD  03/30/2022

## 2022-03-30 NOTE — TELEPHONE ENCOUNTER
----- Message from Miriam Buenrostro MD sent at 3/30/2022 12:44 PM EDT -----  We can leave dose same and recheck in 4 weeks

## 2022-03-31 ENCOUNTER — TELEPHONE (OUTPATIENT)
Dept: INTERNAL MEDICINE | Facility: CLINIC | Age: 72
End: 2022-03-31

## 2022-03-31 LAB — SARS-COV-2 RNA NOSE QL NAA+PROBE: NOT DETECTED

## 2022-03-31 NOTE — TELEPHONE ENCOUNTER
----- Message from Marli Uriostegui MA sent at 3/30/2022 12:31 PM EDT -----  I started an order but not sure if correct. She is having daughter visit and needs a test. Can you place an order or sign this one please?  
Covid test was negative.  
Declined

## 2022-04-01 ENCOUNTER — TELEPHONE (OUTPATIENT)
Dept: INTERNAL MEDICINE | Facility: CLINIC | Age: 72
End: 2022-04-01

## 2022-04-01 NOTE — TELEPHONE ENCOUNTER
----- Message from Rosibel HOGAN MD sent at 3/31/2022  6:53 PM EDT -----  Preprocedure Covid screening was negative.

## 2022-04-08 RX ORDER — WARFARIN SODIUM 7.5 MG/1
TABLET ORAL
Qty: 30 TABLET | Refills: 5 | Status: SHIPPED | OUTPATIENT
Start: 2022-04-08 | End: 2023-01-23

## 2022-04-28 ENCOUNTER — TELEPHONE (OUTPATIENT)
Dept: INTERNAL MEDICINE | Facility: CLINIC | Age: 72
End: 2022-04-28

## 2022-04-28 ENCOUNTER — ANTICOAGULATION VISIT (OUTPATIENT)
Dept: INTERNAL MEDICINE | Facility: CLINIC | Age: 72
End: 2022-04-28

## 2022-04-28 DIAGNOSIS — Z79.01 CHRONIC ANTICOAGULATION: Primary | ICD-10-CM

## 2022-04-28 LAB — INR PPP: 4.4 (ref 2–3)

## 2022-04-28 PROCEDURE — 93793 ANTICOAG MGMT PT WARFARIN: CPT | Performed by: INTERNAL MEDICINE

## 2022-04-28 PROCEDURE — 85610 PROTHROMBIN TIME: CPT | Performed by: INTERNAL MEDICINE

## 2022-04-28 PROCEDURE — 36416 COLLJ CAPILLARY BLOOD SPEC: CPT | Performed by: INTERNAL MEDICINE

## 2022-04-28 NOTE — TELEPHONE ENCOUNTER
----- Message from Miriam Buenrostro MD sent at 4/28/2022 10:44 AM EDT -----  Let's have her go down to 7.5mg 4 days a week, and 5mg 3 days a week, and recheck in 2 weeks

## 2022-05-12 ENCOUNTER — TELEPHONE (OUTPATIENT)
Dept: INTERNAL MEDICINE | Facility: CLINIC | Age: 72
End: 2022-05-12

## 2022-05-12 NOTE — TELEPHONE ENCOUNTER
----- Message from Miriam Buenrostro MD sent at 5/12/2022 11:05 AM EDT -----  We can leave dose same and recheck in 4 weeks

## 2022-06-03 ENCOUNTER — CLINICAL SUPPORT (OUTPATIENT)
Dept: INTERNAL MEDICINE | Facility: CLINIC | Age: 72
End: 2022-06-03

## 2022-06-03 ENCOUNTER — TELEPHONE (OUTPATIENT)
Dept: INTERNAL MEDICINE | Facility: CLINIC | Age: 72
End: 2022-06-03

## 2022-06-03 LAB — INR PPP: 3.8

## 2022-06-03 PROCEDURE — 85610 PROTHROMBIN TIME: CPT | Performed by: INTERNAL MEDICINE

## 2022-06-03 PROCEDURE — 36416 COLLJ CAPILLARY BLOOD SPEC: CPT | Performed by: INTERNAL MEDICINE

## 2022-06-03 NOTE — TELEPHONE ENCOUNTER
----- Message from Miriam Buenrostro MD sent at 6/3/2022 10:18 AM EDT -----  Let's have her do the 7.5mg  3 days a week and the 5mg 4 days a week, as it is just a little higher than we want, and recheck in 2 weeks

## 2022-06-22 ENCOUNTER — OFFICE VISIT (OUTPATIENT)
Dept: INTERNAL MEDICINE | Facility: CLINIC | Age: 72
End: 2022-06-22

## 2022-06-22 VITALS
BODY MASS INDEX: 32.46 KG/M2 | WEIGHT: 183.2 LBS | OXYGEN SATURATION: 99 % | HEIGHT: 63 IN | TEMPERATURE: 97.3 F | SYSTOLIC BLOOD PRESSURE: 132 MMHG | HEART RATE: 97 BPM | DIASTOLIC BLOOD PRESSURE: 88 MMHG

## 2022-06-22 DIAGNOSIS — Z86.711 HISTORY OF PULMONARY EMBOLISM: Chronic | ICD-10-CM

## 2022-06-22 DIAGNOSIS — E11.65 UNCONTROLLED TYPE 2 DIABETES MELLITUS WITH HYPERGLYCEMIA: Primary | Chronic | ICD-10-CM

## 2022-06-22 LAB
EXPIRATION DATE: ABNORMAL
HBA1C MFR BLD: 9.4 %
INR PPP: 4.4 (ref 2–3)
Lab: ABNORMAL

## 2022-06-22 PROCEDURE — 36416 COLLJ CAPILLARY BLOOD SPEC: CPT | Performed by: INTERNAL MEDICINE

## 2022-06-22 PROCEDURE — 83036 HEMOGLOBIN GLYCOSYLATED A1C: CPT | Performed by: INTERNAL MEDICINE

## 2022-06-22 PROCEDURE — 99214 OFFICE O/P EST MOD 30 MIN: CPT | Performed by: INTERNAL MEDICINE

## 2022-06-22 PROCEDURE — 85610 PROTHROMBIN TIME: CPT | Performed by: INTERNAL MEDICINE

## 2022-06-22 RX ORDER — METFORMIN HYDROCHLORIDE 500 MG/1
TABLET, EXTENDED RELEASE ORAL
Qty: 60 TABLET | Refills: 5 | Status: SHIPPED | OUTPATIENT
Start: 2022-06-22 | End: 2022-09-22

## 2022-06-22 RX ORDER — WARFARIN SODIUM 5 MG/1
5 TABLET ORAL DAILY
Qty: 30 TABLET | Refills: 5 | Status: SHIPPED | OUTPATIENT
Start: 2022-06-22

## 2022-06-22 NOTE — PROGRESS NOTES
Chief Complaint  Diabetes (A1C today) and history of pulmonary embolism (Will check INR today.)    Subjective          Bailey Armstrong presents to [facility]  History of Present Illness    DMT2- last a1c was 8.7 done 3 months ago. We had prescribed  metformin but she called back and said she was concerned about the GI side effects so did not start. She feels like her diet has not been good. She meets w/ dietician at the   in July. She has been going to gym 2-3x/week regularly since last visit.  The dietitian does check her glucose when she goes to the  and it generally is in the high 100 range.     History of pulmonary embolism-her last INR was 3.8 done about 2 weeks ago and we lowered to 7.5 mg 3 days a week and 5 mg 4 days a week.  She has not noticed any unusual bruising or bleeding.  No new medications    Vertigo - she saw Dr North and did have Epley maneuver done and she is feeling better.    Current Outpatient Medications:   •  clonazePAM (KlonoPIN) 0.5 MG tablet, Take 0.5 mg by mouth Every Night., Disp: , Rfl:   •  glucose blood test strip, Use once daily, Disp: 100 each, Rfl: 3  •  glucose monitor monitoring kit, 1 each As Needed (once daily)., Disp: 1 each, Rfl: 0  •  hydroquinone 4 % cream, 1 application Daily As Needed., Disp: , Rfl:   •  IRON PO, Take  by mouth Daily., Disp: , Rfl:   •  OLANZapine (zyPREXA) 2.5 MG tablet, Take 0.625 mg by mouth Every Night., Disp: , Rfl:   •  omeprazole (priLOSEC) 20 MG capsule, TAKE 1 CAPSULE BY MOUTH DAILY, Disp: 90 capsule, Rfl: 3  •  PARoxetine (PAXIL) 20 MG tablet, Take 20 mg by mouth Daily., Disp: , Rfl:   •  triamcinolone (KENALOG) 0.1 % ointment, 1 application Daily As Needed., Disp: , Rfl:   •  warfarin (COUMADIN) 5 MG tablet, Take 1 tablet by mouth Daily., Disp: 30 tablet, Rfl: 5  •  warfarin (COUMADIN) 7.5 MG tablet, TAKE 1 TABLET BY MOUTH EVERY DAY AS DIRECTED, Disp: 30 tablet, Rfl: 5  •  metFORMIN ER (GLUCOPHAGE-XR) 500 MG 24  "hr tablet, 1 tablet daily with food x 1 week, then raise 2 tablets daily w/ food, Disp: 60 tablet, Rfl: 5      Objective   Vital Signs:   /88 (BP Location: Right arm, Patient Position: Sitting)   Pulse 97   Temp 97.3 °F (36.3 °C) (Infrared)   Ht 158.8 cm (62.5\")   Wt 83.1 kg (183 lb 3.2 oz)   SpO2 99%   BMI 32.97 kg/m²       Physical exam  Constitutional: oriented to person, place, and time.  well-developed and well-nourished. No distress.   HENT:   Head: Normocephalic and atraumatic.   Eyes: Conjunctivae and EOM are normal.   Cardiovascular: Normal rate, regular rhythm and normal heart sounds.  Exam reveals no gallop and no friction rub.    No murmur heard.  Pulmonary/Chest: Effort normal and breath sounds normal. No respiratory distress.   no wheezes.   Neurological:  alert and oriented to person, place, and time.   Skin: Skin is warm and dry. not diaphoretic.   Psychiatric:  normal mood and affect. behavior is normal. Judgment and thought content normal.      Result Review :   The following data was reviewed by: Miriam Buenrostro MD on 06/22/2022:  CMP    CMP 3/10/22   Glucose 155 (A)   BUN 9   Creatinine 0.80   Sodium 138   Potassium 3.7   Chloride 100   Calcium 9.0   Albumin 4.30   Total Bilirubin 1.0   Alkaline Phosphatase 115   AST (SGOT) 28   ALT (SGPT) 27   (A) Abnormal value            CBC    CBC 3/10/22   WBC 6.22   RBC 4.43   Hemoglobin 13.2   Hematocrit 40.0   MCV 90.3   MCH 29.8   MCHC 33.0   RDW 12.5   Platelets 265               A1C Last 3 Results    HGBA1C Last 3 Results 11/15/21 3/10/22 6/22/22   Hemoglobin A1C 8.6 8.7 9.4                       Assessment and Plan    Diagnoses and all orders for this visit:    1. Uncontrolled type 2 diabetes mellitus with hyperglycemia (HCC) (Primary)-A1c up to 9.4.  Discussed with her extensively the concerns for untreated, uncontrolled diabetes and I finally convinced her to try the metformin.  Discussed GI side effects are the most common but to take " with food and if she does have intolerance, call us and we will try different medication.  She will also meet with a dietitian and try to improve her diet.  Continue working on exercise and weight loss  -     POC Glycosylated Hemoglobin (Hb A1C)  -     POC INR  -     metFORMIN ER (GLUCOPHAGE-XR) 500 MG 24 hr tablet; 1 tablet daily with food x 1 week, then raise 2 tablets daily w/ food  Dispense: 60 tablet; Refill: 5    2. History of pulmonary embolism-INR high today.  She will hold tonight, and then go down to 7.5 milligrams 2 nights a week, 5mg 5 nights a week.  Recheck INR next week  -     warfarin (COUMADIN) 5 MG tablet; Take 1 tablet by mouth Daily.  Dispense: 30 tablet; Refill: 5  -     POC INR    h    Follow Up   Return in about 3 months (around 9/22/2022) for Next scheduled follow up.  Patient was given instructions and counseling regarding her condition or for health maintenance advice. Please see specific information pulled into the AVS if appropriate.

## 2022-06-24 NOTE — PROGRESS NOTES
"Chief Complaint  Diabetes (follow up with A1C today)    Subjective          Bailey Armstrong presents to [facility]  History of Present Illness    DM type II-her last A1c done 4 months ago was 7.5.  She has not wanted to start medication and has been working on diet, but she has felt like she has not done as well recently and has eaten more desserts recently. Also not exercising as much. Has not felt comformtable going to [facility] w/ COVID. Her insurance does cover [employer] so she may start this    History of pulmonary embolism-her last INR was 2.7 done 1 week ago.  Goal INR is 2.5-3.5    Current Outpatient Medications:   •  clonazePAM (KlonoPIN) 0.5 MG tablet, Take 0.5 mg by mouth Every Night., Disp: , Rfl:   •  hydroquinone 4 % cream, 1 application Daily As Needed., Disp: , Rfl:   •  IRON PO, Take  by mouth Daily., Disp: , Rfl:   •  OLANZapine (zyPREXA) 2.5 MG tablet, Take 0.625 mg by mouth Every Night., Disp: , Rfl:   •  omeprazole (priLOSEC) 20 MG capsule, TAKE 1 CAPSULE BY MOUTH DAILY, Disp: 90 capsule, Rfl: 3  •  PARoxetine (PAXIL) 20 MG tablet, Take 20 mg by mouth Daily., Disp: , Rfl:   •  triamcinolone (KENALOG) 0.1 % ointment, 1 application Daily As Needed., Disp: , Rfl:   •  warfarin (COUMADIN) 5 MG tablet, TAKE 1 TABLET BY MOUTH DAILY, Disp: 30 tablet, Rfl: 5  •  warfarin (COUMADIN) 7.5 MG tablet, 1 qd as directed, Disp: 30 tablet, Rfl: 5  •  metFORMIN (FORTAMET) 500 MG (OSM) 24 hr tablet, Take 1 tablet by mouth Daily With Breakfast., Disp: 30 tablet, Rfl: 5      Objective   Vital Signs:   /80 (BP Location: Right arm, Patient Position: Sitting)   Pulse 99   Temp 97.3 °F (36.3 °C) (Infrared)   Ht 158.8 cm (62.5\")   SpO2 98%   BMI 32.97 kg/m²       Physical exam  Constitutional: oriented to person, place, and time.  well-developed and well-nourished. No distress.   HENT:   Head: Normocephalic and atraumatic.   Eyes: Conjunctivae and EOM are normal.   Cardiovascular: Normal " rate, regular rhythm and normal heart sounds.  Exam reveals no gallop and no friction rub.    No murmur heard.  Pulmonary/Chest: Effort normal and breath sounds normal. No respiratory distress.   no wheezes.   Neurological:  alert and oriented to person, place, and time.   Skin: Skin is warm and dry. not diaphoretic.   Psychiatric:  normal mood and affect. behavior is normal. Judgment and thought content normal.      Result Review :   The following data was reviewed by: Miriam Buenrostro MD on 11/15/2021:  CMP    CMP 2/24/21   Glucose 145 (A)   BUN 11   Creatinine 0.74   eGFR Non African Am 77   Sodium 137   Potassium 4.1   Chloride 100   Calcium 9.1   Albumin 4.00   Total Bilirubin 0.8   Alkaline Phosphatase 106   AST (SGOT) 29   ALT (SGPT) 23   (A) Abnormal value            Lipid Panel    Lipid Panel 2/24/21   Total Cholesterol 184   Triglycerides 163 (A)   HDL Cholesterol 37 (A)   VLDL Cholesterol 29   LDL Cholesterol  118 (A)   LDL/HDL Ratio 3.09   (A) Abnormal value            TSH    TSH 2/24/21   TSH 2.180           A1C Last 3 Results    HGBA1C Last 3 Results 4/28/21 7/28/21 11/15/21   Hemoglobin A1C 7.9 7.5 8.6           Microalbumin    Microalbumin 2/24/21   Microalbumin, Urine <1.2                       Assessment and Plan    Diagnoses and all orders for this visit:    1. Type 2 diabetes, diet controlled (HCC) (Primary)-A1c up quite a bit.  She is agreeable to go ahead and start Metformin.  We discussed side effects.  Call if any problems.  We will leave her at low dose and she will continue to work on diet and exercise and we will recheck in 3 months.  She would like to see a dietitian as well so I will put in referral for this.  Eye exam due and she will schedule  -     POC Glycosylated Hemoglobin (Hb A1C)  -     metFORMIN (FORTAMET) 500 MG (OSM) 24 hr tablet; Take 1 tablet by mouth Daily With Breakfast.  Dispense: 30 tablet; Refill: 5    2. Chronic anticoagulation-INR due in about 3 weeks and she will  return for this        Follow Up   Return in about 3 months (around 2/15/2022) for Next scheduled follow up.  Patient was given instructions and counseling regarding her condition or for health maintenance advice. Please see specific information pulled into the AVS if appropriate.        123

## 2022-06-29 ENCOUNTER — TELEPHONE (OUTPATIENT)
Dept: INTERNAL MEDICINE | Facility: CLINIC | Age: 72
End: 2022-06-29

## 2022-06-29 NOTE — TELEPHONE ENCOUNTER
Advised the patient that according to Dr. Buenrostro's note from 06/22 she is to return in one week to recheck her INR. Patient verbalized a good understanding.   
PATIENT HAS CALLED REQUESTING A CALL BACK ASAP TO ADVISE ON WHETHER OR NOT SHE IST TO GET HER INR CHECKED IN ONE WEEK OR TWO.    CALL BACK NUMBER [phone]   
none

## 2022-06-29 NOTE — TELEPHONE ENCOUNTER
----- Message from Rosibel HOGAN MD sent at 6/29/2022  4:28 PM EDT -----  INR in good range continue same dose recheck next week

## 2022-06-30 ENCOUNTER — TELEPHONE (OUTPATIENT)
Dept: URGENT CARE | Facility: CLINIC | Age: 72
End: 2022-06-30

## 2022-07-08 ENCOUNTER — TELEPHONE (OUTPATIENT)
Dept: INTERNAL MEDICINE | Facility: CLINIC | Age: 72
End: 2022-07-08

## 2022-07-08 ENCOUNTER — CLINICAL SUPPORT (OUTPATIENT)
Dept: INTERNAL MEDICINE | Facility: CLINIC | Age: 72
End: 2022-07-08

## 2022-07-08 DIAGNOSIS — Z86.711 HISTORY OF PULMONARY EMBOLISM: Primary | ICD-10-CM

## 2022-07-08 LAB — INR PPP: 2.6 (ref 2–3)

## 2022-07-08 PROCEDURE — 85610 PROTHROMBIN TIME: CPT | Performed by: INTERNAL MEDICINE

## 2022-07-08 PROCEDURE — 36416 COLLJ CAPILLARY BLOOD SPEC: CPT | Performed by: INTERNAL MEDICINE

## 2022-07-08 NOTE — TELEPHONE ENCOUNTER
LVM with the patient's  (not on ADELITA) to have her call us back. Office number was provided.     HUB TO READ: Please provide the patient with the below provider comments.     ----- Message from Miriam Buenrostro MD sent at 7/8/2022  3:58 PM EDT -----  Please let pt know, INR good - would continue same dose and let's recheck in 2 weeks

## 2022-07-21 ENCOUNTER — TELEPHONE (OUTPATIENT)
Dept: INTERNAL MEDICINE | Facility: CLINIC | Age: 72
End: 2022-07-21

## 2022-07-21 ENCOUNTER — CLINICAL SUPPORT (OUTPATIENT)
Dept: INTERNAL MEDICINE | Facility: CLINIC | Age: 72
End: 2022-07-21

## 2022-07-21 DIAGNOSIS — Z86.711 HISTORY OF PULMONARY EMBOLISM: Primary | ICD-10-CM

## 2022-07-21 LAB — INR PPP: 2.3 (ref 2–3)

## 2022-07-21 PROCEDURE — 36416 COLLJ CAPILLARY BLOOD SPEC: CPT | Performed by: INTERNAL MEDICINE

## 2022-07-21 PROCEDURE — 85610 PROTHROMBIN TIME: CPT | Performed by: INTERNAL MEDICINE

## 2022-07-21 NOTE — TELEPHONE ENCOUNTER
----- Message from Miriam Buenrostro MD sent at 7/21/2022  5:11 PM EDT -----  It is slightly low so let's have her go back up to 7.5 mg 3 days a week, and 5mg the other days and recheck in 10 days

## 2022-08-01 ENCOUNTER — CLINICAL SUPPORT (OUTPATIENT)
Dept: INTERNAL MEDICINE | Facility: CLINIC | Age: 72
End: 2022-08-01

## 2022-08-01 ENCOUNTER — TELEPHONE (OUTPATIENT)
Dept: INTERNAL MEDICINE | Facility: CLINIC | Age: 72
End: 2022-08-01

## 2022-08-01 DIAGNOSIS — Z86.711 HISTORY OF PULMONARY EMBOLISM: Primary | ICD-10-CM

## 2022-08-01 LAB — INR PPP: 2.8 (ref 2–3)

## 2022-08-01 PROCEDURE — 85610 PROTHROMBIN TIME: CPT | Performed by: INTERNAL MEDICINE

## 2022-08-01 PROCEDURE — 36416 COLLJ CAPILLARY BLOOD SPEC: CPT | Performed by: INTERNAL MEDICINE

## 2022-08-01 NOTE — TELEPHONE ENCOUNTER
----- Message from Miriam Buenrostro MD sent at 8/1/2022  8:26 AM EDT -----  We can leave dose same. Let's recheck in 2 weeks since we have adjusted several times recently

## 2022-08-12 ENCOUNTER — CLINICAL SUPPORT (OUTPATIENT)
Dept: INTERNAL MEDICINE | Facility: CLINIC | Age: 72
End: 2022-08-12

## 2022-08-12 ENCOUNTER — TELEPHONE (OUTPATIENT)
Dept: INTERNAL MEDICINE | Facility: CLINIC | Age: 72
End: 2022-08-12

## 2022-08-12 DIAGNOSIS — Z79.01 CHRONIC ANTICOAGULATION: Primary | ICD-10-CM

## 2022-08-12 LAB
EXPIRATION DATE: ABNORMAL
INR PPP: 2.3 (ref 0.9–1.1)
Lab: ABNORMAL

## 2022-08-12 PROCEDURE — 85610 PROTHROMBIN TIME: CPT | Performed by: INTERNAL MEDICINE

## 2022-08-12 PROCEDURE — 36416 COLLJ CAPILLARY BLOOD SPEC: CPT | Performed by: INTERNAL MEDICINE

## 2022-08-12 NOTE — TELEPHONE ENCOUNTER
M for the patient to return our call, office number was provided      HUB TO READ: Please provide the patient with the below provider comments.     ----- Message from Rosibel HOGAN MD sent at 8/12/2022  1:15 PM EDT -----  INR is in good range.

## 2022-08-15 ENCOUNTER — TELEPHONE (OUTPATIENT)
Dept: INTERNAL MEDICINE | Facility: CLINIC | Age: 72
End: 2022-08-15

## 2022-08-15 NOTE — TELEPHONE ENCOUNTER
----- Message from Miriam Buenrostro MD sent at 8/13/2022 11:19 AM EDT -----  It looks like she was notified of results Friday, but can you ask her to return in 2 weeks for recheck as it was slightly below the 2.5 we want it at, so we can make sure it is no lower

## 2022-08-26 ENCOUNTER — CLINICAL SUPPORT (OUTPATIENT)
Dept: INTERNAL MEDICINE | Facility: CLINIC | Age: 72
End: 2022-08-26

## 2022-08-26 LAB — INR PPP: 2.5 (ref 2–3)

## 2022-08-26 PROCEDURE — 85610 PROTHROMBIN TIME: CPT | Performed by: INTERNAL MEDICINE

## 2022-08-26 PROCEDURE — 36416 COLLJ CAPILLARY BLOOD SPEC: CPT | Performed by: INTERNAL MEDICINE

## 2022-09-15 ENCOUNTER — TELEPHONE (OUTPATIENT)
Dept: INTERNAL MEDICINE | Facility: CLINIC | Age: 72
End: 2022-09-15

## 2022-09-15 NOTE — TELEPHONE ENCOUNTER
Yes, we can go ahead and do labs for the Sept appt since it has been 6months. I want to do her FLP too, so if she can come in fasting. Order is in

## 2022-09-15 NOTE — TELEPHONE ENCOUNTER
Caller: Bailey Armstrong    Relationship: Self    Best call back number: [phone]    What orders are you requesting (i.e. lab or imaging): ANNUAL LAB WORK    In what timeframe would the patient need to come in: ASAP    Where will you receive your lab/imaging services: IN OFFICE    Additional notes: PATIENT HAS APPOINTMENT ON 09/22/2022 AND WOULD LIKE TO GET LABS DRAWN BEFORE APPOINTMENT SO RESULTS WILL BE IN ON HER APPOINTMENT. PLEASE CALL PATIENT BACK WHEN ORDERS HAVE BEEN PLACED.

## 2022-09-20 ENCOUNTER — LAB (OUTPATIENT)
Dept: LAB | Facility: HOSPITAL | Age: 72
End: 2022-09-20

## 2022-09-20 DIAGNOSIS — Z79.01 CHRONIC ANTICOAGULATION: Chronic | ICD-10-CM

## 2022-09-20 DIAGNOSIS — E11.65 UNCONTROLLED TYPE 2 DIABETES MELLITUS WITH HYPERGLYCEMIA: Chronic | ICD-10-CM

## 2022-09-20 DIAGNOSIS — E78.00 PURE HYPERCHOLESTEROLEMIA: Chronic | ICD-10-CM

## 2022-09-20 LAB — HBA1C MFR BLD: 7.2 % (ref 4.8–5.6)

## 2022-09-20 PROCEDURE — 80061 LIPID PANEL: CPT | Performed by: INTERNAL MEDICINE

## 2022-09-20 PROCEDURE — 83036 HEMOGLOBIN GLYCOSYLATED A1C: CPT | Performed by: INTERNAL MEDICINE

## 2022-09-20 PROCEDURE — 80050 GENERAL HEALTH PANEL: CPT | Performed by: INTERNAL MEDICINE

## 2022-09-21 ENCOUNTER — TELEPHONE (OUTPATIENT)
Dept: INTERNAL MEDICINE | Facility: CLINIC | Age: 72
End: 2022-09-21

## 2022-09-21 DIAGNOSIS — E11.65 UNCONTROLLED TYPE 2 DIABETES MELLITUS WITH HYPERGLYCEMIA: Chronic | ICD-10-CM

## 2022-09-21 LAB
ALBUMIN SERPL-MCNC: 3.9 G/DL (ref 3.5–5.2)
ALBUMIN/GLOB SERPL: 1.1 G/DL
ALP SERPL-CCNC: 76 U/L (ref 39–117)
ALT SERPL W P-5'-P-CCNC: 30 U/L (ref 1–33)
ANION GAP SERPL CALCULATED.3IONS-SCNC: 13.5 MMOL/L (ref 5–15)
AST SERPL-CCNC: 40 U/L (ref 1–32)
BILIRUB SERPL-MCNC: 1 MG/DL (ref 0–1.2)
BUN SERPL-MCNC: 11 MG/DL (ref 8–23)
BUN/CREAT SERPL: 14.5 (ref 7–25)
CALCIUM SPEC-SCNC: 9 MG/DL (ref 8.6–10.5)
CHLORIDE SERPL-SCNC: 100 MMOL/L (ref 98–107)
CHOLEST SERPL-MCNC: 191 MG/DL (ref 0–200)
CO2 SERPL-SCNC: 23.5 MMOL/L (ref 22–29)
CREAT SERPL-MCNC: 0.76 MG/DL (ref 0.57–1)
DEPRECATED RDW RBC AUTO: 42.9 FL (ref 37–54)
EGFRCR SERPLBLD CKD-EPI 2021: 82.9 ML/MIN/1.73
ERYTHROCYTE [DISTWIDTH] IN BLOOD BY AUTOMATED COUNT: 13.3 % (ref 12.3–15.4)
GLOBULIN UR ELPH-MCNC: 3.4 GM/DL
GLUCOSE SERPL-MCNC: 113 MG/DL (ref 65–99)
HCT VFR BLD AUTO: 38.5 % (ref 34–46.6)
HDLC SERPL-MCNC: 37 MG/DL (ref 40–60)
HGB BLD-MCNC: 12.9 G/DL (ref 12–15.9)
LDLC SERPL CALC-MCNC: 119 MG/DL (ref 0–100)
LDLC/HDLC SERPL: 3.11 {RATIO}
MCH RBC QN AUTO: 29.5 PG (ref 26.6–33)
MCHC RBC AUTO-ENTMCNC: 33.5 G/DL (ref 31.5–35.7)
MCV RBC AUTO: 88.1 FL (ref 79–97)
PLATELET # BLD AUTO: 253 10*3/MM3 (ref 140–450)
PMV BLD AUTO: 10.4 FL (ref 6–12)
POTASSIUM SERPL-SCNC: 3.9 MMOL/L (ref 3.5–5.2)
PROT SERPL-MCNC: 7.3 G/DL (ref 6–8.5)
RBC # BLD AUTO: 4.37 10*6/MM3 (ref 3.77–5.28)
SODIUM SERPL-SCNC: 137 MMOL/L (ref 136–145)
TRIGL SERPL-MCNC: 195 MG/DL (ref 0–150)
TSH SERPL DL<=0.05 MIU/L-ACNC: 2.41 UIU/ML (ref 0.27–4.2)
VLDLC SERPL-MCNC: 35 MG/DL (ref 5–40)
WBC NRBC COR # BLD: 5.9 10*3/MM3 (ref 3.4–10.8)

## 2022-09-21 NOTE — TELEPHONE ENCOUNTER
Caller: Bailey Armstrong    Relationship: Self    Best call back number: [phone]    What form or medical record are you requesting: TEST RESULTS FROM 9/20/2022    Who is requesting this form or medical record from you: SELF    How would you like to receive the form or medical records (pick-up, mail, fax):  ON 9/23/2022    Timeframe paperwork needed: 9/23/2022    Additional notes: PLEASE CALL PATIENT IF THIS IS NOT GOING TO BE AVAILABLE FOR  BY THE REQUESTED DATE.

## 2022-09-22 RX ORDER — METFORMIN HYDROCHLORIDE 500 MG/1
TABLET, EXTENDED RELEASE ORAL
Qty: 180 TABLET | Refills: 0 | Status: SHIPPED | OUTPATIENT
Start: 2022-09-22 | End: 2023-01-23 | Stop reason: SDUPTHER

## 2022-10-19 ENCOUNTER — OFFICE VISIT (OUTPATIENT)
Dept: INTERNAL MEDICINE | Facility: CLINIC | Age: 72
End: 2022-10-19

## 2022-10-19 VITALS
HEIGHT: 63 IN | SYSTOLIC BLOOD PRESSURE: 132 MMHG | DIASTOLIC BLOOD PRESSURE: 74 MMHG | WEIGHT: 182 LBS | HEART RATE: 86 BPM | OXYGEN SATURATION: 99 % | BODY MASS INDEX: 32.25 KG/M2 | TEMPERATURE: 96.4 F

## 2022-10-19 DIAGNOSIS — Z23 NEED FOR INFLUENZA VACCINATION: ICD-10-CM

## 2022-10-19 DIAGNOSIS — R23.3 PETECHIAE: ICD-10-CM

## 2022-10-19 DIAGNOSIS — E11.9 CONTROLLED TYPE 2 DIABETES MELLITUS WITHOUT COMPLICATION, WITHOUT LONG-TERM CURRENT USE OF INSULIN: Primary | ICD-10-CM

## 2022-10-19 DIAGNOSIS — R74.8 ELEVATED LIVER ENZYMES: ICD-10-CM

## 2022-10-19 DIAGNOSIS — Z86.711 HISTORY OF PULMONARY EMBOLISM: Chronic | ICD-10-CM

## 2022-10-19 LAB — INR PPP: 2.8 (ref 2–3)

## 2022-10-19 PROCEDURE — 90471 IMMUNIZATION ADMIN: CPT | Performed by: INTERNAL MEDICINE

## 2022-10-19 PROCEDURE — 99214 OFFICE O/P EST MOD 30 MIN: CPT | Performed by: INTERNAL MEDICINE

## 2022-10-19 PROCEDURE — 85610 PROTHROMBIN TIME: CPT | Performed by: INTERNAL MEDICINE

## 2022-10-19 PROCEDURE — 90662 IIV NO PRSV INCREASED AG IM: CPT | Performed by: INTERNAL MEDICINE

## 2022-10-19 PROCEDURE — 36416 COLLJ CAPILLARY BLOOD SPEC: CPT | Performed by: INTERNAL MEDICINE

## 2022-11-02 RX ORDER — OMEPRAZOLE 20 MG/1
20 CAPSULE, DELAYED RELEASE ORAL DAILY
Qty: 90 CAPSULE | Refills: 3 | Status: SHIPPED | OUTPATIENT
Start: 2022-11-02

## 2022-12-01 ENCOUNTER — TELEPHONE (OUTPATIENT)
Dept: INTERNAL MEDICINE | Facility: CLINIC | Age: 72
End: 2022-12-01

## 2022-12-01 ENCOUNTER — CLINICAL SUPPORT (OUTPATIENT)
Dept: INTERNAL MEDICINE | Facility: CLINIC | Age: 72
End: 2022-12-01

## 2022-12-01 DIAGNOSIS — Z86.711 HISTORY OF PULMONARY EMBOLISM: Primary | ICD-10-CM

## 2022-12-01 LAB — INR PPP: 2.4 (ref 2–3)

## 2022-12-01 PROCEDURE — 36416 COLLJ CAPILLARY BLOOD SPEC: CPT | Performed by: INTERNAL MEDICINE

## 2022-12-01 PROCEDURE — 85610 PROTHROMBIN TIME: CPT | Performed by: INTERNAL MEDICINE

## 2022-12-01 NOTE — TELEPHONE ENCOUNTER
----- Message from Miriam Buenrostro MD sent at 12/1/2022  2:51 PM EST -----  Can you let her know, it is just a little bit below goal at 2.4. let's leave dose same, but recheck in 2 weeks

## 2022-12-20 ENCOUNTER — TELEPHONE (OUTPATIENT)
Dept: INTERNAL MEDICINE | Facility: CLINIC | Age: 72
End: 2022-12-20

## 2022-12-20 ENCOUNTER — CLINICAL SUPPORT (OUTPATIENT)
Dept: INTERNAL MEDICINE | Facility: CLINIC | Age: 72
End: 2022-12-20

## 2022-12-20 DIAGNOSIS — Z86.711 HISTORY OF PULMONARY EMBOLISM: Primary | ICD-10-CM

## 2022-12-20 LAB — INR PPP: 2.4 (ref 2–3)

## 2022-12-20 PROCEDURE — 85610 PROTHROMBIN TIME: CPT | Performed by: INTERNAL MEDICINE

## 2022-12-20 PROCEDURE — 36416 COLLJ CAPILLARY BLOOD SPEC: CPT | Performed by: INTERNAL MEDICINE

## 2022-12-20 NOTE — TELEPHONE ENCOUNTER
----- Message from Miriam Buenrostro MD sent at 12/20/2022 12:32 PM EST -----  Can you let pt know, INR is 2.4. Let's leave dose same as it is very close to goal, and recheck in 3 weeks

## 2022-12-21 NOTE — TELEPHONE ENCOUNTER
Patient states she called back yesterday and someone gave her the message.  It was not noted in her chart.  I called the patient today several times to give her the message.

## 2023-01-16 ENCOUNTER — TELEPHONE (OUTPATIENT)
Dept: INTERNAL MEDICINE | Facility: CLINIC | Age: 73
End: 2023-01-16

## 2023-01-16 ENCOUNTER — CLINICAL SUPPORT (OUTPATIENT)
Dept: INTERNAL MEDICINE | Facility: CLINIC | Age: 73
End: 2023-01-16
Payer: COMMERCIAL

## 2023-01-16 DIAGNOSIS — Z86.711 HISTORY OF PULMONARY EMBOLISM: Primary | ICD-10-CM

## 2023-01-16 LAB — INR PPP: 2.5 (ref 2.5–3.5)

## 2023-01-16 PROCEDURE — 85610 PROTHROMBIN TIME: CPT | Performed by: INTERNAL MEDICINE

## 2023-01-16 PROCEDURE — 36416 COLLJ CAPILLARY BLOOD SPEC: CPT | Performed by: INTERNAL MEDICINE

## 2023-01-16 NOTE — TELEPHONE ENCOUNTER
----- Message from Miriam Buenrostro MD sent at 1/16/2023  3:36 PM EST -----  Please let pt know, INR is good at 2.5, continue current dose, and we can recheck at her appt next month

## 2023-01-16 NOTE — TELEPHONE ENCOUNTER
Called and lvm for patient to return call, office number given.     HUB: if patient returns call please relay providers message:     ----- Message from Miriam Buenrostro MD sent at 1/16/2023  3:36 PM EST -----  Please let pt know, INR is good at 2.5, continue current dose, and we can recheck at her appt next month

## 2023-01-20 ENCOUNTER — TRANSCRIBE ORDERS (OUTPATIENT)
Dept: ADMINISTRATIVE | Facility: HOSPITAL | Age: 73
End: 2023-01-20
Payer: COMMERCIAL

## 2023-01-20 DIAGNOSIS — Z12.31 VISIT FOR SCREENING MAMMOGRAM: Primary | ICD-10-CM

## 2023-01-21 DIAGNOSIS — E11.65 UNCONTROLLED TYPE 2 DIABETES MELLITUS WITH HYPERGLYCEMIA: Chronic | ICD-10-CM

## 2023-01-21 DIAGNOSIS — E11.9 TYPE 2 DIABETES, DIET CONTROLLED: Chronic | ICD-10-CM

## 2023-01-23 RX ORDER — METFORMIN HYDROCHLORIDE EXTENDED-RELEASE TABLETS 500 MG/1
500 TABLET, FILM COATED, EXTENDED RELEASE ORAL
Qty: 60 TABLET | Refills: 0 | OUTPATIENT
Start: 2023-01-23

## 2023-01-23 RX ORDER — WARFARIN SODIUM 7.5 MG/1
TABLET ORAL
Qty: 90 TABLET | Refills: 1 | Status: SHIPPED | OUTPATIENT
Start: 2023-01-23

## 2023-01-23 RX ORDER — METFORMIN HYDROCHLORIDE 500 MG/1
1000 TABLET, EXTENDED RELEASE ORAL
Qty: 180 TABLET | Refills: 0 | Status: SHIPPED | OUTPATIENT
Start: 2023-01-23

## 2023-01-23 NOTE — TELEPHONE ENCOUNTER
Patient called wondering why the refill for Metformin was denied. Informed patient of the reason listed was that patient was no longer taking. Patient states she is still taking this and is out of medication     Please advise

## 2023-01-23 NOTE — TELEPHONE ENCOUNTER
LV: 10/19/22  NV: 2/15/23  LF: 9/22/22 180/0  LL: 9/20/22 A1C 7.2    Do you want to fill for 30 days or 90 days?  Patient is out of medication.

## 2023-03-31 ENCOUNTER — HOSPITAL ENCOUNTER (OUTPATIENT)
Dept: MAMMOGRAPHY | Facility: HOSPITAL | Age: 73
Discharge: HOME | End: 2023-03-31
Admitting: INTERNAL MEDICINE
Payer: COMMERCIAL

## 2023-03-31 DIAGNOSIS — Z12.31 VISIT FOR SCREENING MAMMOGRAM: ICD-10-CM

## 2023-03-31 PROCEDURE — 77067 SCR MAMMO BI INCL CAD: CPT

## 2023-03-31 PROCEDURE — 77067 SCR MAMMO BI INCL CAD: CPT | Performed by: RADIOLOGY

## 2023-03-31 PROCEDURE — 77063 BREAST TOMOSYNTHESIS BI: CPT

## 2023-03-31 PROCEDURE — 77063 BREAST TOMOSYNTHESIS BI: CPT | Performed by: RADIOLOGY

## 2023-04-16 DIAGNOSIS — E11.65 UNCONTROLLED TYPE 2 DIABETES MELLITUS WITH HYPERGLYCEMIA: Chronic | ICD-10-CM

## 2023-04-16 DIAGNOSIS — Z86.711 HISTORY OF PULMONARY EMBOLISM: Chronic | ICD-10-CM

## 2023-04-17 DIAGNOSIS — E11.65 UNCONTROLLED TYPE 2 DIABETES MELLITUS WITH HYPERGLYCEMIA: Chronic | ICD-10-CM

## 2023-04-17 RX ORDER — WARFARIN SODIUM 5 MG/1
5 TABLET ORAL DAILY
Qty: 30 TABLET | Refills: 0 | Status: SHIPPED | OUTPATIENT
Start: 2023-04-17

## 2023-04-17 RX ORDER — METFORMIN HYDROCHLORIDE 500 MG/1
1000 TABLET, EXTENDED RELEASE ORAL
Qty: 60 TABLET | Refills: 0 | Status: SHIPPED | OUTPATIENT
Start: 2023-04-17 | End: 2023-04-18

## 2023-04-18 RX ORDER — METFORMIN HYDROCHLORIDE 500 MG/1
1000 TABLET, EXTENDED RELEASE ORAL
Qty: 60 TABLET | Refills: 0 | Status: SHIPPED | OUTPATIENT
Start: 2023-04-18

## 2023-04-18 NOTE — TELEPHONE ENCOUNTER
Last seen 10/19/22.  Next appointment 5/11.  Last filled for 30 day yesterday.  Pharmacy is requesting a 90 day fill

## 2023-04-19 DIAGNOSIS — Z86.711 HISTORY OF PULMONARY EMBOLISM: Chronic | ICD-10-CM

## 2023-04-19 RX ORDER — WARFARIN SODIUM 5 MG/1
5 TABLET ORAL DAILY
Qty: 90 TABLET | OUTPATIENT
Start: 2023-04-19

## 2023-04-19 NOTE — TELEPHONE ENCOUNTER
Patient returned call and I relayed message that she needs to come in soon for her INR as it is overdue. Patient voiced understanding

## 2023-04-20 DIAGNOSIS — E11.65 UNCONTROLLED TYPE 2 DIABETES MELLITUS WITH HYPERGLYCEMIA: Chronic | ICD-10-CM

## 2023-04-20 RX ORDER — METFORMIN HYDROCHLORIDE 500 MG/1
1000 TABLET, EXTENDED RELEASE ORAL
Qty: 180 TABLET | OUTPATIENT
Start: 2023-04-20

## 2023-04-27 ENCOUNTER — ANTICOAGULATION VISIT (OUTPATIENT)
Dept: INTERNAL MEDICINE | Facility: CLINIC | Age: 73
End: 2023-04-27
Payer: COMMERCIAL

## 2023-04-27 ENCOUNTER — CLINICAL SUPPORT (OUTPATIENT)
Dept: INTERNAL MEDICINE | Facility: CLINIC | Age: 73
End: 2023-04-27
Payer: COMMERCIAL

## 2023-04-27 DIAGNOSIS — Z86.711 HISTORY OF PULMONARY EMBOLISM: Primary | Chronic | ICD-10-CM

## 2023-04-27 LAB — INR PPP: 2.1 (ref 0.9–1.1)

## 2023-04-28 ENCOUNTER — TELEPHONE (OUTPATIENT)
Dept: INTERNAL MEDICINE | Facility: CLINIC | Age: 73
End: 2023-04-28
Payer: COMMERCIAL

## 2023-04-28 NOTE — TELEPHONE ENCOUNTER
----- Message from Rosibel HOGAN MD sent at 4/27/2023  6:47 PM EDT -----  Please call the patient regarding her result.  INR is acceptable.  Please recheck in a month.  Please continue warfarin same dose.

## 2023-05-11 ENCOUNTER — OFFICE VISIT (OUTPATIENT)
Dept: INTERNAL MEDICINE | Facility: CLINIC | Age: 73
End: 2023-05-11
Payer: COMMERCIAL

## 2023-05-11 VITALS
BODY MASS INDEX: 33.31 KG/M2 | WEIGHT: 181 LBS | TEMPERATURE: 96.6 F | DIASTOLIC BLOOD PRESSURE: 68 MMHG | SYSTOLIC BLOOD PRESSURE: 112 MMHG | HEART RATE: 79 BPM | HEIGHT: 62 IN | OXYGEN SATURATION: 97 %

## 2023-05-11 DIAGNOSIS — E55.9 VITAMIN D DEFICIENCY: ICD-10-CM

## 2023-05-11 DIAGNOSIS — R82.71 BACTERIURIA: ICD-10-CM

## 2023-05-11 DIAGNOSIS — Z86.711 HISTORY OF PULMONARY EMBOLISM: Chronic | ICD-10-CM

## 2023-05-11 DIAGNOSIS — Z00.00 ROUTINE GENERAL MEDICAL EXAMINATION AT A HEALTH CARE FACILITY: Primary | ICD-10-CM

## 2023-05-11 DIAGNOSIS — E78.00 PURE HYPERCHOLESTEROLEMIA: Chronic | ICD-10-CM

## 2023-05-11 DIAGNOSIS — E11.65 UNCONTROLLED TYPE 2 DIABETES MELLITUS WITH HYPERGLYCEMIA: Chronic | ICD-10-CM

## 2023-05-11 LAB
BILIRUB BLD-MCNC: NEGATIVE MG/DL
CLARITY, POC: CLEAR
COLOR UR: YELLOW
EXPIRATION DATE: ABNORMAL
EXPIRATION DATE: ABNORMAL
GLUCOSE UR STRIP-MCNC: NEGATIVE MG/DL
HBA1C MFR BLD: 7.9 %
KETONES UR QL: NEGATIVE
LEUKOCYTE EST, POC: ABNORMAL
Lab: ABNORMAL
Lab: ABNORMAL
NITRITE UR-MCNC: NEGATIVE MG/ML
PH UR: 5.5 [PH] (ref 5–8)
PROT UR STRIP-MCNC: ABNORMAL MG/DL
RBC # UR STRIP: NEGATIVE /UL
SP GR UR: 1.02 (ref 1–1.03)
UROBILINOGEN UR QL: NORMAL

## 2023-05-11 PROCEDURE — 82570 ASSAY OF URINE CREATININE: CPT | Performed by: INTERNAL MEDICINE

## 2023-05-11 PROCEDURE — 82043 UR ALBUMIN QUANTITATIVE: CPT | Performed by: INTERNAL MEDICINE

## 2023-05-11 PROCEDURE — 87086 URINE CULTURE/COLONY COUNT: CPT | Performed by: INTERNAL MEDICINE

## 2023-05-11 RX ORDER — METFORMIN HYDROCHLORIDE 500 MG/1
TABLET, EXTENDED RELEASE ORAL
Qty: 90 TABLET | Refills: 5 | Status: SHIPPED | OUTPATIENT
Start: 2023-05-11

## 2023-05-11 RX ORDER — WARFARIN SODIUM 5 MG/1
5 TABLET ORAL DAILY
Qty: 30 TABLET | Refills: 5 | Status: SHIPPED | OUTPATIENT
Start: 2023-05-11

## 2023-05-11 NOTE — PROGRESS NOTES
Here for physical    Exercise:5 days a week- some balance, some cardio, some strength  Diet: not as good recently; she is on D, and B12, fe daily    DMT2- last a1c was 7.2 done in 9/22. she is on metformin  mg 2 tablets daily w/ dinner.   She feels her diet is not good recently with the stress of her 2 sisters who are dying     History of pulmonary embolism-her last INR was 2.1 done 2 weeks ago     Urinary frequency but no dysuria    She will be travelling to [place] end of May through June    L foot pain x 3 weeks ago on top of foot - she felt like she wore too tight of a shoe. Has not needed any tylenol as pain has been mild    She had a stomach virus for 3 days 2 weeksa go - she has recovered completley        Current Outpatient Medications:   •  clonazePAM (KlonoPIN) 0.5 MG tablet, Take 1 tablet by mouth Every Night., Disp: , Rfl:   •  hydroquinone 4 % cream, 1 application Daily As Needed., Disp: , Rfl:   •  IRON PO, Take  by mouth Daily., Disp: , Rfl:   •  metFORMIN ER (GLUCOPHAGE-XR) 500 MG 24 hr tablet, 1 with breakfast, 2 with dinner, Disp: 90 tablet, Rfl: 5  •  OLANZapine (zyPREXA) 2.5 MG tablet, Take 0.625 mg by mouth Every Night., Disp: , Rfl:   •  omeprazole (priLOSEC) 20 MG capsule, TAKE 1 CAPSULE BY MOUTH DAILY, Disp: 90 capsule, Rfl: 3  •  PARoxetine (PAXIL) 20 MG tablet, Take 1 tablet by mouth Daily., Disp: , Rfl:   •  triamcinolone (KENALOG) 0.1 % ointment, 1 application Daily As Needed., Disp: , Rfl:   •  warfarin (COUMADIN) 5 MG tablet, Take 1 tablet by mouth Daily., Disp: 30 tablet, Rfl: 5  •  warfarin (COUMADIN) 7.5 MG tablet, TAKE 1 TABLET BY MOUTH EVERY DAY AS DIRECTED, Disp: 90 tablet, Rfl: 1  •  glucose blood test strip, Use once daily (Patient not taking: Reported on 5/11/2023), Disp: 100 each, Rfl: 3  •  glucose monitor monitoring kit, 1 each As Needed (once daily). (Patient not taking: Reported on 5/11/2023), Disp: 1 each, Rfl: 0    The following portions of the patient's history  "were reviewed and updated as appropriate: allergies, current medications, past family history, past medical history, past social history, past surgical history and problem list.    Review of Systems   Constitutional: Negative for activity change, appetite change, fever, unexpected weight gain and unexpected weight loss.   HENT: Negative.    Eyes: Negative.    Respiratory: Negative for shortness of breath and wheezing.    Cardiovascular: Negative for chest pain, palpitations and leg swelling.   Gastrointestinal: Negative.    Endocrine: Negative.    Genitourinary: Negative for difficulty urinating and dysuria.   Musculoskeletal: Positive for arthralgias (l foot pain).   Skin: Negative.    Allergic/Immunologic: Negative for immunocompromised state.   Neurological: Negative for seizures, speech difficulty, memory problem and confusion.   Hematological: Does not bruise/bleed easily.   Psychiatric/Behavioral: Positive for stress. Negative for agitation.         Objective    /68 (BP Location: Right arm, Patient Position: Sitting)   Pulse 79   Temp 96.6 °F (35.9 °C) (Infrared)   Ht 158.2 cm (62.3\")   Wt 82.1 kg (181 lb)   SpO2 97%   BMI 32.79 kg/m²   Physical Exam   Physical Exam  Vitals and nursing note reviewed.   Constitutional:       General: She is not in acute distress.     Appearance: She is well-developed. She is not diaphoretic.   HENT:      Head: Normocephalic and atraumatic.      Right Ear: External ear normal.      Left Ear: External ear normal.      Nose: Nose normal.      Mouth/Throat:      Pharynx: No oropharyngeal exudate.   Eyes:      General: No scleral icterus.        Right eye: No discharge.         Left eye: No discharge.      Conjunctiva/sclera: Conjunctivae normal.      Pupils: Pupils are equal, round, and reactive to light.   Neck:      Thyroid: No thyromegaly.   Cardiovascular:      Rate and Rhythm: Normal rate and regular rhythm.      Heart sounds: Normal heart sounds. No murmur " heard.    No friction rub. No gallop.   Pulmonary:      Effort: Pulmonary effort is normal. No respiratory distress.      Breath sounds: Normal breath sounds. No wheezing or rales.   Abdominal:      General: Bowel sounds are normal. There is no distension.      Palpations: Abdomen is soft. There is no mass.      Tenderness: There is no abdominal tenderness. There is no guarding or rebound.   Musculoskeletal:         General: Tenderness present. No deformity. Normal range of motion.      Cervical back: Normal range of motion and neck supple.   Lymphadenopathy:      Cervical: No cervical adenopathy.   Skin:     General: Skin is warm and dry.      Coloration: Skin is not pale.      Findings: No erythema or rash. Bruising: l dorsum of the foot, very mild swelling as well.   Neurological:      Mental Status: She is alert and oriented to person, place, and time. Mental status is at baseline.      Coordination: Coordination normal.      Deep Tendon Reflexes: Reflexes normal.   Psychiatric:         Mood and Affect: Mood normal.         Behavior: Behavior normal.         Thought Content: Thought content normal.         Judgment: Judgment normal.           Assessment/Plan   Diagnoses and all orders for this visit:    1. Routine general medical examination at a health care facility (Primary)  Regular exercise/healthy diet. BSE q month. Sunscreen use encouraged. Check fasting labs  Cait done 3/23 and needs diagnostic, which is scheduled for next month  DT due but allergic  DEXA due 5/24 (normal in '21)  Shingles- she had shingrix in 4/23  She had pneumovax and prevnar  Colon is due next year  Eye exam - she will schedule when she returns from her trip  -     POC Urinalysis Dipstick, Automated  -     CBC (No Diff); Future  -     TSH Rfx On Abnormal To Free T4; Future  -     Lipid Panel; Future  -     Comprehensive Metabolic Panel; Future    2. Uncontrolled type 2 diabetes mellitus with hyperglycemia (HCC)-A1c still elevated.  We  will raise metformin to 3 a day and she will continue working on healthy diet  -     POC Glycosylated Hemoglobin (Hb A1C)  -     metFORMIN ER (GLUCOPHAGE-XR) 500 MG 24 hr tablet; 1 with breakfast, 2 with dinner  Dispense: 90 tablet; Refill: 5    3. Pure hypercholesterolemia-check levels  -     CBC (No Diff); Future  -     TSH Rfx On Abnormal To Free T4; Future  -     Lipid Panel; Future  -     Comprehensive Metabolic Panel; Future    4. History of pulmonary embolism-we will go ahead and recheck her INR before her trip to [place]  -     warfarin (COUMADIN) 5 MG tablet; Take 1 tablet by mouth Daily.  Dispense: 30 tablet; Refill: 5  -     Protime-INR; Future    5. Bacteriuria-we will check a culture and treat if positive as she does have some increase in urinary frequency  -     Urine Culture - Urine, Urine, Clean Catch; Future  -     Urine Culture - Urine, Urine, Clean Catch    6. Vitamin D deficiency-recheck today  -     Vitamin D,25-Hydroxy; Future      7.  Left foot pain-if no improvement in the next week we will x-ray.  She will try Voltaren gel on it

## 2023-05-12 ENCOUNTER — LAB (OUTPATIENT)
Dept: INTERNAL MEDICINE | Facility: CLINIC | Age: 73
End: 2023-05-12
Payer: COMMERCIAL

## 2023-05-12 DIAGNOSIS — E78.00 PURE HYPERCHOLESTEROLEMIA: Chronic | ICD-10-CM

## 2023-05-12 DIAGNOSIS — Z86.711 HISTORY OF PULMONARY EMBOLISM: Chronic | ICD-10-CM

## 2023-05-12 DIAGNOSIS — Z00.00 ROUTINE GENERAL MEDICAL EXAMINATION AT A HEALTH CARE FACILITY: ICD-10-CM

## 2023-05-12 DIAGNOSIS — E55.9 VITAMIN D DEFICIENCY: ICD-10-CM

## 2023-05-12 LAB
25(OH)D3 SERPL-MCNC: 34.1 NG/ML (ref 30–100)
ALBUMIN SERPL-MCNC: 4 G/DL (ref 3.5–5.2)
ALBUMIN UR-MCNC: 1.9 MG/DL
ALBUMIN/GLOB SERPL: 1.2 G/DL
ALP SERPL-CCNC: 89 U/L (ref 39–117)
ALT SERPL W P-5'-P-CCNC: 39 U/L (ref 1–33)
ANION GAP SERPL CALCULATED.3IONS-SCNC: 10 MMOL/L (ref 5–15)
AST SERPL-CCNC: 42 U/L (ref 1–32)
BILIRUB SERPL-MCNC: 0.7 MG/DL (ref 0–1.2)
BUN SERPL-MCNC: 9 MG/DL (ref 8–23)
BUN/CREAT SERPL: 11 (ref 7–25)
CALCIUM SPEC-SCNC: 9.7 MG/DL (ref 8.6–10.5)
CHLORIDE SERPL-SCNC: 105 MMOL/L (ref 98–107)
CHOLEST SERPL-MCNC: 186 MG/DL (ref 0–200)
CO2 SERPL-SCNC: 27 MMOL/L (ref 22–29)
CREAT SERPL-MCNC: 0.82 MG/DL (ref 0.57–1)
CREAT UR-MCNC: 139.7 MG/DL
DEPRECATED RDW RBC AUTO: 43.3 FL (ref 37–54)
EGFRCR SERPLBLD CKD-EPI 2021: 75.2 ML/MIN/1.73
ERYTHROCYTE [DISTWIDTH] IN BLOOD BY AUTOMATED COUNT: 13.4 % (ref 12.3–15.4)
GLOBULIN UR ELPH-MCNC: 3.3 GM/DL
GLUCOSE SERPL-MCNC: 150 MG/DL (ref 65–99)
HCT VFR BLD AUTO: 38.7 % (ref 34–46.6)
HDLC SERPL-MCNC: 37 MG/DL (ref 40–60)
HGB BLD-MCNC: 12.8 G/DL (ref 12–15.9)
INR PPP: 3.11 (ref 0.89–1.12)
LDLC SERPL CALC-MCNC: 110 MG/DL (ref 0–100)
LDLC/HDLC SERPL: 2.8 {RATIO}
MCH RBC QN AUTO: 29.2 PG (ref 26.6–33)
MCHC RBC AUTO-ENTMCNC: 33.1 G/DL (ref 31.5–35.7)
MCV RBC AUTO: 88.4 FL (ref 79–97)
MICROALBUMIN/CREAT UR: 13.6 MG/G
PLATELET # BLD AUTO: 271 10*3/MM3 (ref 140–450)
PMV BLD AUTO: 10.7 FL (ref 6–12)
POTASSIUM SERPL-SCNC: 4.8 MMOL/L (ref 3.5–5.2)
PROT SERPL-MCNC: 7.3 G/DL (ref 6–8.5)
PROTHROMBIN TIME: 32.3 SECONDS (ref 12.2–14.5)
RBC # BLD AUTO: 4.38 10*6/MM3 (ref 3.77–5.28)
SODIUM SERPL-SCNC: 142 MMOL/L (ref 136–145)
TRIGL SERPL-MCNC: 227 MG/DL (ref 0–150)
TSH SERPL DL<=0.05 MIU/L-ACNC: 2.72 UIU/ML (ref 0.27–4.2)
VLDLC SERPL-MCNC: 39 MG/DL (ref 5–40)
WBC NRBC COR # BLD: 6.05 10*3/MM3 (ref 3.4–10.8)

## 2023-05-12 PROCEDURE — 82306 VITAMIN D 25 HYDROXY: CPT | Performed by: INTERNAL MEDICINE

## 2023-05-12 PROCEDURE — 85610 PROTHROMBIN TIME: CPT | Performed by: INTERNAL MEDICINE

## 2023-05-12 PROCEDURE — 80061 LIPID PANEL: CPT | Performed by: INTERNAL MEDICINE

## 2023-05-12 PROCEDURE — 36415 COLL VENOUS BLD VENIPUNCTURE: CPT | Performed by: INTERNAL MEDICINE

## 2023-05-12 PROCEDURE — 80050 GENERAL HEALTH PANEL: CPT | Performed by: INTERNAL MEDICINE

## 2023-05-13 LAB — BACTERIA SPEC AEROBE CULT: NO GROWTH

## 2023-05-15 ENCOUNTER — TELEPHONE (OUTPATIENT)
Dept: INTERNAL MEDICINE | Facility: CLINIC | Age: 73
End: 2023-05-15
Payer: COMMERCIAL

## 2023-05-15 NOTE — TELEPHONE ENCOUNTER
Caller: Bailey Armstrong    Relationship: Self    Best call back number: [phone]    What form or medical record are you requesting: RECENT LAB RESULTS    Who is requesting this form or medical record from you: SELF    How would you like to receive the form or medical records (pick-up, mail, fax):   If pick-up, provide patient with address and location details    Timeframe paperwork needed: ASAP    Additional notes:

## 2023-05-16 NOTE — TELEPHONE ENCOUNTER
Miriam Buenrostro MD  to [facility] [address] Clinical Pool        4:18 PM  There is a letter in chart w/ her lab results - if it's not already been mailed, she can  a copy of it     Pt was notified and will  copy at the  today.

## 2023-05-17 ENCOUNTER — HOSPITAL ENCOUNTER (OUTPATIENT)
Dept: GENERAL RADIOLOGY | Facility: HOSPITAL | Age: 73
Discharge: HOME | End: 2023-05-17
Admitting: INTERNAL MEDICINE
Payer: COMMERCIAL

## 2023-05-17 ENCOUNTER — TELEPHONE (OUTPATIENT)
Dept: INTERNAL MEDICINE | Facility: CLINIC | Age: 73
End: 2023-05-17
Payer: COMMERCIAL

## 2023-05-17 DIAGNOSIS — M79.672 ACUTE FOOT PAIN, LEFT: Primary | ICD-10-CM

## 2023-05-17 DIAGNOSIS — M79.672 ACUTE FOOT PAIN, LEFT: ICD-10-CM

## 2023-05-17 PROCEDURE — 73630 X-RAY EXAM OF FOOT: CPT

## 2023-05-17 NOTE — TELEPHONE ENCOUNTER
Per Dr. Buenrostro:    Miriam Buenrostro MD  [facility]  [address] Clinical Pool 3 hours ago (12:22 PM)       Please let patient know the order is in      LVM on personal vm that order is in the computer and can go to any [facility] imaging facility to have done, no appt needed. Office number provided if any further questions.

## 2023-05-17 NOTE — TELEPHONE ENCOUNTER
Patient stated that Dr Buenrostro stated if her foot is not getting any better, she would order xrays. Patient is requesting that order be placed.

## 2023-05-17 NOTE — TELEPHONE ENCOUNTER
PATIENT CALLED BACK IN AND IS LEAVING FOR [place] NEXT WEEK AND WANTS TO GET THE XRAY DONE TODAY IF POSSIBLE. CALL PATIENT ONCE ORDER HAS BEEN SUBMITTED

## 2023-06-09 ENCOUNTER — HOSPITAL ENCOUNTER (OUTPATIENT)
Dept: MAMMOGRAPHY | Facility: HOSPITAL | Age: 73
Discharge: HOME | End: 2023-06-09
Payer: COMMERCIAL

## 2023-06-09 DIAGNOSIS — R92.8 ABNORMAL MAMMOGRAM: ICD-10-CM

## 2023-06-09 PROCEDURE — 77065 DX MAMMO INCL CAD UNI: CPT

## 2023-08-02 ENCOUNTER — CLINICAL SUPPORT (OUTPATIENT)
Dept: INTERNAL MEDICINE | Facility: CLINIC | Age: 73
End: 2023-08-02
Payer: COMMERCIAL

## 2023-08-02 ENCOUNTER — TELEPHONE (OUTPATIENT)
Dept: INTERNAL MEDICINE | Facility: CLINIC | Age: 73
End: 2023-08-02
Payer: COMMERCIAL

## 2023-08-02 DIAGNOSIS — Z79.01 CHRONIC ANTICOAGULATION: Primary | ICD-10-CM

## 2023-08-02 LAB — INR PPP: 2.3 (ref 0.9–1.1)

## 2023-08-02 PROCEDURE — 36416 COLLJ CAPILLARY BLOOD SPEC: CPT | Performed by: INTERNAL MEDICINE

## 2023-08-02 PROCEDURE — 85610 PROTHROMBIN TIME: CPT | Performed by: INTERNAL MEDICINE

## 2023-08-03 ENCOUNTER — TELEPHONE (OUTPATIENT)
Dept: INTERNAL MEDICINE | Facility: CLINIC | Age: 73
End: 2023-08-03
Payer: COMMERCIAL

## 2023-08-03 NOTE — PROGRESS NOTES
Chief Complaint  Diabetes (F/u), Hyperlipidemia (F/u), itching scalp (She has itching in her scalp and wants to discuss.), Nail Problem (Discuss supplement. ), B suppliement (She takes a B12 and there is a supplement that has other B vitamins can she take this also.), and Coagulation Disorder (Her INR is 3.1 today she is taking 7.5 mg x 4 days and 5 mg x 3 days.  She has done this for the past week.)    Subjective          Bailey Armstrong presents to [facility]  History of Present Illness    DM type II-last A1c was 7.9 done 3 months ago, and is 6.9 today.  She is currently on metformin 500 mg 3 daily. Diet has not been good - has travelled to [place] and to see her son and ate more sweets on the trip, but since being home it has improved  She is going to gym once a week    Shoulder pain -left shoulder hurts - she felt like she strained it during an exercise class about a month ago, but is better and ROM has improved    L foot pain - still some pain on top of foot, wearing sandals helps.  The plain x-ray showed severe arthritis in her great toe but not where the pain is.    Elevated liver enzymes-liver enzymes have been slightly above normal in the 40-50 range the last several times we have checked.  No previous liver ultrasounds. She doesn't drink (except rarely), no tylenol    Hyperlipidemia- and triglycerides 227. she has been reluctant to take cholesterol medicine.     Scalp itching- entire head itches. No dandruff;  symptoms x 6 months    Nails- she notices some ridges in her nail. On B12  currently but might switch to bcomplex    Current Outpatient Medications:     clonazePAM (KlonoPIN) 0.5 MG tablet, Take 1 tablet by mouth Every Night., Disp: , Rfl:     Cyanocobalamin (VITAMIN B-12 PO), Take 1 tablet by mouth Daily., Disp: , Rfl:     hydroquinone 4 % cream, 1 application  Daily As Needed., Disp: , Rfl:     IRON PO, Take  by mouth Daily., Disp: , Rfl:     metFORMIN ER  "(GLUCOPHAGE-XR) 500 MG 24 hr tablet, 1 with breakfast, 2 with dinner, Disp: 90 tablet, Rfl: 5    OLANZapine (zyPREXA) 2.5 MG tablet, Take 0.625 mg by mouth Every Night., Disp: , Rfl:     omeprazole (priLOSEC) 20 MG capsule, TAKE 1 CAPSULE BY MOUTH DAILY, Disp: 90 capsule, Rfl: 3    PARoxetine (PAXIL) 20 MG tablet, Take 1 tablet by mouth Daily., Disp: , Rfl:     triamcinolone (KENALOG) 0.1 % ointment, 1 application  Daily As Needed., Disp: , Rfl:     Vitamin D, Cholecalciferol, 50 MCG (2000 UT) capsule, Take 1 capsule by mouth Daily., Disp: , Rfl:     warfarin (COUMADIN) 5 MG tablet, Take 1 tablet by mouth Daily., Disp: 30 tablet, Rfl: 5    warfarin (COUMADIN) 7.5 MG tablet, TAKE 1 TABLET BY MOUTH EVERY DAY AS DIRECTED, Disp: 90 tablet, Rfl: 1    glucose blood test strip, Use once daily (Patient not taking: Reported on 8/11/2023), Disp: 100 each, Rfl: 3    glucose monitor monitoring kit, 1 each As Needed (once daily). (Patient not taking: Reported on 5/11/2023), Disp: 1 each, Rfl: 0   The following portions of the patient's history were reviewed and updated as appropriate: allergies, current medications, past family history, past medical history, past social history, past surgical history and problem list.     Objective   Vital Signs:   /68 (BP Location: Left arm, Patient Position: Sitting)   Pulse 80   Temp 97.3 øF (36.3 øC) (Infrared)   Ht 158.2 cm (62.3\")   Wt 79.4 kg (175 lb)   SpO2 97%   BMI 31.70 kg/mý       Physical exam  Constitutional: oriented to person, place, and time.  well-developed and well-nourished. No distress.   HENT:   Head: Normocephalic and atraumatic.   Eyes: Conjunctivae and EOM are normal.   Cardiovascular: Normal rate, regular rhythm and normal heart sounds.  Exam reveals no gallop and no friction rub.    No murmur heard.  Pulmonary/Chest: Effort normal and breath sounds normal. No respiratory distress.   no wheezes.   Neurological:  alert and oriented to person, place, and time. "   MS: tender over L shoulder, active and passive ROM normal  Skin: Skin is warm and dry. not diaphoretic. Scalp appears normal - no erythema, no scaling. Finger nails - some slight ridging  Psychiatric:  normal mood and affect. behavior is normal. Judgment and thought content normal.    Foot exam - Normal sensation, no lesions, pulses 2+ tender over left dorsum, no edema    Result Review :   The following data was reviewed by: Miriam Buenrostro MD on 08/11/2023:  CMP          9/20/2022    09:50 5/12/2023    09:20 8/8/2023    10:30   CMP   Glucose 113  150  121    BUN 11  9  11    Creatinine 0.76  0.82  0.70    EGFR 82.9  75.2  90.9    Sodium 137  142  139    Potassium 3.9  4.8  4.4    Chloride 100  105  103    Calcium 9.0  9.7  9.5    Total Protein 7.3  7.3  7.4    Albumin 3.90  4.0  4.4    Globulin 3.4  3.3  3.0    Total Bilirubin 1.0  0.7  0.8    Alkaline Phosphatase 76  89  86    AST (SGOT) 40  42  54    ALT (SGPT) 30  39  42    Albumin/Globulin Ratio 1.1  1.2  1.5    BUN/Creatinine Ratio 14.5  11.0  15.7    Anion Gap 13.5  10.0  10.0      CBC          9/20/2022    09:50 5/12/2023    09:20   CBC   WBC 5.90  6.05    RBC 4.37  4.38    Hemoglobin 12.9  12.8    Hematocrit 38.5  38.7    MCV 88.1  88.4    MCH 29.5  29.2    MCHC 33.5  33.1    RDW 13.3  13.4    Platelets 253  271      Lipid Panel          9/20/2022    09:50 5/12/2023    09:20   Lipid Panel   Total Cholesterol 191  186    Triglycerides 195  227    HDL Cholesterol 37  37    VLDL Cholesterol 35  39    LDL Cholesterol  119  110    LDL/HDL Ratio 3.11  2.80      TSH          9/20/2022    09:50 5/12/2023    09:20   TSH   TSH 2.410  2.720      A1C Last 3 Results          9/20/2022    09:50 5/11/2023    14:18 8/11/2023    10:04   HGBA1C Last 3 Results   Hemoglobin A1C 7.20  7.9  6.9      Microalbumin          5/11/2023    16:09   Microalbumin   Microalbumin, Urine 1.9      INR          12/20/2022    00:00 1/16/2023    00:00 4/27/2023    15:46 5/12/2023    09:20  8/2/2023    11:14 8/11/2023    00:00   Common Labsle   INR 2.40  2.50  2.1  3.11  2.3  3.10                  Assessment and Plan    Diagnoses and all orders for this visit:    1. Controlled type 2 diabetes mellitus without complication, without long-term current use of insulin (Primary)- A1c much better. Recheck in 3m  -     POC Glycosylated Hemoglobin (Hb A1C)  -     POC INR    2. Elevated liver enzymes- we will check a liver US. We discussed if it is fatty liver, gradual wt loss will help  -     Comprehensive Metabolic Panel; Future  -     POC INR  -     US Liver; Future    3. Chronic anticoagulation- INR is therapeutic today, continue current dose, recheck in 4 weeks  -     POC INR    4. Pure hypercholesterolemia- discussed indication for statins in diabetics. She declines today          Follow Up   Return in about 3 months (around 11/11/2023) for Next scheduled follow up.  Patient was given instructions and counseling regarding her condition or for health maintenance advice. Please see specific information pulled into the AVS if appropriate.

## 2023-08-08 ENCOUNTER — LAB (OUTPATIENT)
Dept: INTERNAL MEDICINE | Facility: CLINIC | Age: 73
End: 2023-08-08
Payer: COMMERCIAL

## 2023-08-08 DIAGNOSIS — R74.8 ELEVATED LIVER ENZYMES: ICD-10-CM

## 2023-08-08 PROCEDURE — 80053 COMPREHEN METABOLIC PANEL: CPT | Performed by: INTERNAL MEDICINE

## 2023-08-08 PROCEDURE — 36415 COLL VENOUS BLD VENIPUNCTURE: CPT | Performed by: INTERNAL MEDICINE

## 2023-08-09 LAB
ALBUMIN SERPL-MCNC: 4.4 G/DL (ref 3.5–5.2)
ALBUMIN/GLOB SERPL: 1.5 G/DL
ALP SERPL-CCNC: 86 U/L (ref 39–117)
ALT SERPL W P-5'-P-CCNC: 42 U/L (ref 1–33)
ANION GAP SERPL CALCULATED.3IONS-SCNC: 10 MMOL/L (ref 5–15)
AST SERPL-CCNC: 54 U/L (ref 1–32)
BILIRUB SERPL-MCNC: 0.8 MG/DL (ref 0–1.2)
BUN SERPL-MCNC: 11 MG/DL (ref 8–23)
BUN/CREAT SERPL: 15.7 (ref 7–25)
CALCIUM SPEC-SCNC: 9.5 MG/DL (ref 8.6–10.5)
CHLORIDE SERPL-SCNC: 103 MMOL/L (ref 98–107)
CO2 SERPL-SCNC: 26 MMOL/L (ref 22–29)
CREAT SERPL-MCNC: 0.7 MG/DL (ref 0.57–1)
EGFRCR SERPLBLD CKD-EPI 2021: 90.9 ML/MIN/1.73
GLOBULIN UR ELPH-MCNC: 3 GM/DL
GLUCOSE SERPL-MCNC: 121 MG/DL (ref 65–99)
POTASSIUM SERPL-SCNC: 4.4 MMOL/L (ref 3.5–5.2)
PROT SERPL-MCNC: 7.4 G/DL (ref 6–8.5)
SODIUM SERPL-SCNC: 139 MMOL/L (ref 136–145)

## 2023-08-11 ENCOUNTER — OFFICE VISIT (OUTPATIENT)
Dept: INTERNAL MEDICINE | Facility: CLINIC | Age: 73
End: 2023-08-11
Payer: COMMERCIAL

## 2023-08-11 VITALS
DIASTOLIC BLOOD PRESSURE: 68 MMHG | BODY MASS INDEX: 32.2 KG/M2 | TEMPERATURE: 97.3 F | HEIGHT: 62 IN | OXYGEN SATURATION: 97 % | SYSTOLIC BLOOD PRESSURE: 118 MMHG | WEIGHT: 175 LBS | HEART RATE: 80 BPM

## 2023-08-11 DIAGNOSIS — E78.00 PURE HYPERCHOLESTEROLEMIA: Chronic | ICD-10-CM

## 2023-08-11 DIAGNOSIS — E11.9 CONTROLLED TYPE 2 DIABETES MELLITUS WITHOUT COMPLICATION, WITHOUT LONG-TERM CURRENT USE OF INSULIN: Primary | ICD-10-CM

## 2023-08-11 DIAGNOSIS — R74.8 ELEVATED LIVER ENZYMES: ICD-10-CM

## 2023-08-11 DIAGNOSIS — Z79.01 CHRONIC ANTICOAGULATION: Chronic | ICD-10-CM

## 2023-08-11 LAB
EXPIRATION DATE: ABNORMAL
HBA1C MFR BLD: 6.9 %
INR PPP: 3.1 (ref 2.5–3.5)
Lab: ABNORMAL

## 2023-08-11 RX ORDER — MULTIVIT-MIN/IRON/FOLIC ACID/K 18-600-40
2000 CAPSULE ORAL DAILY
COMMUNITY

## 2023-09-06 ENCOUNTER — HOSPITAL ENCOUNTER (OUTPATIENT)
Dept: ULTRASOUND IMAGING | Facility: HOSPITAL | Age: 73
Discharge: HOME | End: 2023-09-06
Admitting: INTERNAL MEDICINE
Payer: COMMERCIAL

## 2023-09-06 DIAGNOSIS — R74.8 ELEVATED LIVER ENZYMES: ICD-10-CM

## 2023-09-06 PROCEDURE — 76705 ECHO EXAM OF ABDOMEN: CPT

## 2023-09-11 PROBLEM — K76.0 FATTY LIVER: Status: ACTIVE | Noted: 2023-09-11

## 2023-09-21 ENCOUNTER — CLINICAL SUPPORT (OUTPATIENT)
Dept: INTERNAL MEDICINE | Facility: CLINIC | Age: 73
End: 2023-09-21
Payer: COMMERCIAL

## 2023-09-21 ENCOUNTER — TELEPHONE (OUTPATIENT)
Dept: INTERNAL MEDICINE | Facility: CLINIC | Age: 73
End: 2023-09-21

## 2023-09-21 DIAGNOSIS — Z86.711 HISTORY OF PULMONARY EMBOLISM: Primary | ICD-10-CM

## 2023-09-21 LAB — INR PPP: 3 (ref 2–3)

## 2023-09-21 PROCEDURE — 36416 COLLJ CAPILLARY BLOOD SPEC: CPT | Performed by: INTERNAL MEDICINE

## 2023-09-21 PROCEDURE — 85610 PROTHROMBIN TIME: CPT | Performed by: INTERNAL MEDICINE

## 2023-09-21 NOTE — TELEPHONE ENCOUNTER
----- Message from Nikki Orr MA sent at 9/21/2023 10:20 AM EDT -----    ----- Message -----  From: Odalys Huizar MA  Sent: 9/21/2023   9:33 AM EDT  To: [facility]  Clinical Pool

## 2023-10-20 RX ORDER — OMEPRAZOLE 20 MG/1
20 CAPSULE, DELAYED RELEASE ORAL DAILY
Qty: 90 CAPSULE | Refills: 0 | Status: SHIPPED | OUTPATIENT
Start: 2023-10-20

## 2023-10-23 ENCOUNTER — CLINICAL SUPPORT (OUTPATIENT)
Dept: INTERNAL MEDICINE | Facility: CLINIC | Age: 73
End: 2023-10-23
Payer: COMMERCIAL

## 2023-10-23 DIAGNOSIS — Z79.01 CHRONIC ANTICOAGULATION: Primary | ICD-10-CM

## 2023-10-23 LAB — INR PPP: 3.1 (ref 0.9–1.1)

## 2023-10-23 PROCEDURE — 36416 COLLJ CAPILLARY BLOOD SPEC: CPT | Performed by: INTERNAL MEDICINE

## 2023-10-23 PROCEDURE — 85610 PROTHROMBIN TIME: CPT | Performed by: INTERNAL MEDICINE

## 2023-10-24 ENCOUNTER — TELEPHONE (OUTPATIENT)
Dept: INTERNAL MEDICINE | Facility: CLINIC | Age: 73
End: 2023-10-24
Payer: COMMERCIAL

## 2023-10-24 NOTE — TELEPHONE ENCOUNTER
----- Message from Miriam Buenrostro MD sent at 10/23/2023  6:37 PM EDT -----  Can you let her know, INR looks good, no change, recheck in 4 weeks

## 2023-11-03 ENCOUNTER — TELEPHONE (OUTPATIENT)
Dept: INTERNAL MEDICINE | Facility: CLINIC | Age: 73
End: 2023-11-03
Payer: COMMERCIAL

## 2023-11-03 NOTE — TELEPHONE ENCOUNTER
PATIENT HAS CALLED REQUESTING LAB ORDERS TO BE PUT IN IN PREPARATION FOR HER UPCOMING APPOINTMENT ON 11/09/23. PATIENT IS WANTING ORDERS TO BE PUT IN ASAP.  PATIENT IS REQUESTING A CALL BACK ONCE ORDERS HAVE BEEN PLACED.    CALL BACK NUMBER [phone] OK TO LEAVE A MESSAGE

## 2023-11-08 ENCOUNTER — LAB (OUTPATIENT)
Dept: INTERNAL MEDICINE | Facility: CLINIC | Age: 73
End: 2023-11-08
Payer: COMMERCIAL

## 2023-11-08 DIAGNOSIS — E78.00 PURE HYPERCHOLESTEROLEMIA: Chronic | ICD-10-CM

## 2023-11-08 DIAGNOSIS — E11.65 UNCONTROLLED TYPE 2 DIABETES MELLITUS WITH HYPERGLYCEMIA: Chronic | ICD-10-CM

## 2023-11-08 LAB
ALBUMIN SERPL-MCNC: 4.3 G/DL (ref 3.5–5.2)
ALBUMIN/GLOB SERPL: 1.4 G/DL
ALP SERPL-CCNC: 83 U/L (ref 39–117)
ALT SERPL W P-5'-P-CCNC: 38 U/L (ref 1–33)
ANION GAP SERPL CALCULATED.3IONS-SCNC: 8.7 MMOL/L (ref 5–15)
AST SERPL-CCNC: 50 U/L (ref 1–32)
BILIRUB SERPL-MCNC: 0.9 MG/DL (ref 0–1.2)
BUN SERPL-MCNC: 9 MG/DL (ref 8–23)
BUN/CREAT SERPL: 11.8 (ref 7–25)
CALCIUM SPEC-SCNC: 9.5 MG/DL (ref 8.6–10.5)
CHLORIDE SERPL-SCNC: 104 MMOL/L (ref 98–107)
CHOLEST SERPL-MCNC: 197 MG/DL (ref 0–200)
CO2 SERPL-SCNC: 27.3 MMOL/L (ref 22–29)
CREAT SERPL-MCNC: 0.76 MG/DL (ref 0.57–1)
DEPRECATED RDW RBC AUTO: 44.2 FL (ref 37–54)
EGFRCR SERPLBLD CKD-EPI 2021: 81.8 ML/MIN/1.73
ERYTHROCYTE [DISTWIDTH] IN BLOOD BY AUTOMATED COUNT: 13.4 % (ref 12.3–15.4)
GLOBULIN UR ELPH-MCNC: 3 GM/DL
GLUCOSE SERPL-MCNC: 136 MG/DL (ref 65–99)
HBA1C MFR BLD: 6.9 % (ref 4.8–5.6)
HCT VFR BLD AUTO: 36.7 % (ref 34–46.6)
HDLC SERPL-MCNC: 47 MG/DL (ref 40–60)
HGB BLD-MCNC: 12.2 G/DL (ref 12–15.9)
LDLC SERPL CALC-MCNC: 121 MG/DL (ref 0–100)
LDLC/HDLC SERPL: 2.49 {RATIO}
MCH RBC QN AUTO: 29.8 PG (ref 26.6–33)
MCHC RBC AUTO-ENTMCNC: 33.2 G/DL (ref 31.5–35.7)
MCV RBC AUTO: 89.7 FL (ref 79–97)
PLATELET # BLD AUTO: 285 10*3/MM3 (ref 140–450)
PMV BLD AUTO: 10.5 FL (ref 6–12)
POTASSIUM SERPL-SCNC: 4.6 MMOL/L (ref 3.5–5.2)
PROT SERPL-MCNC: 7.3 G/DL (ref 6–8.5)
RBC # BLD AUTO: 4.09 10*6/MM3 (ref 3.77–5.28)
SODIUM SERPL-SCNC: 140 MMOL/L (ref 136–145)
TRIGL SERPL-MCNC: 165 MG/DL (ref 0–150)
VLDLC SERPL-MCNC: 29 MG/DL (ref 5–40)
WBC NRBC COR # BLD: 6.43 10*3/MM3 (ref 3.4–10.8)

## 2023-11-08 PROCEDURE — 36415 COLL VENOUS BLD VENIPUNCTURE: CPT | Performed by: INTERNAL MEDICINE

## 2023-11-08 PROCEDURE — 83036 HEMOGLOBIN GLYCOSYLATED A1C: CPT | Performed by: INTERNAL MEDICINE

## 2023-11-08 PROCEDURE — 80053 COMPREHEN METABOLIC PANEL: CPT | Performed by: INTERNAL MEDICINE

## 2023-11-08 PROCEDURE — 85027 COMPLETE CBC AUTOMATED: CPT | Performed by: INTERNAL MEDICINE

## 2023-11-08 PROCEDURE — 80061 LIPID PANEL: CPT | Performed by: INTERNAL MEDICINE

## 2023-11-15 ENCOUNTER — OFFICE VISIT (OUTPATIENT)
Dept: INTERNAL MEDICINE | Facility: CLINIC | Age: 73
End: 2023-11-15
Payer: COMMERCIAL

## 2023-11-15 VITALS
BODY MASS INDEX: 31.47 KG/M2 | SYSTOLIC BLOOD PRESSURE: 112 MMHG | HEART RATE: 72 BPM | HEIGHT: 62 IN | DIASTOLIC BLOOD PRESSURE: 68 MMHG | TEMPERATURE: 97 F | OXYGEN SATURATION: 98 % | WEIGHT: 171 LBS

## 2023-11-15 DIAGNOSIS — E78.00 PURE HYPERCHOLESTEROLEMIA: Primary | Chronic | ICD-10-CM

## 2023-11-15 DIAGNOSIS — E11.9 CONTROLLED TYPE 2 DIABETES MELLITUS WITHOUT COMPLICATION, WITHOUT LONG-TERM CURRENT USE OF INSULIN: Chronic | ICD-10-CM

## 2023-11-15 DIAGNOSIS — Z86.711 HISTORY OF PULMONARY EMBOLISM: Chronic | ICD-10-CM

## 2023-11-15 DIAGNOSIS — M25.512 ACUTE PAIN OF LEFT SHOULDER: ICD-10-CM

## 2023-11-15 RX ORDER — OMEPRAZOLE 20 MG/1
20 CAPSULE, DELAYED RELEASE ORAL DAILY
Qty: 90 CAPSULE | Refills: 0 | Status: CANCELLED | OUTPATIENT
Start: 2023-11-15

## 2023-11-15 RX ORDER — ATORVASTATIN CALCIUM 10 MG/1
10 TABLET, FILM COATED ORAL DAILY
Qty: 30 TABLET | Refills: 2 | Status: SHIPPED | OUTPATIENT
Start: 2023-11-15

## 2023-11-15 RX ORDER — WARFARIN SODIUM 5 MG/1
5 TABLET ORAL DAILY
Qty: 90 TABLET | Refills: 1 | Status: SHIPPED | OUTPATIENT
Start: 2023-11-15

## 2023-11-15 RX ORDER — WARFARIN SODIUM 7.5 MG/1
TABLET ORAL
Qty: 90 TABLET | Refills: 1 | Status: CANCELLED | OUTPATIENT
Start: 2023-11-15

## 2023-11-15 RX ORDER — METFORMIN HYDROCHLORIDE 500 MG/1
TABLET, EXTENDED RELEASE ORAL
Qty: 270 TABLET | Refills: 1 | Status: SHIPPED | OUTPATIENT
Start: 2023-11-15

## 2023-11-15 NOTE — PROGRESS NOTES
Chief Complaint  Diabetes (F/u), Med Refill, and tooth pulled (She had a molar pulled 11/10/23.)    Subjective          Bailey Armstrong presents to [facility]  History of Present Illness    DM type II-A1c 6 months ago was 6.9 and A1c done last week also 6.9.  She is on metformin 500 mg 3 tablets daily.  she has a shoulder injury so has not been going to gym as she had been  Diet is good and she has lost weight intentionally.    L shoulder pain - over last 2 weeks. She feels some pain w/  movement. 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  Triglycerides are 165. she has not wanted to take a statin despite having diabetes  She does recall being on a cholesterol med before her PE and remembers being told she should not be on them but she doesn't remember why     Elevated liver enzymes-most recent labs show the transaminases are still slightly elevated with ALT of 38 and AST 50.  Her liver ultrasound done 9/23 was consistent with steatosis           Current Outpatient Medications:     clonazePAM (KlonoPIN) 0.5 MG tablet, Take 1 tablet by mouth Every Night., Disp: , Rfl:     Cyanocobalamin (VITAMIN B-12 PO), Take 1 tablet by mouth Daily., Disp: , Rfl:     glucose blood test strip, Use once daily, Disp: 100 each, Rfl: 3    glucose monitor monitoring kit, 1 each As Needed (once daily)., Disp: 1 each, Rfl: 0    hydroquinone 4 % cream, 1 application  Daily As Needed., Disp: , Rfl:     IRON PO, Take  by mouth Daily., Disp: , Rfl:     metFORMIN ER (GLUCOPHAGE-XR) 500 MG 24 hr tablet, 1 with breakfast, 2 with dinner, Disp: 270 tablet, Rfl: 1    OLANZapine (zyPREXA) 2.5 MG tablet, Take 0.625 mg by mouth Every Night., Disp: , Rfl:     omeprazole (priLOSEC) 20 MG capsule, TAKE 1 CAPSULE BY MOUTH DAILY, Disp: 90 capsule, Rfl: 0    PARoxetine (PAXIL) 20 MG tablet, Take 1 tablet by mouth Daily., Disp: , Rfl:     triamcinolone (KENALOG)  "0.1 % ointment, 1 application  Daily As Needed., Disp: , Rfl:     Vitamin D, Cholecalciferol, 50 MCG (2000 UT) capsule, Take 1 capsule by mouth Daily., Disp: , Rfl:     warfarin (COUMADIN) 5 MG tablet, Take 1 tablet by mouth Daily., Disp: 90 tablet, Rfl: 1    warfarin (COUMADIN) 7.5 MG tablet, TAKE 1 TABLET BY MOUTH EVERY DAY AS DIRECTED, Disp: 90 tablet, Rfl: 1    atorvastatin (Lipitor) 10 MG tablet, Take 1 tablet by mouth Daily., Disp: 30 tablet, Rfl: 2   The following portions of the patient's history were reviewed and updated as appropriate: allergies, current medications, past family history, past medical history, past social history, past surgical history and problem list.     Objective   Vital Signs:   /68 (BP Location: Left arm, Patient Position: Sitting)   Pulse 72   Temp 97 °F (36.1 °C) (Infrared)   Ht 158.2 cm (62.3\")   Wt 77.6 kg (171 lb)   SpO2 98%   BMI 30.98 kg/m²       Physical exam  Constitutional: oriented to person, place, and time.  well-developed and well-nourished. No distress.   HENT:   Head: Normocephalic and atraumatic.   Eyes: Conjunctivae and EOM are normal.   Cardiovascular: Normal rate, regular rhythm and normal heart sounds.  Exam reveals no gallop and no friction rub.    No murmur heard.  Pulmonary/Chest: Effort normal and breath sounds normal. No respiratory distress.   no wheezes.   Neurological:  alert and oriented to person, place, and time.   Skin: Skin is warm and dry. not diaphoretic.   MS: tender over ac joint L. Active and passive ROM normal but some pain. +crepitus  Psychiatric:  normal mood and affect. behavior is normal. Judgment and thought content normal.      Result Review :   The following data was reviewed by: Miriam Buenrostro MD on 11/15/2023:  CMP          5/12/2023    09:20 8/8/2023    10:30 11/8/2023    09:19   CMP   Glucose 150  121  136    BUN 9  11  9    Creatinine 0.82  0.70  0.76    EGFR 75.2  90.9  81.8    Sodium 142  139  140    Potassium 4.8  4.4 "  4.6    Chloride 105  103  104    Calcium 9.7  9.5  9.5    Total Protein 7.3  7.4  7.3    Albumin 4.0  4.4  4.3    Globulin 3.3  3.0  3.0    Total Bilirubin 0.7  0.8  0.9    Alkaline Phosphatase 89  86  83    AST (SGOT) 42  54  50    ALT (SGPT) 39  42  38    Albumin/Globulin Ratio 1.2  1.5  1.4    BUN/Creatinine Ratio 11.0  15.7  11.8    Anion Gap 10.0  10.0  8.7      CBC w/diff          5/12/2023    09:20 11/8/2023    09:19   CBC w/Diff   WBC 6.05  6.43    RBC 4.38  4.09    Hemoglobin 12.8  12.2    Hematocrit 38.7  36.7    MCV 88.4  89.7    MCH 29.2  29.8    MCHC 33.1  33.2    RDW 13.4  13.4    Platelets 271  285      Lipid Panel          5/12/2023    09:20 11/8/2023    09:19   Lipid Panel   Total Cholesterol 186  197    Triglycerides 227  165    HDL Cholesterol 37  47    VLDL Cholesterol 39  29    LDL Cholesterol  110  121    LDL/HDL Ratio 2.80  2.49      TSH          5/12/2023    09:20   TSH   TSH 2.720      A1C Last 3 Results          5/11/2023    14:18 8/11/2023    10:04 11/8/2023    09:19   HGBA1C Last 3 Results   Hemoglobin A1C 7.9  6.9  6.90      Microalbumin          5/11/2023    16:09   Microalbumin   Microalbumin, Urine 1.9      Lab Results   Component Value Date    KBIM75AZ 34.1 05/12/2023    [id number] 818 08/05/2020               Assessment and Plan    Diagnoses and all orders for this visit:    1. Pure hypercholesterolemia (Primary)-discussed again the importance of statins with patients with diabetes and she potentially is willing to start.  She wants me to go ahead and send in the Lipitor and she will think about it further.  She does have fatty liver with mildly elevated LFTs and discussed today with her if she does end up starting the Lipitor to return here in 4 weeks to recheck liver labs  -     Comprehensive metabolic panel; Future    2. Controlled type 2 diabetes mellitus without complication, without long-term current use of insulin-A1c below 7.  Continue metformin.  Continue working on  healthy diet.  We will recheck A1c in 3 months  -     metFORMIN ER (GLUCOPHAGE-XR) 500 MG 24 hr tablet; 1 with breakfast, 2 with dinner  Dispense: 270 tablet; Refill: 1    3. History of pulmonary embolism-INR will be due later this month  -     warfarin (COUMADIN) 5 MG tablet; Take 1 tablet by mouth Daily.  Dispense: 90 tablet; Refill: 1    4. Acute pain of left shoulder-likely either some osteoarthritis or rotator cuff injury.  We will check a plain x-ray and may try some PT or have her see Ortho depending on x-ray results  -     XR Shoulder 2+ View Left; Future    Other orders  -     atorvastatin (Lipitor) 10 MG tablet; Take 1 tablet by mouth Daily.  Dispense: 30 tablet; Refill: 2        Follow Up   Return in about 3 months (around 2/15/2024).  Patient was given instructions and counseling regarding her condition or for health maintenance advice. Please see specific information pulled into the AVS if appropriate.

## 2024-01-09 ENCOUNTER — CLINICAL SUPPORT (OUTPATIENT)
Dept: INTERNAL MEDICINE | Facility: CLINIC | Age: 74
End: 2024-01-09
Payer: COMMERCIAL

## 2024-01-09 ENCOUNTER — TELEPHONE (OUTPATIENT)
Dept: INTERNAL MEDICINE | Facility: CLINIC | Age: 74
End: 2024-01-09

## 2024-01-09 DIAGNOSIS — Z86.711 HISTORY OF PULMONARY EMBOLISM: Primary | ICD-10-CM

## 2024-01-09 LAB — INR PPP: 3.3 (ref 2–3)

## 2024-01-09 PROCEDURE — 36416 COLLJ CAPILLARY BLOOD SPEC: CPT | Performed by: INTERNAL MEDICINE

## 2024-01-09 PROCEDURE — 85610 PROTHROMBIN TIME: CPT | Performed by: INTERNAL MEDICINE

## 2024-01-09 NOTE — TELEPHONE ENCOUNTER
----- Message from Miriam Buenrostro MD sent at 1/9/2024  2:14 PM EST -----  Please let pt know, INR is in-range, continue same dose, and recheck in 4 weeks

## 2024-01-30 RX ORDER — WARFARIN SODIUM 7.5 MG/1
TABLET ORAL
Qty: 90 TABLET | Refills: 0 | Status: SHIPPED | OUTPATIENT
Start: 2024-01-30

## 2024-01-30 RX ORDER — OMEPRAZOLE 20 MG/1
20 CAPSULE, DELAYED RELEASE ORAL DAILY
Qty: 90 CAPSULE | Refills: 0 | Status: SHIPPED | OUTPATIENT
Start: 2024-01-30

## 2024-02-12 ENCOUNTER — HOSPITAL ENCOUNTER (OUTPATIENT)
Dept: GENERAL RADIOLOGY | Facility: HOSPITAL | Age: 74
Discharge: HOME | End: 2024-02-12
Payer: COMMERCIAL

## 2024-02-12 ENCOUNTER — TELEPHONE (OUTPATIENT)
Dept: INTERNAL MEDICINE | Facility: CLINIC | Age: 74
End: 2024-02-12
Payer: COMMERCIAL

## 2024-02-12 ENCOUNTER — OFFICE VISIT (OUTPATIENT)
Dept: INTERNAL MEDICINE | Facility: CLINIC | Age: 74
End: 2024-02-12
Payer: COMMERCIAL

## 2024-02-12 VITALS
OXYGEN SATURATION: 100 % | DIASTOLIC BLOOD PRESSURE: 70 MMHG | HEIGHT: 62 IN | TEMPERATURE: 97.1 F | BODY MASS INDEX: 30.98 KG/M2 | HEART RATE: 72 BPM | SYSTOLIC BLOOD PRESSURE: 122 MMHG

## 2024-02-12 DIAGNOSIS — S89.91XA RIGHT KNEE INJURY, INITIAL ENCOUNTER: Primary | ICD-10-CM

## 2024-02-12 DIAGNOSIS — S89.91XA RIGHT KNEE INJURY, INITIAL ENCOUNTER: ICD-10-CM

## 2024-02-12 PROCEDURE — 73590 X-RAY EXAM OF LOWER LEG: CPT

## 2024-02-12 PROCEDURE — 73560 X-RAY EXAM OF KNEE 1 OR 2: CPT

## 2024-02-12 PROCEDURE — 99213 OFFICE O/P EST LOW 20 MIN: CPT

## 2024-02-13 ENCOUNTER — TELEPHONE (OUTPATIENT)
Dept: INTERNAL MEDICINE | Facility: CLINIC | Age: 74
End: 2024-02-13
Payer: COMMERCIAL

## 2024-02-14 ENCOUNTER — LAB (OUTPATIENT)
Dept: INTERNAL MEDICINE | Facility: CLINIC | Age: 74
End: 2024-02-14
Payer: COMMERCIAL

## 2024-02-14 DIAGNOSIS — E78.00 PURE HYPERCHOLESTEROLEMIA: Chronic | ICD-10-CM

## 2024-02-14 DIAGNOSIS — E11.9 CONTROLLED TYPE 2 DIABETES MELLITUS WITHOUT COMPLICATION, WITHOUT LONG-TERM CURRENT USE OF INSULIN: Chronic | ICD-10-CM

## 2024-02-14 DIAGNOSIS — K76.0 FATTY LIVER: ICD-10-CM

## 2024-02-14 LAB
ALBUMIN SERPL-MCNC: 3.9 G/DL (ref 3.5–5.2)
ALBUMIN/GLOB SERPL: 1.1 G/DL
ALP SERPL-CCNC: 83 U/L (ref 39–117)
ALT SERPL W P-5'-P-CCNC: 29 U/L (ref 1–33)
ANION GAP SERPL CALCULATED.3IONS-SCNC: 13 MMOL/L (ref 5–15)
AST SERPL-CCNC: 56 U/L (ref 1–32)
BILIRUB SERPL-MCNC: 1.2 MG/DL (ref 0–1.2)
BUN SERPL-MCNC: 13 MG/DL (ref 8–23)
BUN/CREAT SERPL: 15.7 (ref 7–25)
CALCIUM SPEC-SCNC: 9.2 MG/DL (ref 8.6–10.5)
CHLORIDE SERPL-SCNC: 102 MMOL/L (ref 98–107)
CHOLEST SERPL-MCNC: 205 MG/DL (ref 0–200)
CO2 SERPL-SCNC: 24 MMOL/L (ref 22–29)
CREAT SERPL-MCNC: 0.83 MG/DL (ref 0.57–1)
DEPRECATED RDW RBC AUTO: 43.3 FL (ref 37–54)
EGFRCR SERPLBLD CKD-EPI 2021: 73.6 ML/MIN/1.73
ERYTHROCYTE [DISTWIDTH] IN BLOOD BY AUTOMATED COUNT: 13.4 % (ref 12.3–15.4)
GLOBULIN UR ELPH-MCNC: 3.4 GM/DL
GLUCOSE SERPL-MCNC: 93 MG/DL (ref 65–99)
HBA1C MFR BLD: 6.7 % (ref 4.8–5.6)
HCT VFR BLD AUTO: 36.9 % (ref 34–46.6)
HDLC SERPL-MCNC: 44 MG/DL (ref 40–60)
HGB BLD-MCNC: 12.1 G/DL (ref 12–15.9)
LDLC SERPL CALC-MCNC: 134 MG/DL (ref 0–100)
LDLC/HDLC SERPL: 2.98 {RATIO}
MCH RBC QN AUTO: 29 PG (ref 26.6–33)
MCHC RBC AUTO-ENTMCNC: 32.8 G/DL (ref 31.5–35.7)
MCV RBC AUTO: 88.5 FL (ref 79–97)
PLATELET # BLD AUTO: 278 10*3/MM3 (ref 140–450)
PMV BLD AUTO: 10.6 FL (ref 6–12)
POTASSIUM SERPL-SCNC: 4.4 MMOL/L (ref 3.5–5.2)
PROT SERPL-MCNC: 7.3 G/DL (ref 6–8.5)
RBC # BLD AUTO: 4.17 10*6/MM3 (ref 3.77–5.28)
SODIUM SERPL-SCNC: 139 MMOL/L (ref 136–145)
TRIGL SERPL-MCNC: 149 MG/DL (ref 0–150)
VLDLC SERPL-MCNC: 27 MG/DL (ref 5–40)
WBC NRBC COR # BLD AUTO: 5.96 10*3/MM3 (ref 3.4–10.8)

## 2024-02-14 PROCEDURE — 80050 GENERAL HEALTH PANEL: CPT | Performed by: INTERNAL MEDICINE

## 2024-02-14 PROCEDURE — 83036 HEMOGLOBIN GLYCOSYLATED A1C: CPT | Performed by: INTERNAL MEDICINE

## 2024-02-14 PROCEDURE — 36415 COLL VENOUS BLD VENIPUNCTURE: CPT | Performed by: INTERNAL MEDICINE

## 2024-02-14 PROCEDURE — 80061 LIPID PANEL: CPT | Performed by: INTERNAL MEDICINE

## 2024-02-15 ENCOUNTER — OFFICE VISIT (OUTPATIENT)
Dept: INTERNAL MEDICINE | Facility: CLINIC | Age: 74
End: 2024-02-15
Payer: COMMERCIAL

## 2024-02-15 VITALS
OXYGEN SATURATION: 100 % | TEMPERATURE: 97.1 F | HEIGHT: 62 IN | WEIGHT: 165 LBS | BODY MASS INDEX: 30.36 KG/M2 | HEART RATE: 80 BPM | SYSTOLIC BLOOD PRESSURE: 118 MMHG | DIASTOLIC BLOOD PRESSURE: 72 MMHG

## 2024-02-15 DIAGNOSIS — E78.00 PURE HYPERCHOLESTEROLEMIA: Chronic | ICD-10-CM

## 2024-02-15 DIAGNOSIS — M25.561 ACUTE PAIN OF RIGHT KNEE: ICD-10-CM

## 2024-02-15 DIAGNOSIS — E11.9 CONTROLLED TYPE 2 DIABETES MELLITUS WITHOUT COMPLICATION, WITHOUT LONG-TERM CURRENT USE OF INSULIN: Primary | Chronic | ICD-10-CM

## 2024-02-15 DIAGNOSIS — K76.0 FATTY LIVER: ICD-10-CM

## 2024-02-15 DIAGNOSIS — M25.512 ACUTE PAIN OF LEFT SHOULDER: ICD-10-CM

## 2024-02-15 LAB
INR PPP: 2.6 (ref 2–3)
TSH SERPL DL<=0.05 MIU/L-ACNC: 1.7 UIU/ML (ref 0.27–4.2)

## 2024-02-28 ENCOUNTER — OFFICE VISIT (OUTPATIENT)
Dept: INTERNAL MEDICINE | Facility: CLINIC | Age: 74
End: 2024-02-28
Payer: COMMERCIAL

## 2024-02-28 VITALS
HEART RATE: 92 BPM | HEIGHT: 62 IN | RESPIRATION RATE: 16 BRPM | OXYGEN SATURATION: 96 % | BODY MASS INDEX: 29.81 KG/M2 | WEIGHT: 162 LBS | TEMPERATURE: 97.7 F | DIASTOLIC BLOOD PRESSURE: 64 MMHG | SYSTOLIC BLOOD PRESSURE: 102 MMHG

## 2024-02-28 DIAGNOSIS — Z85.3 HISTORY OF BREAST CANCER: Primary | ICD-10-CM

## 2024-02-28 DIAGNOSIS — N64.4 BREAST PAIN: ICD-10-CM

## 2024-02-28 NOTE — PROGRESS NOTES
Subjective   Bailey Armstrong is a 73 y.o. female.     Chief Complaint   Patient presents with    Breast Pain     Left, noddule issues, sore to touch       PCP: Miriam Buenrostro MD    Breast Pain  Pertinent negatives include no abdominal pain, arthralgias, chest pain, coughing, fatigue, fever, myalgias, nausea, rash, vomiting or weakness.      The patient is a 73-year-old female who presents for evaluation of left breast pain.    She was diagnosed with breast cancer in 2019. She had surgery and  several weeks of radiation. She had 2 masses, one in each breast, which were removed. She had 3 surgeries on the other breast. It had not spread to the lymph nodes. She is supposed to have a diagnostic mammogram on the right breast on 05/13/2024. She is having a problem on the left breast and wants to make sure it is not an infection. She has not noticed any skin changes. Her left breast is the bigger than her right breast secondary to having extensive procedures done. She denies any nipple discharge or unintentional weight loss.    She has a family history of breast cancer.    The following portions of the patient's history were reviewed and updated as appropriate: allergies, current medications, past family history, past medical history, past social history, past surgical history and problem list.        Review of Systems   Constitutional:  Negative for fatigue, fever and unexpected weight loss.   Eyes:  Negative for blurred vision, double vision and visual disturbance.   Respiratory:  Negative for cough, shortness of breath and wheezing.    Cardiovascular:  Negative for chest pain, palpitations and leg swelling.   Gastrointestinal:  Negative for abdominal pain, constipation, diarrhea, nausea and vomiting.   Genitourinary:  Positive for breast pain. Negative for difficulty urinating, frequency and urgency.   Musculoskeletal:  Negative for arthralgias and myalgias.   Skin:  Negative for color change and rash.   Neurological:  " Negative for dizziness, weakness and headache.   Hematological:  Negative for adenopathy. Does not bruise/bleed easily.           Outpatient Medications Marked as Taking for the 2/28/24 encounter (Office Visit) with CristóbalJordyn APRN   Medication Sig Dispense Refill    clonazePAM (KlonoPIN) 0.5 MG tablet Take 1 tablet by mouth Every Night.      Cyanocobalamin (VITAMIN B-12 PO) Take 1 tablet by mouth Daily.      hydroquinone 4 % cream 1 Application Daily As Needed.      IRON PO Take  by mouth Daily.      metFORMIN ER (GLUCOPHAGE-XR) 500 MG 24 hr tablet 1 with breakfast, 2 with dinner 270 tablet 1    OLANZapine (zyPREXA) 2.5 MG tablet Take 0.625 mg by mouth Every Night.      omeprazole (priLOSEC) 20 MG capsule TAKE 1 CAPSULE BY MOUTH DAILY 90 capsule 0    PARoxetine (PAXIL) 20 MG tablet Take 1 tablet by mouth Daily.      triamcinolone (KENALOG) 0.1 % ointment 1 Application Daily As Needed.      Vitamin D, Cholecalciferol, 50 MCG (2000 UT) capsule Take 1 capsule by mouth Daily.      warfarin (COUMADIN) 5 MG tablet Take 1 tablet by mouth Daily. 90 tablet 1    warfarin (COUMADIN) 7.5 MG tablet TAKE 1 TABLET BY MOUTH EVERY DAY AS DIRECTED 90 tablet 0     Allergies   Allergen Reactions    Tetanus Toxoids Anaphylaxis    Anafranil [Clomipramine Hcl] Other (See Comments)     Severe muscle spasms     Prednisone Paresthesia     \"mouth and bottom numbness\"    Adhesive Tape Rash     blister    Latex Rash           Objective   Physical Exam  Constitutional:       Appearance: Normal appearance. She is well-developed.   HENT:      Head: Normocephalic and atraumatic.   Eyes:      General: No scleral icterus.     Conjunctiva/sclera: Conjunctivae normal.   Cardiovascular:      Rate and Rhythm: Normal rate and regular rhythm.      Heart sounds: Normal heart sounds.   Pulmonary:      Effort: Pulmonary effort is normal. No respiratory distress.      Breath sounds: Normal breath sounds.   Chest:   Breasts:     Right: Tenderness " "present.      Left: Tenderness present.      Comments: No discrete masses noted.  Abdominal:      General: Bowel sounds are normal.      Palpations: Abdomen is soft.      Tenderness: There is no abdominal tenderness.   Musculoskeletal:         General: Normal range of motion.      Cervical back: Normal range of motion.      Right lower leg: No edema.      Left lower leg: No edema.   Skin:     General: Skin is warm and dry.   Neurological:      General: No focal deficit present.      Mental Status: She is alert and oriented to person, place, and time.   Psychiatric:         Attention and Perception: Attention normal.         Mood and Affect: Mood and affect normal.         Behavior: Behavior normal. Behavior is cooperative.         Thought Content: Thought content normal.         Cognition and Memory: Cognition normal.         Judgment: Judgment normal.       Vitals:    02/28/24 1303   BP: 102/64   BP Location: Right arm   Patient Position: Sitting   Cuff Size: Adult   Pulse: 92   Resp: 16   Temp: 97.7 °F (36.5 °C)   TempSrc: Infrared   SpO2: 96%   Weight: 73.5 kg (162 lb)   Height: 158.2 cm (62.28\")   PainSc:   2   PainLoc: Breast     Body mass index is 29.36 kg/m².  Wt Readings from Last 3 Encounters:   02/28/24 73.5 kg (162 lb)   02/15/24 74.8 kg (165 lb)   11/15/23 77.6 kg (171 lb)             Assessment & Plan   Diagnoses and all orders for this visit:    1. History of breast cancer (Primary)  -     Mammo Diagnostic Digital Tomosynthesis Bilateral With CAD; Future    2. Breast pain  -     Mammo Diagnostic Digital Tomosynthesis Bilateral With CAD; Future      Breast pain  History of breast cancer. I will set up for diagnostic mammogram bilaterally.          No follow-ups on file.    I discussed my findings,recommendations, and plan of care was with the patient. They verbalized understanding and agreement.  Patient was encouraged to keep me informed of any acute changes, lack of improvement, or any new concerning " symptoms.      Transcribed from ambient dictation for PASCUAL De La Rosa by Bryan Steele.  02/28/24   14:29 EST    Patient or patient representative verbalized consent to the visit recording.  I have personally performed the services described in this document as transcribed by the above individual, and it is both accurate and complete.  PASCUAL De La Rosa  2/29/2024  10:03 EST

## 2024-03-04 ENCOUNTER — TELEPHONE (OUTPATIENT)
Dept: INTERNAL MEDICINE | Facility: CLINIC | Age: 74
End: 2024-03-04
Payer: COMMERCIAL

## 2024-03-04 NOTE — TELEPHONE ENCOUNTER
Mammogram is scheduled for 5/13/24 at 10:30 a.m.  Left message for patient and gave her the number to [facility] scheduling.

## 2024-03-04 NOTE — TELEPHONE ENCOUNTER
Caller: Bailey Armstrong    Relationship: Self    Best call back number: [phone]     What was the call regarding: PATIENTS STATES AT HER LAST APPOINTMENT AN ORDER FOR A MAMMOGRAM WAS PUT IN AND SHE WOULD LIKE TO CHECK ON THE STATUS OF THIS AND SEE IF IT IS READY TO SCHEDULE OR HAS BEEN SCHEDULED.

## 2024-03-05 ENCOUNTER — TELEPHONE (OUTPATIENT)
Dept: INTERNAL MEDICINE | Facility: CLINIC | Age: 74
End: 2024-03-05

## 2024-03-05 NOTE — TELEPHONE ENCOUNTER
Hub staff attempted to follow warm transfer process and was unsuccessful     Caller: Bailey Armstrong    Relationship to patient: Self    Best call back number: [phone]    RECEIVED A MESSAGE FROM JOSEPHINE REGARDING THE APPOINTMENT FOR HER MAMMOGRAM. PATIENT HAS QUESTIONS

## 2024-03-08 ENCOUNTER — TELEPHONE (OUTPATIENT)
Dept: INTERNAL MEDICINE | Facility: CLINIC | Age: 74
End: 2024-03-08
Payer: COMMERCIAL

## 2024-03-08 NOTE — TELEPHONE ENCOUNTER
Hub staff attempted to follow warm transfer process and was unsuccessful     Caller: Bailey Armstrong    Relationship to patient: Self    Best call back number: [phone]    PATIENT RETURNED A CALL

## 2024-03-08 NOTE — TELEPHONE ENCOUNTER
Patient has an appointment on 5/13/24 for a diagnostic right breast mammogram.  She needs both breast done but does not want to reschedule this appointment.  Can the left breast diagnostic also be added to the appointment she already has.

## 2024-03-11 NOTE — TELEPHONE ENCOUNTER
Have put a message to scheduling team to see if that appointment can be changed to reflect the bilateral order

## 2024-03-14 ENCOUNTER — TELEPHONE (OUTPATIENT)
Dept: INTERNAL MEDICINE | Facility: CLINIC | Age: 74
End: 2024-03-14
Payer: COMMERCIAL

## 2024-03-14 NOTE — TELEPHONE ENCOUNTER
----- Message from Miriam Buenrostro MD sent at 3/14/2024 12:05 PM EDT -----  Regarding: INR  Her INR is due if she can stop by for this

## 2024-03-14 NOTE — TELEPHONE ENCOUNTER
Called and spoke with patient, informed her of Dr. Buenrostro's message. She verbalized understanding and had no further questions.

## 2024-03-19 ENCOUNTER — TELEPHONE (OUTPATIENT)
Dept: INTERNAL MEDICINE | Facility: CLINIC | Age: 74
End: 2024-03-19

## 2024-03-19 ENCOUNTER — CLINICAL SUPPORT (OUTPATIENT)
Dept: INTERNAL MEDICINE | Facility: CLINIC | Age: 74
End: 2024-03-19
Payer: COMMERCIAL

## 2024-03-19 LAB — INR PPP: 3.6 (ref 0.9–1.1)

## 2024-03-19 NOTE — TELEPHONE ENCOUNTER
----- Message from Miriam Buenrostro MD sent at 3/19/2024  4:34 PM EDT -----  INR 3.6 - we can leave dose same (goal is 2.5-3.5) but let's recheck in 2 weeks since it is on high side

## 2024-03-22 ENCOUNTER — HOSPITAL ENCOUNTER (OUTPATIENT)
Dept: ULTRASOUND IMAGING | Facility: HOSPITAL | Age: 74
Discharge: HOME | End: 2024-03-22
Payer: COMMERCIAL

## 2024-03-22 ENCOUNTER — HOSPITAL ENCOUNTER (OUTPATIENT)
Dept: MAMMOGRAPHY | Facility: HOSPITAL | Age: 74
Discharge: HOME | End: 2024-03-22
Payer: COMMERCIAL

## 2024-03-22 DIAGNOSIS — Z85.3 HISTORY OF BREAST CANCER: ICD-10-CM

## 2024-03-22 DIAGNOSIS — N64.4 BREAST PAIN: ICD-10-CM

## 2024-03-22 PROCEDURE — G0279 TOMOSYNTHESIS, MAMMO: HCPCS

## 2024-03-22 PROCEDURE — 77066 DX MAMMO INCL CAD BI: CPT

## 2024-03-22 PROCEDURE — 76642 ULTRASOUND BREAST LIMITED: CPT

## 2024-04-03 ENCOUNTER — TELEPHONE (OUTPATIENT)
Dept: INTERNAL MEDICINE | Facility: CLINIC | Age: 74
End: 2024-04-03
Payer: COMMERCIAL

## 2024-04-03 ENCOUNTER — CLINICAL SUPPORT (OUTPATIENT)
Dept: INTERNAL MEDICINE | Facility: CLINIC | Age: 74
End: 2024-04-03
Payer: COMMERCIAL

## 2024-04-03 LAB — INR PPP: 3.2 (ref 0.9–1.1)

## 2024-04-03 NOTE — TELEPHONE ENCOUNTER
----- Message from PASCUAL Diego sent at 4/2/2024  8:07 PM EDT -----  Please let her know her mammogram and ultrasound results showed no concerning findings .  if the area of concern worsens or she has any additional concerns, she should follow-up for reevaluation in the clinic.  Should also do routine mammogram in 1 year.   Hide Include Location In Plan Question?: No Detail Level: Detailed

## 2024-04-03 NOTE — TELEPHONE ENCOUNTER
RIVERAM for pt to return call with office #.     HUB - please relay the following to patient. - Please let her know her mammogram and ultrasound results showed no concerning findings .  if the area of concern worsens or she has any additional concerns, she should follow-up for reevaluation in the clinic.  Should also do routine mammogram in 1 year.

## 2024-04-04 ENCOUNTER — TELEPHONE (OUTPATIENT)
Dept: INTERNAL MEDICINE | Facility: CLINIC | Age: 74
End: 2024-04-04
Payer: COMMERCIAL

## 2024-04-04 NOTE — TELEPHONE ENCOUNTER
----- Message from Miriam Buenrostro MD sent at 4/4/2024  9:17 AM EDT -----  Regarding: INR  Please let patient know INR looks good-continue same dose and recheck in 4 weeks

## 2024-04-04 NOTE — TELEPHONE ENCOUNTER
Left message on voicemail for patient.    HUB to relay:    Please let patient know INR looks good-continue same dose and recheck in 4 weeks

## 2024-04-05 NOTE — TELEPHONE ENCOUNTER
Name: Bailey Armstrong    Relationship: Self    Best Callback Number: [phone]     HUB PROVIDED THE RELAY MESSAGE FROM THE OFFICE   PATIENT VOICED UNDERSTANDING AND HAS NO FURTHER QUESTIONS AT THIS TIME    ADDITIONAL INFORMATION:

## 2024-04-05 NOTE — TELEPHONE ENCOUNTER
Left message on patient home phone to make sure she checks her cell phone message for her INR results.

## 2024-04-16 ENCOUNTER — TRANSCRIBE ORDERS (OUTPATIENT)
Dept: ADMINISTRATIVE | Facility: HOSPITAL | Age: 74
End: 2024-04-16
Payer: COMMERCIAL

## 2024-04-16 DIAGNOSIS — Z12.31 SCREENING MAMMOGRAM FOR BREAST CANCER: Primary | ICD-10-CM

## 2024-04-29 DIAGNOSIS — K21.9 GASTROESOPHAGEAL REFLUX DISEASE, UNSPECIFIED WHETHER ESOPHAGITIS PRESENT: Primary | ICD-10-CM

## 2024-04-29 RX ORDER — OMEPRAZOLE 20 MG/1
20 CAPSULE, DELAYED RELEASE ORAL DAILY
Qty: 90 CAPSULE | Refills: 0 | Status: SHIPPED | OUTPATIENT
Start: 2024-04-29

## 2024-04-29 NOTE — TELEPHONE ENCOUNTER
Rx Refill Note  Requested Prescriptions     Pending Prescriptions Disp Refills    omeprazole (priLOSEC) 20 MG capsule [Pharmacy Med Name: OMEPRAZOLE 20MG CAPSULES] 90 capsule 0     Sig: TAKE 1 CAPSULE BY MOUTH DAILY      Last office visit with prescribing clinician: 2/15/2024     Next office visit with prescribing clinician: 8/15/2024     Patsy Chicas  04/29/24, 13:20 EDT

## 2024-05-03 ENCOUNTER — TELEPHONE (OUTPATIENT)
Dept: INTERNAL MEDICINE | Facility: CLINIC | Age: 74
End: 2024-05-03
Payer: COMMERCIAL

## 2024-05-03 ENCOUNTER — CLINICAL SUPPORT (OUTPATIENT)
Dept: INTERNAL MEDICINE | Facility: CLINIC | Age: 74
End: 2024-05-03
Payer: COMMERCIAL

## 2024-05-03 DIAGNOSIS — Z86.711 HISTORY OF PULMONARY EMBOLISM: Primary | ICD-10-CM

## 2024-05-03 LAB — INR PPP: 2.9 (ref 0.9–1.1)

## 2024-05-03 PROCEDURE — 36416 COLLJ CAPILLARY BLOOD SPEC: CPT | Performed by: INTERNAL MEDICINE

## 2024-05-03 PROCEDURE — 85610 PROTHROMBIN TIME: CPT | Performed by: INTERNAL MEDICINE

## 2024-05-21 ENCOUNTER — TELEPHONE (OUTPATIENT)
Dept: INTERNAL MEDICINE | Facility: CLINIC | Age: 74
End: 2024-05-21
Payer: COMMERCIAL

## 2024-05-21 NOTE — TELEPHONE ENCOUNTER
Caller: Bailey Armstrong     Relationship: Self     Best call back number: [phone]     What is the medical concern/diagnosis: CRAMPS BEFORE THE HAVING DIARRHEA FOR LAST 8 WEEKS, 3-4 TIMES A WEEKS    What specialty or service is being requested: GI SPECIALIST    What is the provider, practice or medical service name: [facility] PLACE WHERE SHE HAS HAD HER COLONOSCOPY    Any additional details: PLEASE LET PATIENT KNOW IF THERE ARE ANY QUESTIONS OR CONCERNS.

## 2024-05-31 ENCOUNTER — OFFICE VISIT (OUTPATIENT)
Dept: INTERNAL MEDICINE | Facility: CLINIC | Age: 74
End: 2024-05-31
Payer: COMMERCIAL

## 2024-05-31 VITALS
BODY MASS INDEX: 30.36 KG/M2 | SYSTOLIC BLOOD PRESSURE: 126 MMHG | HEART RATE: 72 BPM | TEMPERATURE: 96.8 F | DIASTOLIC BLOOD PRESSURE: 78 MMHG | WEIGHT: 165 LBS | OXYGEN SATURATION: 97 % | HEIGHT: 62 IN

## 2024-05-31 DIAGNOSIS — Z12.11 COLON CANCER SCREENING: ICD-10-CM

## 2024-05-31 DIAGNOSIS — E11.9 CONTROLLED TYPE 2 DIABETES MELLITUS WITHOUT COMPLICATION, WITHOUT LONG-TERM CURRENT USE OF INSULIN: Primary | Chronic | ICD-10-CM

## 2024-05-31 DIAGNOSIS — Z79.01 CHRONIC ANTICOAGULATION: Chronic | ICD-10-CM

## 2024-05-31 DIAGNOSIS — R19.7 DIARRHEA, UNSPECIFIED TYPE: ICD-10-CM

## 2024-05-31 LAB
EXPIRATION DATE: ABNORMAL
HBA1C MFR BLD: 6.8 % (ref 4.5–5.7)
INR PPP: 2.8 (ref 0.9–1.1)
Lab: ABNORMAL

## 2024-05-31 PROCEDURE — 83036 HEMOGLOBIN GLYCOSYLATED A1C: CPT | Performed by: INTERNAL MEDICINE

## 2024-05-31 PROCEDURE — 36416 COLLJ CAPILLARY BLOOD SPEC: CPT | Performed by: INTERNAL MEDICINE

## 2024-05-31 PROCEDURE — 85610 PROTHROMBIN TIME: CPT | Performed by: INTERNAL MEDICINE

## 2024-05-31 PROCEDURE — 99214 OFFICE O/P EST MOD 30 MIN: CPT | Performed by: INTERNAL MEDICINE

## 2024-05-31 RX ORDER — METFORMIN HYDROCHLORIDE 500 MG/1
TABLET, EXTENDED RELEASE ORAL
Qty: 90 TABLET | Refills: 1 | Status: SHIPPED | OUTPATIENT
Start: 2024-05-31

## 2024-05-31 NOTE — PROGRESS NOTES
Here for physical    Exercise: walking some and will start going back to the gym and doing classes again - had been busy w/ appts  Diet: not as good recently- eating more ice cream w/ the heat; she is on D, B12, fe daily     DMT2- A1c was 6.8 in 5/24, and is 7.1 now. she is on metformin  mg 1 qd; she did have diarrhea w/ 2 daily and this is better w/ 1 daily    History of pulmonary embolism-her last INR was 7/27 and was 2.8. she is on 7.5mg 3d/week, and 5mg 4d/week. No longer seeing  pulmonary htn clinic     Hyperlipidemia- she has declined statin. LDL is 134,     Fatty liver - ast 48, liver US was done in '23            Current Outpatient Medications:     clonazePAM (KlonoPIN) 0.5 MG tablet, Take 1 tablet by mouth Every Night., Disp: , Rfl:     Cyanocobalamin (VITAMIN B-12 PO), Take 1 tablet by mouth Daily., Disp: , Rfl:     hydroquinone 4 % cream, 1 Application Daily As Needed., Disp: , Rfl:     IRON PO, Take  by mouth Daily., Disp: , Rfl:     metFORMIN ER (GLUCOPHAGE-XR) 500 MG 24 hr tablet, 1 qd with food, Disp: 90 tablet, Rfl: 1    OLANZapine (zyPREXA) 2.5 MG tablet, Take 0.625 mg by mouth Every Night., Disp: , Rfl:     omeprazole (priLOSEC) 20 MG capsule, TAKE 1 CAPSULE BY MOUTH DAILY, Disp: 90 capsule, Rfl: 0    PARoxetine (PAXIL) 20 MG tablet, Take 1 tablet by mouth Daily., Disp: , Rfl:     triamcinolone (KENALOG) 0.1 % ointment, 1 Application Daily As Needed., Disp: , Rfl:     Vitamin D, Cholecalciferol, 50 MCG (2000 UT) capsule, Take 1 capsule by mouth Daily., Disp: , Rfl:     warfarin (COUMADIN) 5 MG tablet, Take 1 tablet by mouth Daily., Disp: 90 tablet, Rfl: 1    warfarin (COUMADIN) 7.5 MG tablet, TAKE 1 TABLET BY MOUTH EVERY DAY AS DIRECTED, Disp: 90 tablet, Rfl: 0    atorvastatin (Lipitor) 10 MG tablet, Take 1 tablet by mouth Daily. (Patient not taking: Reported on 8/15/2024), Disp: 30 tablet, Rfl: 2    glucose blood test strip, Use once daily (Patient not taking: Reported on 8/15/2024),  "Disp: 100 each, Rfl: 3    glucose monitor monitoring kit, 1 each As Needed (once daily). (Patient not taking: Reported on 8/15/2024), Disp: 1 each, Rfl: 0    The following portions of the patient's history were reviewed and updated as appropriate: allergies, current medications, past family history, past medical history, past social history, past surgical history and problem list.    Review of Systems   Constitutional:  Positive for activity change (not exercising as much) and unexpected weight gain. Negative for appetite change, fever and unexpected weight loss.   HENT: Negative.     Eyes:  Positive for visual disturbance (does have ptosis on left eye and also has cataracts - may have surgery).   Respiratory:  Negative for shortness of breath and wheezing.    Cardiovascular:  Negative for chest pain, palpitations and leg swelling.   Gastrointestinal: Negative.    Endocrine: Negative.    Genitourinary:  Positive for urgency (does have some urge incontinece - she did do pelvic floor PT in the past which helped). Negative for difficulty urinating and dysuria.   Skin: Negative.    Allergic/Immunologic: Negative for immunocompromised state.   Neurological:  Negative for seizures, speech difficulty, memory problem and confusion.   Hematological:  Does not bruise/bleed easily.   Psychiatric/Behavioral:  Negative for agitation.          Objective    /72 (BP Location: Right arm, Patient Position: Sitting, Cuff Size: Adult)   Pulse 76   Temp 98 °F (36.7 °C) (Infrared)   Resp 18   Ht 157.5 cm (62\")   Wt 78.2 kg (172 lb 6.4 oz)   SpO2 98%   BMI 31.53 kg/m²   Physical Exam   Physical Exam  Vitals and nursing note reviewed.   Constitutional:       General: She is not in acute distress.     Appearance: She is well-developed. She is not diaphoretic.   HENT:      Head: Normocephalic and atraumatic.      Right Ear: External ear normal.      Left Ear: External ear normal.      Nose: Nose normal.      Mouth/Throat:      " Pharynx: No oropharyngeal exudate.   Eyes:      General: No scleral icterus.        Right eye: No discharge.         Left eye: No discharge.      Conjunctiva/sclera: Conjunctivae normal.      Pupils: Pupils are equal, round, and reactive to light.   Neck:      Thyroid: No thyromegaly.   Cardiovascular:      Rate and Rhythm: Normal rate and regular rhythm.      Heart sounds: Normal heart sounds. No murmur heard.     No friction rub. No gallop.   Pulmonary:      Effort: Pulmonary effort is normal. No respiratory distress.      Breath sounds: Normal breath sounds. No wheezing or rales.   Abdominal:      General: Bowel sounds are normal. There is no distension.      Palpations: Abdomen is soft. There is no mass.      Tenderness: There is no abdominal tenderness. There is no guarding or rebound.   Musculoskeletal:         General: No deformity. Normal range of motion.      Cervical back: Normal range of motion and neck supple.   Lymphadenopathy:      Cervical: No cervical adenopathy.   Skin:     General: Skin is warm and dry.      Coloration: Skin is not pale.      Findings: No erythema or rash.   Neurological:      Mental Status: She is alert and oriented to person, place, and time.      Coordination: Coordination normal.      Deep Tendon Reflexes: Reflexes normal.   Psychiatric:         Behavior: Behavior normal.         Thought Content: Thought content normal.         Judgment: Judgment normal.       Foot exam - Normal sensation, no lesions, pulses 2+     Assessment/Plan   Diagnoses and all orders for this visit:    1. Routine general medical examination at a health care facility (Primary)  -     CBC (No Diff); Future  -     TSH Rfx On Abnormal To Free T4; Future  -     Lipid Panel; Future  -     Comprehensive Metabolic Panel; Future  Regular exercise/healthy diet. BSE q month. Sunscreen use encouraged. Check fasting labs  Cait done 3/25  DT due but allergic  DEXA due now (normal in '21)- we will order  Shingles- she  had shingrix in 4/23  She had pneumovax and prevnar  Colon ca screening - she did cologuard 6/24 and was neg, so we can repeat in '27  Eye exam - had  RSV- discussed  Flu, covid - she will get at the pharmacy  Pt has living will and will bring copy in    2. Pure hypercholesterolemia- discussed statin again w/ pt but she declines  -     CBC (No Diff); Future  -     TSH Rfx On Abnormal To Free T4; Future  -     Lipid Panel; Future  -     Comprehensive Metabolic Panel; Future    3. History of pulmonary embolism- she is on warfarin, INR therapeutic today, recheck in 4 weeks    4. Controlled type 2 diabetes mellitus without complication, without long-term current use of insulin  -     Hemoglobin A1c; Future  -     Microalbumin / Creatinine Urine Ratio - Urine, Clean Catch; Future    5. Fatty liver- continue working on healthy diet    6.  Vitamin D deficiency- level looks good, continue current dose  -     Vitamin D,25-Hydroxy; Future

## 2024-05-31 NOTE — PROGRESS NOTES
Chief Complaint  Diarrhea (Couple months)    Subjective          Bailey Armstrong presents to [facility]  History of Present Illness    Diarrhea-x several months. we had her hold the metformin last week and the diarrhea has stopped.  With the metformin she was having approximately 2 loose stool a day.  Now she is back to once a day and bowel movement is solid  Her last colonoscopy was 10 years ago w/ Dr perkins    DM type II-last A1c done 3 months ago was 6.7.  She had been on metformin 500 mg, 3 daily.she stopped it about a week ago (see above) she has been watching diet fairly well and is walking daily and going to gym once a week    History of PE-patient on warfarin.  She is currently taking 5 mg 3 days a week and 7.5 mg 4 days a week.  INR is 2.8 today      Current Outpatient Medications:     clonazePAM (KlonoPIN) 0.5 MG tablet, Take 1 tablet by mouth Every Night., Disp: , Rfl:     Cyanocobalamin (VITAMIN B-12 PO), Take 1 tablet by mouth Daily., Disp: , Rfl:     glucose blood test strip, Use once daily, Disp: 100 each, Rfl: 3    glucose monitor monitoring kit, 1 each As Needed (once daily)., Disp: 1 each, Rfl: 0    hydroquinone 4 % cream, 1 Application Daily As Needed., Disp: , Rfl:     IRON PO, Take  by mouth Daily., Disp: , Rfl:     metFORMIN ER (GLUCOPHAGE-XR) 500 MG 24 hr tablet, 1 qd with food, Disp: 90 tablet, Rfl: 1    OLANZapine (zyPREXA) 2.5 MG tablet, Take 0.625 mg by mouth Every Night., Disp: , Rfl:     omeprazole (priLOSEC) 20 MG capsule, TAKE 1 CAPSULE BY MOUTH DAILY, Disp: 90 capsule, Rfl: 0    PARoxetine (PAXIL) 20 MG tablet, Take 1 tablet by mouth Daily., Disp: , Rfl:     triamcinolone (KENALOG) 0.1 % ointment, 1 Application Daily As Needed., Disp: , Rfl:     Vitamin D, Cholecalciferol, 50 MCG (2000 UT) capsule, Take 1 capsule by mouth Daily., Disp: , Rfl:     warfarin (COUMADIN) 5 MG tablet, Take 1 tablet by mouth Daily., Disp: 90 tablet, Rfl: 1    warfarin  "(COUMADIN) 7.5 MG tablet, TAKE 1 TABLET BY MOUTH EVERY DAY AS DIRECTED, Disp: 90 tablet, Rfl: 0    atorvastatin (Lipitor) 10 MG tablet, Take 1 tablet by mouth Daily., Disp: 30 tablet, Rfl: 2   The following portions of the patient's history were reviewed and updated as appropriate: allergies, current medications, past family history, past medical history, past social history, past surgical history and problem list.     Objective   Vital Signs:   /78 (BP Location: Left arm, Patient Position: Sitting, Cuff Size: Adult)   Pulse 72   Temp 96.8 °F (36 °C)   Ht 158.1 cm (62.25\")   Wt 74.8 kg (165 lb)   SpO2 97%   BMI 29.94 kg/m²       Physical exam  Constitutional: oriented to person, place, and time.  well-developed and well-nourished. No distress.   HENT:   Head: Normocephalic and atraumatic.   Eyes: Conjunctivae and EOM are normal.   Cardiovascular: Normal rate, regular rhythm and normal heart sounds.  Exam reveals no gallop and no friction rub.    No murmur heard.  Pulmonary/Chest: Effort normal and breath sounds normal. No respiratory distress.   no wheezes.   Abd: Soft, mild mid abdominal tenderness but no rebound or guarding, nondistended  Neurological:  alert and oriented to person, place, and time.   Skin: Skin is warm and dry. not diaphoretic.   Psychiatric:  normal mood and affect. behavior is normal. Judgment and thought content normal.      Result Review :   The following data was reviewed by: Miriam Buenrostro MD on 05/31/2024:  CMP          8/8/2023    10:30 11/8/2023    09:19 2/14/2024    11:37   CMP   Glucose 121  136  93    BUN 11  9  13    Creatinine 0.70  0.76  0.83    EGFR 90.9  81.8  73.6    Sodium 139  140  139    Potassium 4.4  4.6  4.4    Chloride 103  104  102    Calcium 9.5  9.5  9.2    Total Protein 7.4  7.3  7.3    Albumin 4.4  4.3  3.9    Globulin 3.0  3.0  3.4    Total Bilirubin 0.8  0.9  1.2    Alkaline Phosphatase 86  83  83    AST (SGOT) 54  50  56    ALT (SGPT) 42  38  29  "   Albumin/Globulin Ratio 1.5  1.4  1.1    BUN/Creatinine Ratio 15.7  11.8  15.7    Anion Gap 10.0  8.7  13.0      CBC          11/8/2023    09:19 2/14/2024    11:37   CBC   WBC 6.43  5.96    RBC 4.09  4.17    Hemoglobin 12.2  12.1    Hematocrit 36.7  36.9    MCV 89.7  88.5    MCH 29.8  29.0    MCHC 33.2  32.8    RDW 13.4  13.4    Platelets 285  278      Lipid Panel          11/8/2023    09:19 2/14/2024    11:37   Lipid Panel   Total Cholesterol 197  205    Triglycerides 165  149    HDL Cholesterol 47  44    VLDL Cholesterol 29  27    LDL Cholesterol  121  134    LDL/HDL Ratio 2.49  2.98      TSH          2/14/2024    11:37   TSH   TSH 1.700      A1C Last 3 Results          11/8/2023    09:19 2/14/2024    11:37 5/31/2024    11:46   HGBA1C Last 3 Results   Hemoglobin A1C 6.90  6.70  6.8      Lab Results   Component Value Date    DTGM75AY 34.1 05/12/2023    [id number] 818 08/05/2020               Assessment and Plan    Diagnoses and all orders for this visit:    1. Controlled type 2 diabetes mellitus without complication, without long-term current use of insulin (Primary)-patient with diarrhea with higher doses of metformin.  We discussed different options and she would like to try a lower dose of metformin so we will have her do 500 mg a day.  We will recheck A1c in 3 months.  We did discuss some of the other medication options besides metformin.  She is adamant that she prefers to be on just 1 medication so if the low-dose metformin does not keep her A1c under 7, we we will try a different class and stop the metformin  -     POC Glycosylated Hemoglobin (Hb A1C)  -     metFORMIN ER (GLUCOPHAGE-XR) 500 MG 24 hr tablet; 1 qd with food  Dispense: 90 tablet; Refill: 1    2. Diarrhea, unspecified type-resolved after discontinuation of metformin.  She will let me know if it restarts with the low-dose metformin    3. Chronic anticoagulation-INR is therapeutic today.  Recheck in 4 weeks  -     POCT INR    4. Colon cancer  screening-she does not want to do a colonoscopy but is willing to do Cologuard  -     Cologuard - Stool, Per Rectum; Future        Follow Up   Return in about 3 months (around 8/31/2024) for Next scheduled follow up.  Patient was given instructions and counseling regarding her condition or for health maintenance advice. Please see specific information pulled into the AVS if appropriate.

## 2024-06-28 NOTE — PROGRESS NOTES
Chief Complaint  Diabetes, history of pulmonary embolism (INR checked today, INR today 2.8), and Results (Would like to go over her lab work.)    Subjective          Bailey Armstrong presents to [facility]  History of Present Illness    DMT2- last a1c was 7.2 done last month and down from 9.4 done 4 months ago.  She is on metformin  mg 2 tablets daily w/ dinner. She is working on diet; she is going to gym 4d/week.  She has tolerated the metformin so far.   Most recent LDL is 119.     History of pulmonary embolism-her last INR was 2.5 done 2 months ago. Today it is 2.8     Elevated AST - her AST is 40 w/ most recent labs.  Previous LFTs were normal    She has noticed some red spots on her lower extremities that has been there for several months, no where else on her body    She has had a lot of stresses with her best friend having dementia and her sister with Parkinson's.      Current Outpatient Medications:   •  clonazePAM (KlonoPIN) 0.5 MG tablet, Take 0.5 mg by mouth Every Night., Disp: , Rfl:   •  glucose blood test strip, Use once daily, Disp: 100 each, Rfl: 3  •  glucose monitor monitoring kit, 1 each As Needed (once daily)., Disp: 1 each, Rfl: 0  •  hydroquinone 4 % cream, 1 application Daily As Needed., Disp: , Rfl:   •  IRON PO, Take  by mouth Daily., Disp: , Rfl:   •  metFORMIN ER (GLUCOPHAGE-XR) 500 MG 24 hr tablet, 1 TABLET DAILY WITH FOOD FOR 1 WEEK THEN RAISE 2 TABLETS DAILY WITH FOOD (Patient taking differently: 2 tablets Daily.), Disp: 180 tablet, Rfl: 0  •  OLANZapine (zyPREXA) 2.5 MG tablet, Take 0.625 mg by mouth Every Night., Disp: , Rfl:   •  omeprazole (priLOSEC) 20 MG capsule, TAKE 1 CAPSULE BY MOUTH DAILY, Disp: 90 capsule, Rfl: 3  •  PARoxetine (PAXIL) 20 MG tablet, Take 20 mg by mouth Daily., Disp: , Rfl:   •  triamcinolone (KENALOG) 0.1 % ointment, 1 application Daily As Needed., Disp: , Rfl:   •  warfarin (COUMADIN) 5 MG tablet, Take 1 tablet by mouth  I have been asked to see this delightful 72-year-old woman who has undergone resection of a left lingular lung cancer.  History of present illness:  Mrs. Campos was being evaluated for an abdominal hernia when CT scan showed a mass in her left lung.  PET scanning 2024 showed 2 subpleural densities measuring 2.3 cm in the lingula and 1.4 cm in the left lower lobe.  The left lower lobe SUV was only 1.4 where as the lingular lesion was 8.4.  Biopsy of the left lower lingular mass was performed 2024 demonstrating adenocarcinoma.  She underwent EBUS 04/10/2024 with findings of no evidence of metastatic disease in lymph node levels 7 through 11.  On 05/10/2024 she underwent robotic assisted resection and hilar and mediastinal lymph node dissection.  Pathology demonstrated a 2.8 cm lingular lesion with lymphovascular invasion and an inability to discern whether there was visceral pleural invasion.This was a grade 3 tumor.  Lymph nodes removed from the hilum, level 9, 6, 5 and 4 L x3.  None of these were involved with metastatic disease.  She also underwent transthoracic echo with cardioversion of atrial flutter in the postoperative.  On 05/15/2024.  She has recovered quite well from surgery.  She has not had no further supraventricular tachycardia.  She has no complaints regarding her breathing.  She has never been limited with her activities of daily living and although she has a diagnosis of restrictive reversible obstructive airways disease does not use meter dose inhalers.  She did smoke quarter pack a day from age [age].  Past medical history: Reversible obstructive airways disease  PSVT   Hypertension longstanding without heart attack or stroke  Hyperlipidemia  She denies a diagnosis of obstructive sleep apnea  Adult onset diabetes longstanding not insulin-requiring with no neuropathy  Depression/anxiety developed after multiple close friends and family members  during COVID now well-controlled  Past  "Daily., Disp: 30 tablet, Rfl: 5  •  warfarin (COUMADIN) 7.5 MG tablet, TAKE 1 TABLET BY MOUTH EVERY DAY AS DIRECTED, Disp: 30 tablet, Rfl: 5      Objective   Vital Signs:   /74 (BP Location: Left arm, Patient Position: Sitting)   Pulse 86   Temp 96.4 °F (35.8 °C) (Infrared)   Ht 158.8 cm (62.5\")   Wt 82.6 kg (182 lb)   SpO2 99%   BMI 32.76 kg/m²       Physical exam  Constitutional: oriented to person, place, and time.  well-developed and well-nourished. No distress.   HENT:   Head: Normocephalic and atraumatic.   Eyes: Conjunctivae and EOM are normal.   Cardiovascular: Normal rate, regular rhythm and normal heart sounds.  Exam reveals no gallop and no friction rub.    No murmur heard.  Pulmonary/Chest: Effort normal and breath sounds normal. No respiratory distress.   no wheezes.   Neurological:  alert and oriented to person, place, and time.   Skin: Skin is warm and dry. not diaphoretic. Petechia on B pretibia  Psychiatric:  normal mood and affect. behavior is normal. Judgment and thought content normal.      Result Review :   The following data was reviewed by: Miriam Buenrostro MD on 10/19/2022:  CMP    CMP 3/10/22 9/20/22   Glucose 155 (A) 113 (A)   BUN 9 11   Creatinine 0.80 0.76   Sodium 138 137   Potassium 3.7 3.9   Chloride 100 100   Calcium 9.0 9.0   Albumin 4.30 3.90   Total Bilirubin 1.0 1.0   Alkaline Phosphatase 115 76   AST (SGOT) 28 40 (A)   ALT (SGPT) 27 30   (A) Abnormal value            CBC    CBC 3/10/22 9/20/22   WBC 6.22 5.90   RBC 4.43 4.37   Hemoglobin 13.2 12.9   Hematocrit 40.0 38.5   MCV 90.3 88.1   MCH 29.8 29.5   MCHC 33.0 33.5   RDW 12.5 13.3   Platelets 265 253           Lipid Panel    Lipid Panel 9/20/22   Total Cholesterol 191   Triglycerides 195 (A)   HDL Cholesterol 37 (A)   VLDL Cholesterol 35   LDL Cholesterol  119 (A)   LDL/HDL Ratio 3.11   (A) Abnormal value            TSH    TSH 3/10/22 9/20/22   TSH 1.900 2.410           A1C Last 3 Results    HGBA1C Last 3 Results " surgical history:  Bariatric surgery 2017  Bilateral axillary sweat glands excised  Bilateral carpal tunnel repair  Bilateral de Quervain tenosynovitis repair  Bilateral index finger trigger finger repair  Extracapsular cataract extractions with intra-ocular lenses OU  Cholecystectomy  Tonsillectomy  Bilateral bunionectomy  Allergies: She has no known drug allergy  Habits: She smoked per history of present illness.  She rarely drinks  Family history 2 paternal aunts had pancreas cancer  Social: She lives here in [place].  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  She is alert and oriented answering questions appropriately.  She has no palpable supraclavicular adenopathy  On cardiovascular exam she has a regular rhythm and rate without murmur rub or gallop  Her lungs are clear to auscultation  On examination of her thorax she has trocar scars and 1 thoracostomy tube scar on the left side.  These are all healed  Impression: I have reviewed Mrs. Cheema preoperative imaging.  I can not tell from the available information whether this tumor was actually stuck to the visceral pleura.  I have recommended to Mrs. Canela that if it indeed was, she should undergo 10 fractions of postoperative adjuvant radiotherapy.  I have have a call in to Dr. Hammond who anticipate will call me later today.  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  Plan: I will contact her at home once I have discussed her case with Dr. Sanchez.  I certainly appreciate participating in this delightful woman's care.   3/10/22 6/22/22 9/20/22   Hemoglobin A1C 8.7 9.4 7.20 (A)   (A) Abnormal value            Microalbumin    Microalbumin 3/10/22   Microalbumin, Urine 1.5           Lab Results   Component Value Date    WWKY67AJ 34.0 02/24/2021    [id number] 818 08/05/2020               Assessment and Plan    Diagnoses and all orders for this visit:    1. Controlled type 2 diabetes mellitus without complication, without long-term current use of insulin (HCC) (Primary)-A1c looks much better with the metformin as well as dietary changes.  She will continue working on healthy diet and exercise.  We can recheck A1c in 3 months.  She has an appointment for her physical in November, so we will probably put a lab order in then for December.  We did discuss statins today since she is diabetic but she is reluctant and would like to postpone till next visit    2. History of pulmonary embolism-INR is in range, continue current dose and recheck in 1 month  -     POC INR    3. Need for influenza vaccination  -     Fluzone High-Dose 65+yrs ([id number])    4. Elevated liver enzymes-we can recheck her LFTs at her physical  we will recheck next visit    5.  Petechiae-she does have some petechiae on her lower extremities.  Most recent CBC shows normal platelets.  We will plan to recheck a CBC next visit and make sure normal    Follow Up   No follow-ups on file.  Patient was given instructions and counseling regarding her condition or for health maintenance advice. Please see specific information pulled into the AVS if appropriate.

## 2024-07-02 ENCOUNTER — TELEPHONE (OUTPATIENT)
Dept: INTERNAL MEDICINE | Facility: CLINIC | Age: 74
End: 2024-07-02
Payer: COMMERCIAL

## 2024-07-08 ENCOUNTER — TELEPHONE (OUTPATIENT)
Dept: INTERNAL MEDICINE | Facility: CLINIC | Age: 74
End: 2024-07-08
Payer: COMMERCIAL

## 2024-07-08 ENCOUNTER — CLINICAL SUPPORT (OUTPATIENT)
Dept: INTERNAL MEDICINE | Facility: CLINIC | Age: 74
End: 2024-07-08
Payer: COMMERCIAL

## 2024-07-08 DIAGNOSIS — Z86.711 HISTORY OF PULMONARY EMBOLISM: Primary | ICD-10-CM

## 2024-07-08 LAB — INR PPP: 3.8 (ref 0.9–1.1)

## 2024-07-08 PROCEDURE — 85610 PROTHROMBIN TIME: CPT | Performed by: INTERNAL MEDICINE

## 2024-07-08 PROCEDURE — 36416 COLLJ CAPILLARY BLOOD SPEC: CPT | Performed by: INTERNAL MEDICINE

## 2024-07-08 NOTE — TELEPHONE ENCOUNTER
Patient present for nurse visit to check INR.  Reading of 3.8 was obtained.  She take 5 mg 3 times a week and 7.5 mg 4 times a week.  Today dose will be 5 mg.

## 2024-07-08 NOTE — TELEPHONE ENCOUNTER
HUB to relay:  Let's have her go down to 7.5mg 3 days a week, and the 5mg 4 days a week and recheck in 2 weeks

## 2024-07-08 NOTE — TELEPHONE ENCOUNTER
Name: Bailey Armstrong      Relationship: Self      Best Callback Number: [phone]       HUB PROVIDED THE RELAY MESSAGE FROM THE OFFICE      PATIENT: VOICED UNDERSTANDING AND HAS NO FURTHER QUESTIONS AT THIS TIME

## 2024-07-23 ENCOUNTER — CLINICAL SUPPORT (OUTPATIENT)
Dept: INTERNAL MEDICINE | Facility: CLINIC | Age: 74
End: 2024-07-23
Payer: COMMERCIAL

## 2024-07-23 LAB — INR PPP: 2.8 (ref 0.9–1.1)

## 2024-07-23 PROCEDURE — 36416 COLLJ CAPILLARY BLOOD SPEC: CPT | Performed by: INTERNAL MEDICINE

## 2024-07-23 PROCEDURE — 85610 PROTHROMBIN TIME: CPT | Performed by: INTERNAL MEDICINE

## 2024-07-24 DIAGNOSIS — K21.9 GASTROESOPHAGEAL REFLUX DISEASE, UNSPECIFIED WHETHER ESOPHAGITIS PRESENT: ICD-10-CM

## 2024-07-24 RX ORDER — OMEPRAZOLE 20 MG/1
20 CAPSULE, DELAYED RELEASE ORAL DAILY
Qty: 90 CAPSULE | Refills: 0 | Status: SHIPPED | OUTPATIENT
Start: 2024-07-24

## 2024-07-25 ENCOUNTER — TELEPHONE (OUTPATIENT)
Dept: INTERNAL MEDICINE | Facility: CLINIC | Age: 74
End: 2024-07-25
Payer: COMMERCIAL

## 2024-07-25 NOTE — TELEPHONE ENCOUNTER
----- Message from Miriam Buenrostro sent at 7/25/2024  8:56 AM EDT -----  Regarding: INR  Can you let pt know, her INR looked good on 7/23, continue same dose, recheck in 4 weeks

## 2024-08-14 ENCOUNTER — LAB (OUTPATIENT)
Dept: INTERNAL MEDICINE | Facility: CLINIC | Age: 74
End: 2024-08-14
Payer: COMMERCIAL

## 2024-08-14 DIAGNOSIS — E78.00 PURE HYPERCHOLESTEROLEMIA: Chronic | ICD-10-CM

## 2024-08-14 DIAGNOSIS — E55.9 VITAMIN D DEFICIENCY: ICD-10-CM

## 2024-08-14 DIAGNOSIS — E11.9 CONTROLLED TYPE 2 DIABETES MELLITUS WITHOUT COMPLICATION, WITHOUT LONG-TERM CURRENT USE OF INSULIN: Chronic | ICD-10-CM

## 2024-08-14 DIAGNOSIS — Z00.00 ROUTINE GENERAL MEDICAL EXAMINATION AT A HEALTH CARE FACILITY: ICD-10-CM

## 2024-08-14 LAB
DEPRECATED RDW RBC AUTO: 43.7 FL (ref 37–54)
ERYTHROCYTE [DISTWIDTH] IN BLOOD BY AUTOMATED COUNT: 13.3 % (ref 12.3–15.4)
HBA1C MFR BLD: 7.1 % (ref 4.8–5.6)
HCT VFR BLD AUTO: 37.6 % (ref 34–46.6)
HGB BLD-MCNC: 12.4 G/DL (ref 12–15.9)
MCH RBC QN AUTO: 29.8 PG (ref 26.6–33)
MCHC RBC AUTO-ENTMCNC: 33 G/DL (ref 31.5–35.7)
MCV RBC AUTO: 90.4 FL (ref 79–97)
PLATELET # BLD AUTO: 281 10*3/MM3 (ref 140–450)
PMV BLD AUTO: 10.6 FL (ref 6–12)
RBC # BLD AUTO: 4.16 10*6/MM3 (ref 3.77–5.28)
WBC NRBC COR # BLD AUTO: 6.41 10*3/MM3 (ref 3.4–10.8)

## 2024-08-14 PROCEDURE — 80050 GENERAL HEALTH PANEL: CPT | Performed by: INTERNAL MEDICINE

## 2024-08-14 PROCEDURE — 82306 VITAMIN D 25 HYDROXY: CPT | Performed by: INTERNAL MEDICINE

## 2024-08-14 PROCEDURE — 83036 HEMOGLOBIN GLYCOSYLATED A1C: CPT | Performed by: INTERNAL MEDICINE

## 2024-08-14 PROCEDURE — 36415 COLL VENOUS BLD VENIPUNCTURE: CPT | Performed by: INTERNAL MEDICINE

## 2024-08-14 PROCEDURE — 80061 LIPID PANEL: CPT | Performed by: INTERNAL MEDICINE

## 2024-08-15 ENCOUNTER — OFFICE VISIT (OUTPATIENT)
Dept: INTERNAL MEDICINE | Facility: CLINIC | Age: 74
End: 2024-08-15
Payer: COMMERCIAL

## 2024-08-15 ENCOUNTER — ANTICOAGULATION VISIT (OUTPATIENT)
Dept: INTERNAL MEDICINE | Facility: CLINIC | Age: 74
End: 2024-08-15

## 2024-08-15 VITALS
SYSTOLIC BLOOD PRESSURE: 118 MMHG | TEMPERATURE: 98 F | OXYGEN SATURATION: 98 % | DIASTOLIC BLOOD PRESSURE: 72 MMHG | RESPIRATION RATE: 18 BRPM | HEIGHT: 62 IN | BODY MASS INDEX: 31.73 KG/M2 | HEART RATE: 76 BPM | WEIGHT: 172.4 LBS

## 2024-08-15 DIAGNOSIS — E11.9 CONTROLLED TYPE 2 DIABETES MELLITUS WITHOUT COMPLICATION, WITHOUT LONG-TERM CURRENT USE OF INSULIN: Chronic | ICD-10-CM

## 2024-08-15 DIAGNOSIS — E55.9 VITAMIN D DEFICIENCY: ICD-10-CM

## 2024-08-15 DIAGNOSIS — Z86.711 HISTORY OF PULMONARY EMBOLISM: Primary | ICD-10-CM

## 2024-08-15 DIAGNOSIS — Z00.00 ROUTINE GENERAL MEDICAL EXAMINATION AT A HEALTH CARE FACILITY: Primary | ICD-10-CM

## 2024-08-15 DIAGNOSIS — Z86.711 HISTORY OF PULMONARY EMBOLISM: Chronic | ICD-10-CM

## 2024-08-15 DIAGNOSIS — I27.29 PULMONARY HYPERTENSION DUE TO THROMBOEMBOLISM: Chronic | ICD-10-CM

## 2024-08-15 DIAGNOSIS — I26.99 PULMONARY HYPERTENSION DUE TO THROMBOEMBOLISM: Chronic | ICD-10-CM

## 2024-08-15 DIAGNOSIS — K76.0 FATTY LIVER: ICD-10-CM

## 2024-08-15 DIAGNOSIS — E78.00 PURE HYPERCHOLESTEROLEMIA: Chronic | ICD-10-CM

## 2024-08-15 DIAGNOSIS — Z13.820 SCREENING FOR OSTEOPOROSIS: ICD-10-CM

## 2024-08-15 LAB
25(OH)D3 SERPL-MCNC: 34.7 NG/ML (ref 30–100)
ALBUMIN SERPL-MCNC: 4.1 G/DL (ref 3.5–5.2)
ALBUMIN/GLOB SERPL: 1.2 G/DL
ALP SERPL-CCNC: 87 U/L (ref 39–117)
ALT SERPL W P-5'-P-CCNC: 33 U/L (ref 1–33)
ANION GAP SERPL CALCULATED.3IONS-SCNC: 11 MMOL/L (ref 5–15)
AST SERPL-CCNC: 48 U/L (ref 1–32)
BILIRUB BLD-MCNC: NEGATIVE MG/DL
BILIRUB SERPL-MCNC: 1 MG/DL (ref 0–1.2)
BUN SERPL-MCNC: 14 MG/DL (ref 8–23)
BUN/CREAT SERPL: 19.2 (ref 7–25)
CALCIUM SPEC-SCNC: 9.8 MG/DL (ref 8.6–10.5)
CHLORIDE SERPL-SCNC: 101 MMOL/L (ref 98–107)
CHOLEST SERPL-MCNC: 217 MG/DL (ref 0–200)
CLARITY, POC: CLEAR
CO2 SERPL-SCNC: 26 MMOL/L (ref 22–29)
COLOR UR: YELLOW
CREAT SERPL-MCNC: 0.73 MG/DL (ref 0.57–1)
EGFRCR SERPLBLD CKD-EPI 2021: 85.9 ML/MIN/1.73
EXPIRATION DATE: ABNORMAL
GLOBULIN UR ELPH-MCNC: 3.4 GM/DL
GLUCOSE SERPL-MCNC: 116 MG/DL (ref 65–99)
GLUCOSE UR STRIP-MCNC: NEGATIVE MG/DL
HDLC SERPL-MCNC: 37 MG/DL (ref 40–60)
INR PPP: 2.7 (ref 0.9–1.1)
KETONES UR QL: NEGATIVE
LDLC SERPL CALC-MCNC: 134 MG/DL (ref 0–100)
LDLC/HDLC SERPL: 3.47 {RATIO}
LEUKOCYTE EST, POC: NEGATIVE
Lab: ABNORMAL
NITRITE UR-MCNC: NEGATIVE MG/ML
PH UR: 7 [PH] (ref 5–8)
POTASSIUM SERPL-SCNC: 4.4 MMOL/L (ref 3.5–5.2)
PROT SERPL-MCNC: 7.5 G/DL (ref 6–8.5)
PROT UR STRIP-MCNC: ABNORMAL MG/DL
RBC # UR STRIP: NEGATIVE /UL
SODIUM SERPL-SCNC: 138 MMOL/L (ref 136–145)
SP GR UR: 1.01 (ref 1–1.03)
TRIGL SERPL-MCNC: 258 MG/DL (ref 0–150)
TSH SERPL DL<=0.05 MIU/L-ACNC: 1.64 UIU/ML (ref 0.27–4.2)
UROBILINOGEN UR QL: NORMAL
VLDLC SERPL-MCNC: 46 MG/DL (ref 5–40)

## 2024-08-15 PROCEDURE — 36416 COLLJ CAPILLARY BLOOD SPEC: CPT | Performed by: INTERNAL MEDICINE

## 2024-08-15 PROCEDURE — 82570 ASSAY OF URINE CREATININE: CPT | Performed by: INTERNAL MEDICINE

## 2024-08-15 PROCEDURE — 82043 UR ALBUMIN QUANTITATIVE: CPT | Performed by: INTERNAL MEDICINE

## 2024-08-15 PROCEDURE — 85610 PROTHROMBIN TIME: CPT | Performed by: INTERNAL MEDICINE

## 2024-08-15 PROCEDURE — 81003 URINALYSIS AUTO W/O SCOPE: CPT | Performed by: INTERNAL MEDICINE

## 2024-08-15 PROCEDURE — 99397 PER PM REEVAL EST PAT 65+ YR: CPT | Performed by: INTERNAL MEDICINE

## 2024-08-16 LAB
ALBUMIN UR-MCNC: <1.2 MG/DL
CREAT UR-MCNC: 120 MG/DL
MICROALBUMIN/CREAT UR: NORMAL MG/G{CREAT}

## 2024-08-26 RX ORDER — WARFARIN SODIUM 7.5 MG/1
TABLET ORAL
Qty: 90 TABLET | Refills: 1 | Status: SHIPPED | OUTPATIENT
Start: 2024-08-26

## 2024-08-26 NOTE — TELEPHONE ENCOUNTER
Rx Refill Note  Requested Prescriptions     Pending Prescriptions Disp Refills    warfarin (COUMADIN) 7.5 MG tablet [Pharmacy Med Name: WARFARIN SOD 7.5MG TABLETS (YELLOW)] 90 tablet 0     Sig: TAKE 1 TABLET BY MOUTH EVERY DAY AS DIRECTED      Last office visit with prescribing clinician: 8/15/2024     Next office visit with prescribing clinician: 11/7/2024       Patsy Chicas  08/26/24, 17:22 EDT

## 2024-09-19 ENCOUNTER — HOSPITAL ENCOUNTER (OUTPATIENT)
Dept: BONE DENSITY | Facility: HOSPITAL | Age: 74
Discharge: HOME | End: 2024-09-19
Admitting: INTERNAL MEDICINE
Payer: COMMERCIAL

## 2024-09-19 ENCOUNTER — ANTICOAGULATION VISIT (OUTPATIENT)
Dept: INTERNAL MEDICINE | Facility: CLINIC | Age: 74
End: 2024-09-19
Payer: COMMERCIAL

## 2024-09-19 DIAGNOSIS — Z13.820 SCREENING FOR OSTEOPOROSIS: ICD-10-CM

## 2024-09-19 LAB — INR PPP: 2.8 (ref 0.9–1.1)

## 2024-09-19 PROCEDURE — 36416 COLLJ CAPILLARY BLOOD SPEC: CPT | Performed by: INTERNAL MEDICINE

## 2024-09-19 PROCEDURE — 77080 DXA BONE DENSITY AXIAL: CPT

## 2024-09-19 PROCEDURE — 85610 PROTHROMBIN TIME: CPT | Performed by: INTERNAL MEDICINE

## 2024-09-23 ENCOUNTER — TELEPHONE (OUTPATIENT)
Dept: INTERNAL MEDICINE | Facility: CLINIC | Age: 74
End: 2024-09-23
Payer: COMMERCIAL

## 2024-10-09 ENCOUNTER — OFFICE VISIT (OUTPATIENT)
Dept: INTERNAL MEDICINE | Facility: CLINIC | Age: 74
End: 2024-10-09
Payer: COMMERCIAL

## 2024-10-09 VITALS
TEMPERATURE: 96.9 F | SYSTOLIC BLOOD PRESSURE: 122 MMHG | BODY MASS INDEX: 31.53 KG/M2 | HEIGHT: 62 IN | HEART RATE: 102 BPM | OXYGEN SATURATION: 98 % | DIASTOLIC BLOOD PRESSURE: 78 MMHG

## 2024-10-09 DIAGNOSIS — M19.072 ARTHRITIS OF FIRST METATARSOPHALANGEAL (MTP) JOINT OF LEFT FOOT: Primary | ICD-10-CM

## 2024-10-09 PROCEDURE — 99213 OFFICE O/P EST LOW 20 MIN: CPT | Performed by: INTERNAL MEDICINE

## 2024-10-09 NOTE — PROGRESS NOTES
Chief Complaint  Toe Pain (Left great toe pain 3 days.  Walked 2 miles more than usual/)    Subjective          Bailey Armstrong presents to [facility]    History of Present Illness  The patient presents for evaluation of left great toe pain.    She has been experiencing pain in her left big toe for the past 3 days, which she attributes to arthritis but wanted to be checked to make sure it was not an infection. The pain intensified after a longer than usual walk and a session of raking leaves. She reports that the pain significantly increased after raking leaves.  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-had x-ray last year  She states she did see  years ago who stated surgery could be done but patient wants to avoid since she is on chronic warfarin             Current Outpatient Medications:     clonazePAM (KlonoPIN) 0.5 MG tablet, Take 1 tablet by mouth Every Night., Disp: , Rfl:     Cyanocobalamin (VITAMIN B-12 PO), Take 1 tablet by mouth Daily., Disp: , Rfl:     hydroquinone 4 % cream, 1 Application Daily As Needed., Disp: , Rfl:     IRON PO, Take  by mouth Daily., Disp: , Rfl:     metFORMIN ER (GLUCOPHAGE-XR) 500 MG 24 hr tablet, 1 qd with food, Disp: 90 tablet, Rfl: 1    OLANZapine (zyPREXA) 2.5 MG tablet, Take 0.625 mg by mouth Every Night., Disp: , Rfl:     omeprazole (priLOSEC) 20 MG capsule, TAKE 1 CAPSULE BY MOUTH DAILY, Disp: 90 capsule, Rfl: 0    PARoxetine (PAXIL) 20 MG tablet, Take 1 tablet by mouth Daily., Disp: , Rfl:     triamcinolone (KENALOG) 0.1 % ointment, 1 Application Daily As Needed., Disp: , Rfl:     Vitamin D, Cholecalciferol, 50 MCG (2000 UT) capsule, Take 1 capsule by mouth Daily., Disp: , Rfl:     warfarin (COUMADIN) 5 MG tablet, Take 1 tablet by mouth Daily., Disp: 90 tablet, Rfl: 1    warfarin (COUMADIN) 7.5 MG tablet, TAKE 1 TABLET BY MOUTH EVERY DAY AS DIRECTED, Disp: 90  "tablet, Rfl: 1    glucose blood test strip, Use once daily, Disp: 100 each, Rfl: 3    glucose monitor monitoring kit, 1 each As Needed (once daily)., Disp: 1 each, Rfl: 0   The following portions of the patient's history were reviewed and updated as appropriate: allergies, current medications, past family history, past medical history, past social history, past surgical history and problem list.     Objective   Vital Signs:   /78 (BP Location: Left arm, Patient Position: Sitting, Cuff Size: Adult)   Pulse 102   Temp 96.9 °F (36.1 °C)   Ht 157.5 cm (62\")   SpO2 98%   BMI 31.53 kg/m²       Physical exam  Constitutional: oriented to person, place, and time.  well-developed and well-nourished. No distress.   HENT:   Head: Normocephalic and atraumatic.   Eyes: Conjunctivae and EOM are normal.   Neurological:  alert and oriented to person, place, and time.   Skin: Skin is warm and dry. not diaphoretic.   MS: L gt toe - tender over MTP joint with some edema. No erythema.   Psychiatric:  normal mood and affect. behavior is normal. Judgment and thought content normal.      Physical Exam     Physical Exam         Results  Imaging  X-ray from last year showed severe arthritis in the MTP joint.       Result Review :                   Assessment and Plan          Assessment & Plan  1.  Left MTP pain-no signs of cellulitis or gout today.  Does have known severe arthritis in this joint.  Can use Tylenol and over-the-counter Voltaren gel as needed and a few with a stiff sole.      She wants to get her flu and COVID shots later.  Did recommend that she get the RSV vaccine as well  INR due next week and she will return for this             Follow Up   No follow-ups on file.  Patient was given instructions and counseling regarding her condition or for health maintenance advice. Please see specific information pulled into the AVS if appropriate.     Patient or patient representative verbalized consent for the use of Ambient " Listening during the visit with  Miriam Buenrostro MD for chart documentation. 10/9/2024  14:08 EDT

## 2024-10-21 DIAGNOSIS — K21.9 GASTROESOPHAGEAL REFLUX DISEASE, UNSPECIFIED WHETHER ESOPHAGITIS PRESENT: ICD-10-CM

## 2024-10-21 NOTE — TELEPHONE ENCOUNTER
Rx Refill Note  Requested Prescriptions     Pending Prescriptions Disp Refills    omeprazole (priLOSEC) 20 MG capsule [Pharmacy Med Name: OMEPRAZOLE 20MG CAPSULES] 90 capsule 0     Sig: TAKE 1 CAPSULE BY MOUTH DAILY      Last office visit with prescribing clinician: 10/9/2024   Last telemedicine visit with prescribing clinician: Visit date not found   Next office visit with prescribing clinician: 11/15/2024       Joy Fraser MA  10/21/24, 16:45 EDT

## 2024-10-23 ENCOUNTER — TELEPHONE (OUTPATIENT)
Dept: INTERNAL MEDICINE | Facility: CLINIC | Age: 74
End: 2024-10-23
Payer: COMMERCIAL

## 2024-10-24 ENCOUNTER — ANTICOAGULATION VISIT (OUTPATIENT)
Dept: INTERNAL MEDICINE | Facility: CLINIC | Age: 74
End: 2024-10-24
Payer: COMMERCIAL

## 2024-10-24 DIAGNOSIS — Z79.01 CHRONIC ANTICOAGULATION: Primary | Chronic | ICD-10-CM

## 2024-10-24 LAB — INR PPP: 2.3 (ref 0.9–1.1)

## 2024-10-24 PROCEDURE — 36416 COLLJ CAPILLARY BLOOD SPEC: CPT | Performed by: INTERNAL MEDICINE

## 2024-10-24 PROCEDURE — 85610 PROTHROMBIN TIME: CPT | Performed by: INTERNAL MEDICINE

## 2024-10-27 ENCOUNTER — TELEPHONE (OUTPATIENT)
Dept: INTERNAL MEDICINE | Facility: CLINIC | Age: 74
End: 2024-10-27
Payer: COMMERCIAL

## 2024-11-05 ENCOUNTER — TELEPHONE (OUTPATIENT)
Dept: INTERNAL MEDICINE | Facility: CLINIC | Age: 74
End: 2024-11-05

## 2024-11-05 ENCOUNTER — ANTICOAGULATION VISIT (OUTPATIENT)
Dept: INTERNAL MEDICINE | Facility: CLINIC | Age: 74
End: 2024-11-05
Payer: COMMERCIAL

## 2024-11-05 LAB — INR PPP: 3.5 (ref 0.9–1.1)

## 2024-11-05 PROCEDURE — 36416 COLLJ CAPILLARY BLOOD SPEC: CPT | Performed by: INTERNAL MEDICINE

## 2024-11-05 PROCEDURE — 85610 PROTHROMBIN TIME: CPT | Performed by: INTERNAL MEDICINE

## 2024-11-15 ENCOUNTER — OFFICE VISIT (OUTPATIENT)
Dept: INTERNAL MEDICINE | Facility: CLINIC | Age: 74
End: 2024-11-15
Payer: COMMERCIAL

## 2024-11-15 VITALS
RESPIRATION RATE: 12 BRPM | OXYGEN SATURATION: 95 % | SYSTOLIC BLOOD PRESSURE: 114 MMHG | HEIGHT: 62 IN | TEMPERATURE: 98 F | HEART RATE: 93 BPM | WEIGHT: 178 LBS | DIASTOLIC BLOOD PRESSURE: 80 MMHG | BODY MASS INDEX: 32.76 KG/M2

## 2024-11-15 DIAGNOSIS — E11.65 UNCONTROLLED TYPE 2 DIABETES MELLITUS WITH HYPERGLYCEMIA, WITHOUT LONG-TERM CURRENT USE OF INSULIN: ICD-10-CM

## 2024-11-15 DIAGNOSIS — Z79.01 CHRONIC ANTICOAGULATION: Primary | Chronic | ICD-10-CM

## 2024-11-15 DIAGNOSIS — Z86.711 HISTORY OF PULMONARY EMBOLISM: Chronic | ICD-10-CM

## 2024-11-15 LAB
EXPIRATION DATE: ABNORMAL
HBA1C MFR BLD: 8 % (ref 4.5–5.7)
INR PPP: 2.6 (ref 0.9–1.1)
Lab: ABNORMAL

## 2024-11-15 RX ORDER — WARFARIN SODIUM 5 MG/1
5 TABLET ORAL DAILY
Qty: 90 TABLET | Refills: 1 | Status: SHIPPED | OUTPATIENT
Start: 2024-11-15

## 2024-11-15 RX ORDER — METFORMIN HYDROCHLORIDE 500 MG/1
TABLET, EXTENDED RELEASE ORAL
Qty: 90 TABLET | Refills: 1 | Status: SHIPPED | OUTPATIENT
Start: 2024-11-15

## 2024-11-15 NOTE — PROGRESS NOTES
Chief Complaint  Diabetes (3 month recheck )    Subjective          Bailey Armstrong presents to [facility]    History of Present Illness       DMT2 - she has not been eating healthy. Last A1c was 7.1. she is going to gym 3x/week - does balance class, aerobics, strength.  She has a lot of stress, w/ her sister w/ dementia and has had 2 friends pass away,  w/ health problems    History of pulmonary embolism-patient on warfarin and INR today is 2.6        Current Outpatient Medications:     clonazePAM (KlonoPIN) 0.5 MG tablet, Take 1 tablet by mouth Every Night., Disp: , Rfl:     Cyanocobalamin (VITAMIN B-12 PO), Take 1 tablet by mouth Daily., Disp: , Rfl:     glucose blood test strip, Use once daily, Disp: 100 each, Rfl: 3    glucose monitor monitoring kit, 1 each As Needed (once daily)., Disp: 1 each, Rfl: 0    hydroquinone 4 % cream, 1 Application Daily As Needed., Disp: , Rfl:     IRON PO, Take  by mouth Daily., Disp: , Rfl:     metFORMIN ER (GLUCOPHAGE-XR) 500 MG 24 hr tablet, 1 qd with food, Disp: 90 tablet, Rfl: 1    OLANZapine (zyPREXA) 2.5 MG tablet, Take 0.625 mg by mouth Every Night., Disp: , Rfl:     omeprazole (priLOSEC) 20 MG capsule, TAKE 1 CAPSULE BY MOUTH DAILY, Disp: 90 capsule, Rfl: 0    PARoxetine (PAXIL) 20 MG tablet, Take 1 tablet by mouth Daily., Disp: , Rfl:     triamcinolone (KENALOG) 0.1 % ointment, 1 Application Daily As Needed., Disp: , Rfl:     Vitamin D, Cholecalciferol, 50 MCG (2000 UT) capsule, Take 1 capsule by mouth Daily., Disp: , Rfl:     warfarin (COUMADIN) 5 MG tablet, Take 1 tablet by mouth Daily., Disp: 90 tablet, Rfl: 1    warfarin (COUMADIN) 7.5 MG tablet, TAKE 1 TABLET BY MOUTH EVERY DAY AS DIRECTED, Disp: 90 tablet, Rfl: 1   The following portions of the patient's history were reviewed and updated as appropriate: allergies, current medications, past family history, past medical history, past social history, past surgical history and  "problem list.     Objective   Vital Signs:   /80   Pulse 93   Temp 98 °F (36.7 °C) (Infrared)   Resp 12   Ht 157.5 cm (62.01\")   Wt 80.7 kg (178 lb)   SpO2 95%   BMI 32.55 kg/m²       Physical exam  Constitutional: oriented to person, place, and time.  well-developed and well-nourished. No distress.   HENT:   Head: Normocephalic and atraumatic.   Eyes: Conjunctivae and EOM are normal.   Cardiovascular: Normal rate, regular rhythm and normal heart sounds.  Exam reveals no gallop and no friction rub.    No murmur heard.  Pulmonary/Chest: Effort normal and breath sounds normal. No respiratory distress.   no wheezes.   Neurological:  alert and oriented to person, place, and time.   Skin: Skin is warm and dry. not diaphoretic.   Psychiatric:  normal mood and affect. behavior is normal. Judgment and thought content normal.      Physical Exam     Physical Exam         Results         Result Review :                   Assessment and Plan      Diagnoses and all orders for this visit:    1. Uncontrolled type 2 diabetes mellitus without complication, without long-term current use of insulin (Primary)-discussed adding second medication but patient declines.  She feels like she can focus on diet and improve A1c.  We will recheck in 3 months  -     POC Glycosylated Hemoglobin (Hb A1C)    2. Chronic anticoagulation-INR is good today.  Continue current dose of warfarin and recheck in 4 weeks  -     POCT INR        Assessment & Plan           Follow Up   No follow-ups on file.  Patient was given instructions and counseling regarding her condition or for health maintenance advice. Please see specific information pulled into the AVS if appropriate.         "

## 2025-01-02 ENCOUNTER — ANTICOAGULATION VISIT (OUTPATIENT)
Dept: INTERNAL MEDICINE | Facility: CLINIC | Age: 75
End: 2025-01-02
Payer: COMMERCIAL

## 2025-01-02 ENCOUNTER — TELEPHONE (OUTPATIENT)
Dept: INTERNAL MEDICINE | Facility: CLINIC | Age: 75
End: 2025-01-02
Payer: COMMERCIAL

## 2025-01-02 DIAGNOSIS — Z79.01 CHRONIC ANTICOAGULATION: Primary | ICD-10-CM

## 2025-01-02 LAB — INR PPP: 2.2 (ref 0.9–1.1)

## 2025-01-02 PROCEDURE — 36416 COLLJ CAPILLARY BLOOD SPEC: CPT | Performed by: INTERNAL MEDICINE

## 2025-01-02 PROCEDURE — 85610 PROTHROMBIN TIME: CPT | Performed by: INTERNAL MEDICINE

## 2025-01-02 NOTE — TELEPHONE ENCOUNTER
Patient has been notified but she is only be doing the 7.5 mg x 3 days and 5 mg x 4 days.  Do you want her to do 7.5 mg x 4 days and 5 mg x 3 days.

## 2025-01-02 NOTE — TELEPHONE ENCOUNTER
----- Message from Miriam Buenrostro sent at 1/2/2025  3:39 PM EST -----  Can you let her know, INR is a little bit low for her at 2.2 so if she can increase her warfarin to 7.5 mg 5 days a week instead of 4 days a week, and do the 5 mg for 2 days a week and recheck in 2 weeks

## 2025-01-16 ENCOUNTER — TELEPHONE (OUTPATIENT)
Dept: INTERNAL MEDICINE | Facility: CLINIC | Age: 75
End: 2025-01-16

## 2025-01-16 ENCOUNTER — ANTICOAGULATION VISIT (OUTPATIENT)
Dept: INTERNAL MEDICINE | Facility: CLINIC | Age: 75
End: 2025-01-16
Payer: COMMERCIAL

## 2025-01-16 DIAGNOSIS — Z79.01 CHRONIC ANTICOAGULATION: Primary | ICD-10-CM

## 2025-01-16 LAB — INR PPP: 3.2

## 2025-01-16 PROCEDURE — 85610 PROTHROMBIN TIME: CPT | Performed by: INTERNAL MEDICINE

## 2025-01-16 PROCEDURE — 36416 COLLJ CAPILLARY BLOOD SPEC: CPT | Performed by: INTERNAL MEDICINE

## 2025-01-16 NOTE — TELEPHONE ENCOUNTER
----- Message from Miriam Buenrostro sent at 1/16/2025  2:56 PM EST -----  INR looks good - continue current dose of warfarin and recheck in 4 weeks  ----- Message -----  From: Patsy Chicas  Sent: 1/16/2025   2:46 PM EST  To: Miriam Buenrostro MD   Diet as tolerated

## 2025-01-16 NOTE — TELEPHONE ENCOUNTER
HUB to relay:  INR looks good - continue current dose of warfarin and recheck in 4 weeks  ----- Message -----  From: Patsy Chicas  Sent: 1/16/2025   2:46 PM EST  To: Miriam Buenrostro MD

## 2025-01-17 NOTE — TELEPHONE ENCOUNTER
Name: Bailey Armstrong    Relationship: Self    Best Callback Number: [phone]     HUB PROVIDED THE RELAY MESSAGE FROM THE OFFICE   PATIENT VOICED UNDERSTANDING AND HAS NO FURTHER QUESTIONS AT THIS TIME

## 2025-01-27 DIAGNOSIS — K21.9 GASTROESOPHAGEAL REFLUX DISEASE, UNSPECIFIED WHETHER ESOPHAGITIS PRESENT: ICD-10-CM

## 2025-01-28 DIAGNOSIS — K21.9 GASTROESOPHAGEAL REFLUX DISEASE, UNSPECIFIED WHETHER ESOPHAGITIS PRESENT: ICD-10-CM

## 2025-01-28 NOTE — TELEPHONE ENCOUNTER
Rx Refill Note  Requested Prescriptions     Pending Prescriptions Disp Refills    omeprazole (priLOSEC) 20 MG capsule [Pharmacy Med Name: OMEPRAZOLE 20MG CAPSULES] 30 capsule 0     Sig: TAKE 1 CAPSULE BY MOUTH DAILY      Last office visit with prescribing clinician: 11/15/2024     Next office visit with prescribing clinician: 2/13/2025       Patsy Chicas  01/28/25, 12:22 EST

## 2025-02-19 ENCOUNTER — LAB (OUTPATIENT)
Dept: INTERNAL MEDICINE | Facility: CLINIC | Age: 75
End: 2025-02-19
Payer: COMMERCIAL

## 2025-02-19 DIAGNOSIS — E11.9 CONTROLLED TYPE 2 DIABETES MELLITUS WITHOUT COMPLICATION, WITHOUT LONG-TERM CURRENT USE OF INSULIN: Chronic | ICD-10-CM

## 2025-02-19 DIAGNOSIS — E78.00 PURE HYPERCHOLESTEROLEMIA: Chronic | ICD-10-CM

## 2025-02-19 LAB
ALBUMIN SERPL-MCNC: 3.9 G/DL (ref 3.5–5.2)
ALBUMIN/GLOB SERPL: 1 G/DL
ALP SERPL-CCNC: 98 U/L (ref 39–117)
ALT SERPL W P-5'-P-CCNC: 74 U/L (ref 1–33)
ANION GAP SERPL CALCULATED.3IONS-SCNC: 10.2 MMOL/L (ref 5–15)
AST SERPL-CCNC: 104 U/L (ref 1–32)
BILIRUB SERPL-MCNC: 1.3 MG/DL (ref 0–1.2)
BUN SERPL-MCNC: 12 MG/DL (ref 8–23)
BUN/CREAT SERPL: 14.8 (ref 7–25)
CALCIUM SPEC-SCNC: 9.4 MG/DL (ref 8.6–10.5)
CHLORIDE SERPL-SCNC: 101 MMOL/L (ref 98–107)
CHOLEST SERPL-MCNC: 214 MG/DL (ref 0–200)
CO2 SERPL-SCNC: 25.8 MMOL/L (ref 22–29)
CREAT SERPL-MCNC: 0.81 MG/DL (ref 0.57–1)
DEPRECATED RDW RBC AUTO: 43.7 FL (ref 37–54)
EGFRCR SERPLBLD CKD-EPI 2021: 75.3 ML/MIN/1.73
ERYTHROCYTE [DISTWIDTH] IN BLOOD BY AUTOMATED COUNT: 13.2 % (ref 12.3–15.4)
GLOBULIN UR ELPH-MCNC: 4 GM/DL
GLUCOSE SERPL-MCNC: 163 MG/DL (ref 65–99)
HBA1C MFR BLD: 8.6 % (ref 4.8–5.6)
HCT VFR BLD AUTO: 39.4 % (ref 34–46.6)
HDLC SERPL-MCNC: 43 MG/DL (ref 40–60)
HGB BLD-MCNC: 12.4 G/DL (ref 12–15.9)
LDLC SERPL CALC-MCNC: 137 MG/DL (ref 0–100)
LDLC/HDLC SERPL: 3.1 {RATIO}
MCH RBC QN AUTO: 28.5 PG (ref 26.6–33)
MCHC RBC AUTO-ENTMCNC: 31.5 G/DL (ref 31.5–35.7)
MCV RBC AUTO: 90.6 FL (ref 79–97)
PLATELET # BLD AUTO: 287 10*3/MM3 (ref 140–450)
PMV BLD AUTO: 11.2 FL (ref 6–12)
POTASSIUM SERPL-SCNC: 4.1 MMOL/L (ref 3.5–5.2)
PROT SERPL-MCNC: 7.9 G/DL (ref 6–8.5)
RBC # BLD AUTO: 4.35 10*6/MM3 (ref 3.77–5.28)
SODIUM SERPL-SCNC: 137 MMOL/L (ref 136–145)
TRIGL SERPL-MCNC: 188 MG/DL (ref 0–150)
TSH SERPL DL<=0.05 MIU/L-ACNC: 2.28 UIU/ML (ref 0.27–4.2)
VLDLC SERPL-MCNC: 34 MG/DL (ref 5–40)
WBC NRBC COR # BLD AUTO: 6.54 10*3/MM3 (ref 3.4–10.8)

## 2025-02-19 PROCEDURE — 80061 LIPID PANEL: CPT | Performed by: INTERNAL MEDICINE

## 2025-02-19 PROCEDURE — 36415 COLL VENOUS BLD VENIPUNCTURE: CPT | Performed by: INTERNAL MEDICINE

## 2025-02-19 PROCEDURE — 83036 HEMOGLOBIN GLYCOSYLATED A1C: CPT | Performed by: INTERNAL MEDICINE

## 2025-02-19 PROCEDURE — 80050 GENERAL HEALTH PANEL: CPT | Performed by: INTERNAL MEDICINE

## 2025-02-19 NOTE — PROGRESS NOTES
Chief Complaint  Diabetes and Pulmonary Embolism (Monday-Wednesday 7.5mg Thursday-Sunday 5mg)    Subjective          Bailey Armstrong presents to [facility]    History of Present Illness    DM 2-patient's A1c yesterday is 8.6.  3 months ago it was 8.0.  She has been very reluctant to be aggressive with medication for her diabetes.  Currently she is only on metformin 500 mg once a day.  She did have trouble with higher doses of metformin (diarrhea) and has declined a second medication for diabetes  Diet has not been good - she has 2 sisters who are not doing well - one has dementia,one has had multiple fractures in her leg  She has been going to gym regularly    Hyperlipidemia-most recent FLP shows LDL of 137 and triglycerides of 188.  She has also declined statin despite having diabetes    History of pulmonary embolism-patient is on warfarin.  She takes 7.5mg 4 days a week and 5 mg 3 days a week.  INR goal is between 2.5 and 3.5 and she is 2.4 today    Fatty liver - lft's are up some. She does not drink ETOH. Her liver US in '23 did show fatty liver.  She does not drink alcohol.  No Tylenol or NSAIDs    She has lump in her l axilla that she noticed several months ago.  She is scheduled for her screening mammogram in March.  She has not felt any lumps in her breast    Current Outpatient Medications:     clonazePAM (KlonoPIN) 0.5 MG tablet, Take 1 tablet by mouth Every Night., Disp: , Rfl:     Cyanocobalamin (VITAMIN B-12 PO), Take 1 tablet by mouth Daily., Disp: , Rfl:     glucose blood test strip, Use once daily, Disp: 100 each, Rfl: 3    glucose monitor monitoring kit, 1 each As Needed (once daily)., Disp: 1 each, Rfl: 0    hydroquinone 4 % cream, 1 Application Daily As Needed., Disp: , Rfl:     IRON PO, Take  by mouth Daily., Disp: , Rfl:     metFORMIN ER (GLUCOPHAGE-XR) 500 MG 24 hr tablet, 1 qd with food, Disp: 90 tablet, Rfl: 1    OLANZapine (zyPREXA) 2.5 MG tablet, Take 0.625 mg  "by mouth Every Night., Disp: , Rfl:     omeprazole (priLOSEC) 20 MG capsule, Take 1 capsule by mouth Daily., Disp: 90 capsule, Rfl: 3    PARoxetine (PAXIL) 20 MG tablet, Take 1 tablet by mouth Daily., Disp: , Rfl:     Vitamin D, Cholecalciferol, 50 MCG (2000 UT) capsule, Take 1 capsule by mouth Daily., Disp: , Rfl:     warfarin (COUMADIN) 5 MG tablet, Take 1 tablet by mouth Daily., Disp: 90 tablet, Rfl: 1    warfarin (COUMADIN) 7.5 MG tablet, TAKE 1 TABLET BY MOUTH EVERY DAY AS DIRECTED, Disp: 90 tablet, Rfl: 1    triamcinolone (KENALOG) 0.1 % ointment, 1 Application Daily As Needed. (Patient not taking: Reported on 2/20/2025), Disp: , Rfl:    The following portions of the patient's history were reviewed and updated as appropriate: allergies, current medications, past family history, past medical history, past social history, past surgical history and problem list.     Objective   Vital Signs:   /82 (BP Location: Left arm, Patient Position: Sitting, Cuff Size: Adult)   Pulse 68   Temp 97.5 °F (36.4 °C) (Infrared)   Resp 16   Ht 157.5 cm (62.01\")   Wt 79.1 kg (174 lb 6.4 oz)   SpO2 99%   BMI 31.89 kg/m²       Physical exam  Constitutional: oriented to person, place, and time.  well-developed and well-nourished. No distress.   HENT:   Head: Normocephalic and atraumatic.   Eyes: Conjunctivae and EOM are normal.   Cardiovascular: Normal rate, regular rhythm and normal heart sounds.  Exam reveals no gallop and no friction rub.    No murmur heard.  Pulmonary/Chest: Effort normal and breath sounds normal. No respiratory distress.   no wheezes.   Neurological:  alert and oriented to person, place, and time.   Skin: Skin is warm and dry. not diaphoretic.   Breast exam: no lumps appreciated in L breast. No definite mass appreciated in l axilla  Psychiatric:  normal mood and affect. behavior is normal. Judgment and thought content normal.      Physical Exam     Results         Result Review :              " "      Lab Results   Component Value Date    WBC 6.54 02/19/2025    HGB 12.4 02/19/2025    HCT 39.4 02/19/2025    MCV 90.6 02/19/2025     02/19/2025     Lab Results   Component Value Date    GLUCOSE 163 (H) 02/19/2025    BUN 12 02/19/2025    CREATININE 0.81 02/19/2025     02/19/2025    K 4.1 02/19/2025     02/19/2025    CALCIUM 9.4 02/19/2025    PROTEINTOT 7.9 02/19/2025    ALBUMIN 3.9 02/19/2025    ALT 74 (H) 02/19/2025     (H) 02/19/2025    ALKPHOS 98 02/19/2025    BILITOT 1.3 (H) 02/19/2025    GLOB 4.0 02/19/2025    AGRATIO 1.0 02/19/2025    BCR 14.8 02/19/2025    ANIONGAP 10.2 02/19/2025    EGFR 75.3 02/19/2025     Lab Results   Component Value Date    CHOL 214 (H) 02/19/2025    TRIG 188 (H) 02/19/2025    HDL 43 02/19/2025     (H) 02/19/2025     Lab Results   Component Value Date    HGBA1C 8.60 (H) 02/19/2025     Lab Results   Component Value Date    TSH 2.280 02/19/2025     No results found for: \"YZCN806\"    INR-2.4     Assessment and Plan      Diagnoses and all orders for this visit:    1. Uncontrolled type 2 diabetes mellitus with hyperglycemia (Primary)- she refuses more medication.  She will continue trying to improve diet.  She will continue exercise we will recheck in 3m.  Did try to discuss long-term effects of uncontrolled diabetes with patient  -     Hemoglobin A1c; Future    2. Pure hypercholesterolemia- she is willing to take lipitor, however, liver labs have resumed so we will need to make sure these are improving before we start Lipitor so we will have her return in 2 weeks to recheck liver labs.  She had filled Lipitor previously but never tried it  -     CBC (No Diff); Future  -     TSH Rfx On Abnormal To Free T4; Future  -     Lipid Panel; Future  -     Comprehensive Metabolic Panel; Future    3. History of pulmonary embolism- inr close to goal, we will leave dose same, recheck in 2weeks  -     POCT INR      4.  Left axillary nodule-no definite nodule appreciated " by me today, but patient has felt it.  She has history of breast cancer.  Discussed we could get an ultrasound of the axilla but she would prefer to get diagnostic mammogram so we will order this  Assessment & Plan           Follow Up   Return in about 3 months (around 5/20/2025) for Next scheduled follow up.  Patient was given instructions and counseling regarding her condition or for health maintenance advice. Please see specific information pulled into the AVS if appropriate.

## 2025-02-20 ENCOUNTER — OFFICE VISIT (OUTPATIENT)
Dept: INTERNAL MEDICINE | Facility: CLINIC | Age: 75
End: 2025-02-20
Payer: COMMERCIAL

## 2025-02-20 ENCOUNTER — ANTICOAGULATION VISIT (OUTPATIENT)
Dept: INTERNAL MEDICINE | Facility: CLINIC | Age: 75
End: 2025-02-20

## 2025-02-20 VITALS
HEIGHT: 62 IN | WEIGHT: 174.4 LBS | OXYGEN SATURATION: 99 % | BODY MASS INDEX: 32.09 KG/M2 | HEART RATE: 68 BPM | TEMPERATURE: 97.5 F | DIASTOLIC BLOOD PRESSURE: 82 MMHG | SYSTOLIC BLOOD PRESSURE: 112 MMHG | RESPIRATION RATE: 16 BRPM

## 2025-02-20 DIAGNOSIS — E11.65 UNCONTROLLED TYPE 2 DIABETES MELLITUS WITH HYPERGLYCEMIA: Primary | ICD-10-CM

## 2025-02-20 DIAGNOSIS — R22.32 AXILLARY MASS, LEFT: ICD-10-CM

## 2025-02-20 DIAGNOSIS — R74.8 ELEVATED LIVER ENZYMES: ICD-10-CM

## 2025-02-20 DIAGNOSIS — Z85.3 HISTORY OF BREAST CANCER: ICD-10-CM

## 2025-02-20 DIAGNOSIS — Z86.711 HISTORY OF PULMONARY EMBOLISM: Chronic | ICD-10-CM

## 2025-02-20 DIAGNOSIS — E78.00 PURE HYPERCHOLESTEROLEMIA: Chronic | ICD-10-CM

## 2025-02-20 LAB — INR PPP: 2.4 (ref 0.9–1.1)

## 2025-02-20 PROCEDURE — 99214 OFFICE O/P EST MOD 30 MIN: CPT | Performed by: INTERNAL MEDICINE

## 2025-02-20 PROCEDURE — 36416 COLLJ CAPILLARY BLOOD SPEC: CPT | Performed by: INTERNAL MEDICINE

## 2025-02-20 PROCEDURE — 85610 PROTHROMBIN TIME: CPT | Performed by: INTERNAL MEDICINE

## 2025-03-06 ENCOUNTER — HOSPITAL ENCOUNTER (OUTPATIENT)
Dept: ULTRASOUND IMAGING | Facility: HOSPITAL | Age: 75
Discharge: HOME | End: 2025-03-06
Payer: COMMERCIAL

## 2025-03-06 ENCOUNTER — HOSPITAL ENCOUNTER (OUTPATIENT)
Dept: MAMMOGRAPHY | Facility: HOSPITAL | Age: 75
Discharge: HOME | End: 2025-03-06
Payer: COMMERCIAL

## 2025-03-06 DIAGNOSIS — R22.32 AXILLARY MASS, LEFT: ICD-10-CM

## 2025-03-06 DIAGNOSIS — Z85.3 HISTORY OF BREAST CANCER: ICD-10-CM

## 2025-03-06 PROCEDURE — G0279 TOMOSYNTHESIS, MAMMO: HCPCS

## 2025-03-06 PROCEDURE — 76642 ULTRASOUND BREAST LIMITED: CPT

## 2025-03-06 PROCEDURE — 77066 DX MAMMO INCL CAD BI: CPT

## 2025-03-10 ENCOUNTER — LAB (OUTPATIENT)
Dept: INTERNAL MEDICINE | Facility: CLINIC | Age: 75
End: 2025-03-10
Payer: COMMERCIAL

## 2025-03-10 ENCOUNTER — ANTICOAGULATION VISIT (OUTPATIENT)
Dept: INTERNAL MEDICINE | Facility: CLINIC | Age: 75
End: 2025-03-10

## 2025-03-10 ENCOUNTER — ANTICOAGULATION VISIT (OUTPATIENT)
Dept: INTERNAL MEDICINE | Facility: CLINIC | Age: 75
End: 2025-03-10
Payer: COMMERCIAL

## 2025-03-10 DIAGNOSIS — R74.8 ELEVATED LIVER ENZYMES: ICD-10-CM

## 2025-03-10 DIAGNOSIS — Z79.01 CHRONIC ANTICOAGULATION: Primary | Chronic | ICD-10-CM

## 2025-03-10 LAB
ALBUMIN SERPL-MCNC: 3.7 G/DL (ref 3.5–5.2)
ALP SERPL-CCNC: 95 U/L (ref 39–117)
ALT SERPL W P-5'-P-CCNC: 65 U/L (ref 1–33)
AST SERPL-CCNC: 73 U/L (ref 1–32)
BILIRUB CONJ SERPL-MCNC: 0.2 MG/DL (ref 0–0.3)
BILIRUB INDIRECT SERPL-MCNC: 0.5 MG/DL
BILIRUB SERPL-MCNC: 0.7 MG/DL (ref 0–1.2)
INR PPP: 3.4
PROT SERPL-MCNC: 7.5 G/DL (ref 6–8.5)

## 2025-03-10 PROCEDURE — 85610 PROTHROMBIN TIME: CPT | Performed by: INTERNAL MEDICINE

## 2025-03-10 PROCEDURE — 36415 COLL VENOUS BLD VENIPUNCTURE: CPT | Performed by: INTERNAL MEDICINE

## 2025-03-10 PROCEDURE — 36416 COLLJ CAPILLARY BLOOD SPEC: CPT | Performed by: INTERNAL MEDICINE

## 2025-03-10 PROCEDURE — 80076 HEPATIC FUNCTION PANEL: CPT | Performed by: INTERNAL MEDICINE

## 2025-04-02 ENCOUNTER — ANTICOAGULATION VISIT (OUTPATIENT)
Dept: INTERNAL MEDICINE | Facility: CLINIC | Age: 75
End: 2025-04-02
Payer: COMMERCIAL

## 2025-04-02 DIAGNOSIS — Z79.01 CHRONIC ANTICOAGULATION: Primary | ICD-10-CM

## 2025-04-02 LAB — INR PPP: 3

## 2025-04-02 PROCEDURE — 85610 PROTHROMBIN TIME: CPT | Performed by: INTERNAL MEDICINE

## 2025-04-02 PROCEDURE — 36416 COLLJ CAPILLARY BLOOD SPEC: CPT | Performed by: INTERNAL MEDICINE

## 2025-05-05 ENCOUNTER — ANTICOAGULATION VISIT (OUTPATIENT)
Dept: INTERNAL MEDICINE | Facility: CLINIC | Age: 75
End: 2025-05-05
Payer: COMMERCIAL

## 2025-05-05 ENCOUNTER — TELEPHONE (OUTPATIENT)
Dept: INTERNAL MEDICINE | Facility: CLINIC | Age: 75
End: 2025-05-05
Payer: COMMERCIAL

## 2025-05-05 LAB — INR PPP: 3.7

## 2025-05-05 NOTE — TELEPHONE ENCOUNTER
Name: Bailey Armstrong      Relationship: Self      Best Callback Number: [phone]       HUB PROVIDED THE RELAY MESSAGE FROM THE OFFICE      PATIENT: VOICED UNDERSTANDING AND HAS NO FURTHER QUESTIONS AT THIS TIME    ADDITIONAL INFORMATION: PATIENT STATES SHE WILL BE THERE IN ABOUT AN HOUR WHILE ON HER LUNCH.

## 2025-05-05 NOTE — TELEPHONE ENCOUNTER
Called and left message (ok per ADELITA) reminding her of INR needing to be completed. Office number given.      normal for race

## 2025-05-19 ENCOUNTER — TELEPHONE (OUTPATIENT)
Dept: INTERNAL MEDICINE | Facility: CLINIC | Age: 75
End: 2025-05-19
Payer: COMMERCIAL

## 2025-05-19 NOTE — TELEPHONE ENCOUNTER
Caller: Bailey Armstrong    Relationship: Self    Best call back number: [phone]     What orders are you requesting (i.e. lab or imaging): BLOOD WORK    In what timeframe would the patient need to come in: AS SOON AS POSSIBLE    Where will you receive your lab/imaging services: IN OFFICE      Additional notes: PATIENT HAS AN APPOINTMENT ON 05/22/25 AND WOULD LIKE TO DO ANY NEEDED BLOOD WORK AHEAD OF TIME AS WELL AS HER INR. PLEASE CALL PATIENT IF ORDERS ARE ENTERED.

## 2025-05-19 NOTE — PROGRESS NOTES
Chief Complaint  Diabetes (Last A1C 5/20/25 = 9.5) and Hyperlipidemia    Subjective          Bailey Armstrong presents to [facility]      DM type II-most recent A1c is up to 9.5 from 8.6.  She is currently on metformin 500 mg daily.  She has been very resistant to taking any additional medication for her diabetes as she has had reactions to medications through the years so she feels she is very sensitive to medications  She is exercising and eating healthy    Hyperlipidemia-patient has also declined statins    History of pulmonary embolism-her INR is 2.86    Elevated liver labs-AST and ALT are 58 and 41-down from previous.  Does have fatty liver, ultrasound done in 2023    Current Outpatient Medications:     clonazePAM (KlonoPIN) 0.5 MG tablet, Take 1 tablet by mouth Every Night., Disp: , Rfl:     Cyanocobalamin (VITAMIN B-12 PO), Take 1 tablet by mouth Daily., Disp: , Rfl:     glucose blood test strip, Use once daily, Disp: 100 each, Rfl: 3    glucose monitor monitoring kit, 1 each As Needed (once daily)., Disp: 1 each, Rfl: 0    hydroquinone 4 % cream, 1 Application Daily As Needed., Disp: , Rfl:     IRON PO, Take  by mouth Daily., Disp: , Rfl:     metFORMIN ER (GLUCOPHAGE-XR) 500 MG 24 hr tablet, 1 qd with food, Disp: 90 tablet, Rfl: 1    OLANZapine (zyPREXA) 2.5 MG tablet, Take 0.625 mg by mouth Every Night., Disp: , Rfl:     omeprazole (priLOSEC) 20 MG capsule, Take 1 capsule by mouth Daily., Disp: 90 capsule, Rfl: 3    PARoxetine (PAXIL) 20 MG tablet, Take 1 tablet by mouth Daily., Disp: , Rfl:     Vitamin D, Cholecalciferol, 50 MCG (2000 UT) capsule, Take 1 capsule by mouth Daily., Disp: , Rfl:     warfarin (COUMADIN) 5 MG tablet, Take 1 tablet by mouth Daily., Disp: 90 tablet, Rfl: 1    warfarin (COUMADIN) 7.5 MG tablet, TAKE 1 TABLET BY MOUTH EVERY DAY AS DIRECTED, Disp: 90 tablet, Rfl: 1    Tirzepatide 2.5 MG/0.5ML solution auto-injector, Inject 2.5 mg under the skin into the  "appropriate area as directed 1 (One) Time Per Week., Disp: 2 mL, Rfl: 0    triamcinolone (KENALOG) 0.1 % ointment, 1 Application Daily As Needed. (Patient not taking: Reported on 5/22/2025), Disp: , Rfl:    The following portions of the patient's history were reviewed and updated as appropriate: allergies, current medications, past family history, past medical history, past social history, past surgical history and problem list.     Objective   Vital Signs:   /76 (BP Location: Right arm, Patient Position: Sitting, Cuff Size: Adult)   Pulse 102   Temp 97.1 °F (36.2 °C) (Infrared)   Resp 20   Ht 157.5 cm (62\")   Wt 79.9 kg (176 lb 3.2 oz)   SpO2 96%   BMI 32.23 kg/m²       Physical exam  Constitutional: oriented to person, place, and time.  well-developed and well-nourished. No distress.   HENT:   Head: Normocephalic and atraumatic.   Eyes: Conjunctivae and EOM are normal.   Cardiovascular: Normal rate, regular rhythm and normal heart sounds.  Exam reveals no gallop and no friction rub.    No murmur heard.  Pulmonary/Chest: Effort normal and breath sounds normal. No respiratory distress.   no wheezes.   Neurological:  alert and oriented to person, place, and time.   Abdomen: Soft, nontender nondistended  Skin: Skin is warm and dry. not diaphoretic.   Psychiatric:  normal mood and affect. behavior is normal. Judgment and thought content normal.      Physical Exam     Result Review :                    Lab Results   Component Value Date    WBC 5.80 05/20/2025    HGB 12.8 05/20/2025    HCT 38.8 05/20/2025    MCV 91.9 05/20/2025     05/20/2025     Lab Results   Component Value Date    GLUCOSE 220 (H) 05/20/2025    BUN 10 05/20/2025    CREATININE 0.75 05/20/2025     05/20/2025    K 4.2 05/20/2025     05/20/2025    CALCIUM 9.5 05/20/2025    PROTEINTOT 7.8 05/20/2025    ALBUMIN 4.1 05/20/2025    ALT 41 (H) 05/20/2025    AST 58 (H) 05/20/2025    ALKPHOS 111 05/20/2025    BILITOT 0.9 05/20/2025 " "   GLOB 3.7 05/20/2025    AGRATIO 1.1 05/20/2025    BCR 13.3 05/20/2025    ANIONGAP 12.6 05/20/2025    EGFR 82.6 05/20/2025     Lab Results   Component Value Date    CHOL 212 (H) 05/20/2025    TRIG 189 (H) 05/20/2025    HDL 41 05/20/2025     (H) 05/20/2025     Lab Results   Component Value Date    HGBA1C 9.50 (H) 05/20/2025     Lab Results   Component Value Date    TSH 2.280 02/19/2025     No results found for: \"SAXP916\"         Assessment and Plan      Diagnoses and all orders for this visit:    1. Uncontrolled type 2 diabetes mellitus with hyperglycemia, without long-term current use of insulin (Primary)- she is willing to try mounjaro. We will send in.  No personal history of pancreatitis and no family history of medullary thyroid cancer.  We reviewed side effects and what to expect.  I asked her to call monthly and we can raise dose as tolerated.  We will continue metformin for now but if she responds well to Mounjaro we may be able to stop the metformin.  Recheck A1c in 3 months  -     Hemoglobin A1c; Future  -     Tirzepatide 2.5 MG/0.5ML solution auto-injector; Inject 2.5 mg under the skin into the appropriate area as directed 1 (One) Time Per Week.  Dispense: 2 mL; Refill: 0  -     metFORMIN ER (GLUCOPHAGE-XR) 500 MG 24 hr tablet; 1 qd with food  Dispense: 90 tablet; Refill: 1    2. Pure hypercholesterolemia-she again declines a statin.  Discussed reasoning behind statins in patients with diabetes  -     Comprehensive Metabolic Panel; Future  -     Lipid Panel; Future  -     CBC (No Diff); Future    3. Chronic anticoagulation-INR is therapeutic.  Continue same dose of warfarin and recheck in 4 weeks  -     Protime-INR; Future    4. Fatty liver-liver labs show slight improvement.  Continue working on healthy lifestyle    Prev:  She declines covid booster  She declines RSV      Follow Up   Return in about 3 months (around 8/22/2025) for Next scheduled follow up.  Patient was given instructions and " counseling regarding her condition or for health maintenance advice. Please see specific information pulled into the AVS if appropriate.

## 2025-05-20 ENCOUNTER — LAB (OUTPATIENT)
Dept: INTERNAL MEDICINE | Facility: CLINIC | Age: 75
End: 2025-05-20
Payer: COMMERCIAL

## 2025-05-20 DIAGNOSIS — Z79.01 CHRONIC ANTICOAGULATION: Chronic | ICD-10-CM

## 2025-05-20 DIAGNOSIS — E78.00 PURE HYPERCHOLESTEROLEMIA: Chronic | ICD-10-CM

## 2025-05-20 DIAGNOSIS — E11.9 CONTROLLED TYPE 2 DIABETES MELLITUS WITHOUT COMPLICATION, WITHOUT LONG-TERM CURRENT USE OF INSULIN: Chronic | ICD-10-CM

## 2025-05-20 LAB
DEPRECATED RDW RBC AUTO: 45.7 FL (ref 37–54)
ERYTHROCYTE [DISTWIDTH] IN BLOOD BY AUTOMATED COUNT: 13.4 % (ref 12.3–15.4)
HBA1C MFR BLD: 9.5 % (ref 4.8–5.6)
HCT VFR BLD AUTO: 38.8 % (ref 34–46.6)
HGB BLD-MCNC: 12.8 G/DL (ref 12–15.9)
INR PPP: 2.86 (ref 0.89–1.12)
MCH RBC QN AUTO: 30.3 PG (ref 26.6–33)
MCHC RBC AUTO-ENTMCNC: 33 G/DL (ref 31.5–35.7)
MCV RBC AUTO: 91.9 FL (ref 79–97)
PLATELET # BLD AUTO: 282 10*3/MM3 (ref 140–450)
PMV BLD AUTO: 11.1 FL (ref 6–12)
PROTHROMBIN TIME: 32 SECONDS (ref 12.2–15.3)
RBC # BLD AUTO: 4.22 10*6/MM3 (ref 3.77–5.28)
WBC NRBC COR # BLD AUTO: 5.8 10*3/MM3 (ref 3.4–10.8)

## 2025-05-20 PROCEDURE — 80061 LIPID PANEL: CPT | Performed by: INTERNAL MEDICINE

## 2025-05-20 PROCEDURE — 36415 COLL VENOUS BLD VENIPUNCTURE: CPT | Performed by: INTERNAL MEDICINE

## 2025-05-20 PROCEDURE — 83036 HEMOGLOBIN GLYCOSYLATED A1C: CPT | Performed by: INTERNAL MEDICINE

## 2025-05-20 PROCEDURE — 85610 PROTHROMBIN TIME: CPT | Performed by: INTERNAL MEDICINE

## 2025-05-20 PROCEDURE — 85027 COMPLETE CBC AUTOMATED: CPT | Performed by: INTERNAL MEDICINE

## 2025-05-20 PROCEDURE — 80053 COMPREHEN METABOLIC PANEL: CPT | Performed by: INTERNAL MEDICINE

## 2025-05-21 LAB
ALBUMIN SERPL-MCNC: 4.1 G/DL (ref 3.5–5.2)
ALBUMIN/GLOB SERPL: 1.1 G/DL
ALP SERPL-CCNC: 111 U/L (ref 39–117)
ALT SERPL W P-5'-P-CCNC: 41 U/L (ref 1–33)
ANION GAP SERPL CALCULATED.3IONS-SCNC: 12.6 MMOL/L (ref 5–15)
AST SERPL-CCNC: 58 U/L (ref 1–32)
BILIRUB SERPL-MCNC: 0.9 MG/DL (ref 0–1.2)
BUN SERPL-MCNC: 10 MG/DL (ref 8–23)
BUN/CREAT SERPL: 13.3 (ref 7–25)
CALCIUM SPEC-SCNC: 9.5 MG/DL (ref 8.6–10.5)
CHLORIDE SERPL-SCNC: 100 MMOL/L (ref 98–107)
CHOLEST SERPL-MCNC: 212 MG/DL (ref 0–200)
CO2 SERPL-SCNC: 25.4 MMOL/L (ref 22–29)
CREAT SERPL-MCNC: 0.75 MG/DL (ref 0.57–1)
EGFRCR SERPLBLD CKD-EPI 2021: 82.6 ML/MIN/1.73
GLOBULIN UR ELPH-MCNC: 3.7 GM/DL
GLUCOSE SERPL-MCNC: 220 MG/DL (ref 65–99)
HDLC SERPL-MCNC: 41 MG/DL (ref 40–60)
LDLC SERPL CALC-MCNC: 137 MG/DL (ref 0–100)
LDLC/HDLC SERPL: 3.25 {RATIO}
POTASSIUM SERPL-SCNC: 4.2 MMOL/L (ref 3.5–5.2)
PROT SERPL-MCNC: 7.8 G/DL (ref 6–8.5)
SODIUM SERPL-SCNC: 138 MMOL/L (ref 136–145)
TRIGL SERPL-MCNC: 189 MG/DL (ref 0–150)
VLDLC SERPL-MCNC: 34 MG/DL (ref 5–40)

## 2025-05-22 ENCOUNTER — OFFICE VISIT (OUTPATIENT)
Dept: INTERNAL MEDICINE | Facility: CLINIC | Age: 75
End: 2025-05-22
Payer: COMMERCIAL

## 2025-05-22 VITALS
RESPIRATION RATE: 20 BRPM | TEMPERATURE: 97.1 F | WEIGHT: 176.2 LBS | HEART RATE: 102 BPM | HEIGHT: 62 IN | SYSTOLIC BLOOD PRESSURE: 112 MMHG | OXYGEN SATURATION: 96 % | BODY MASS INDEX: 32.42 KG/M2 | DIASTOLIC BLOOD PRESSURE: 76 MMHG

## 2025-05-22 DIAGNOSIS — K76.0 FATTY LIVER: ICD-10-CM

## 2025-05-22 DIAGNOSIS — E78.00 PURE HYPERCHOLESTEROLEMIA: Chronic | ICD-10-CM

## 2025-05-22 DIAGNOSIS — E11.65 UNCONTROLLED TYPE 2 DIABETES MELLITUS WITH HYPERGLYCEMIA, WITHOUT LONG-TERM CURRENT USE OF INSULIN: Primary | ICD-10-CM

## 2025-05-22 DIAGNOSIS — Z79.01 CHRONIC ANTICOAGULATION: Chronic | ICD-10-CM

## 2025-05-22 RX ORDER — BLOOD-GLUCOSE METER
1 KIT MISCELLANEOUS AS NEEDED
Qty: 1 EACH | Refills: 0 | Status: SHIPPED | OUTPATIENT
Start: 2025-05-22

## 2025-05-22 RX ORDER — METFORMIN HYDROCHLORIDE 500 MG/1
TABLET, EXTENDED RELEASE ORAL
Qty: 90 TABLET | Refills: 1 | Status: SHIPPED | OUTPATIENT
Start: 2025-05-22

## 2025-05-27 ENCOUNTER — PRIOR AUTHORIZATION (OUTPATIENT)
Dept: INTERNAL MEDICINE | Facility: CLINIC | Age: 75
End: 2025-05-27
Payer: COMMERCIAL

## 2025-05-27 NOTE — TELEPHONE ENCOUNTER
PA submitted through CoverMyMeds for Mounjaro (Tirzepatide) 2.5 MG/0.5ML    Key: [id number]    The authorization is valid from 04/27/2025 through 05/27/2026    Pharmacy has been notified

## 2025-06-04 NOTE — TELEPHONE ENCOUNTER
----- Message from Miriam Buenrostro MD sent at 9/29/2021 10:35 AM EDT -----      ----- Message -----  From: Odalys Huizar MA  Sent: 9/29/2021  10:29 AM EDT  To: Miriam Buenrostro MD    INR today is 3.0 she is taking 5 mg x 4 days and 7.5 mg x 3 days.   danger to self; mental illness expected to respond to inpatient care

## 2025-07-01 ENCOUNTER — CLINICAL SUPPORT (OUTPATIENT)
Dept: INTERNAL MEDICINE | Facility: CLINIC | Age: 75
End: 2025-07-01
Payer: COMMERCIAL

## 2025-07-01 DIAGNOSIS — Z79.01 CHRONIC ANTICOAGULATION: Primary | Chronic | ICD-10-CM

## 2025-07-01 LAB — INR PPP: 3.8 (ref 0.9–1.1)

## 2025-07-01 PROCEDURE — 85610 PROTHROMBIN TIME: CPT | Performed by: INTERNAL MEDICINE

## 2025-07-01 PROCEDURE — 36416 COLLJ CAPILLARY BLOOD SPEC: CPT | Performed by: INTERNAL MEDICINE

## 2025-07-08 ENCOUNTER — TELEPHONE (OUTPATIENT)
Dept: INTERNAL MEDICINE | Facility: CLINIC | Age: 75
End: 2025-07-08
Payer: COMMERCIAL

## 2025-07-08 DIAGNOSIS — E11.65 UNCONTROLLED TYPE 2 DIABETES MELLITUS WITH HYPERGLYCEMIA, WITHOUT LONG-TERM CURRENT USE OF INSULIN: Primary | ICD-10-CM

## 2025-07-08 RX ORDER — ACYCLOVIR 400 MG/1
1 TABLET ORAL ONCE
Qty: 1 EACH | Refills: 0 | Status: SHIPPED | OUTPATIENT
Start: 2025-07-08 | End: 2025-07-08

## 2025-07-08 RX ORDER — ACYCLOVIR 400 MG/1
1 TABLET ORAL
Qty: 3 EACH | Refills: 11 | Status: SHIPPED | OUTPATIENT
Start: 2025-07-08

## 2025-07-08 NOTE — TELEPHONE ENCOUNTER
Left message on voicemail letting patient know the script has been sent to the pharmacy.  The pharmacy will let her know if it is covered by her insurance.

## 2025-07-08 NOTE — TELEPHONE ENCOUNTER
Caller: Bailey Armstrong    Relationship: Self    Best call back number: [phone]    What was the call regarding: PATIENT WOULD LIKE TO KNOW IF A DEXCOM NEEDS TO BE PRESCRIBED BY A DOCTOR AND IF THE INSURANCE WILL COVER IT. IF SO, CAN SHE HAVE IT SENT OVER TO [facility] #[id number] - [place], [place] - [address] AT [facility] [address] [facility] - [phone]  - [phone] FX

## 2025-07-14 DIAGNOSIS — E11.65 UNCONTROLLED TYPE 2 DIABETES MELLITUS WITH HYPERGLYCEMIA, WITHOUT LONG-TERM CURRENT USE OF INSULIN: ICD-10-CM

## 2025-07-14 RX ORDER — METFORMIN HYDROCHLORIDE 500 MG/1
TABLET, EXTENDED RELEASE ORAL
Qty: 90 TABLET | Refills: 1 | OUTPATIENT
Start: 2025-07-14

## 2025-07-14 NOTE — TELEPHONE ENCOUNTER
Caller: aNthaniel Bailey WYNN    Relationship: Self    Best call back number: [phone]     Requested Prescriptions:   Requested Prescriptions     Pending Prescriptions Disp Refills    metFORMIN ER (GLUCOPHAGE-XR) 500 MG 24 hr tablet 90 tablet 1     Si qd with food        Pharmacy where request should be sent: [id number] [place], [place] - [address] AT [facility] [address] [facility] [phone] [facility] [phone] FX     Last office visit with prescribing clinician: 2025   Last telemedicine visit with prescribing clinician: Visit date not found   Next office visit with prescribing clinician: 2025     Additional details provided by patient:     Does the patient have less than a 3 day supply:  [] Yes  [x] No    Would you like a call back once the refill request has been completed: [] Yes [x] No    If the office needs to give you a call back, can they leave a voicemail: [] Yes [x] No    Cadance Dunaway, RegSched Rep   25 14:21 EDT

## 2025-07-18 ENCOUNTER — ANTICOAGULATION VISIT (OUTPATIENT)
Dept: INTERNAL MEDICINE | Facility: CLINIC | Age: 75
End: 2025-07-18
Payer: COMMERCIAL

## 2025-07-18 DIAGNOSIS — Z79.01 CHRONIC ANTICOAGULATION: Primary | ICD-10-CM

## 2025-07-18 LAB — INR PPP: 3.3 (ref 0.9–1.1)

## 2025-07-18 PROCEDURE — 36416 COLLJ CAPILLARY BLOOD SPEC: CPT | Performed by: INTERNAL MEDICINE

## 2025-07-18 PROCEDURE — 85610 PROTHROMBIN TIME: CPT | Performed by: INTERNAL MEDICINE

## 2025-08-28 ENCOUNTER — ANTICOAGULATION VISIT (OUTPATIENT)
Dept: INTERNAL MEDICINE | Facility: CLINIC | Age: 75
End: 2025-08-28

## 2025-08-28 ENCOUNTER — OFFICE VISIT (OUTPATIENT)
Dept: INTERNAL MEDICINE | Facility: CLINIC | Age: 75
End: 2025-08-28
Payer: COMMERCIAL

## 2025-08-28 VITALS
HEART RATE: 63 BPM | TEMPERATURE: 97.3 F | RESPIRATION RATE: 20 BRPM | DIASTOLIC BLOOD PRESSURE: 80 MMHG | WEIGHT: 172.2 LBS | OXYGEN SATURATION: 98 % | BODY MASS INDEX: 31.69 KG/M2 | HEIGHT: 62 IN | SYSTOLIC BLOOD PRESSURE: 112 MMHG

## 2025-08-28 DIAGNOSIS — E11.65 UNCONTROLLED TYPE 2 DIABETES MELLITUS WITH HYPERGLYCEMIA: Primary | Chronic | ICD-10-CM

## 2025-08-28 DIAGNOSIS — E78.00 PURE HYPERCHOLESTEROLEMIA: Chronic | ICD-10-CM

## 2025-08-28 DIAGNOSIS — Z79.01 CHRONIC ANTICOAGULATION: Chronic | ICD-10-CM

## 2025-08-28 DIAGNOSIS — Z79.01 CHRONIC ANTICOAGULATION: Primary | Chronic | ICD-10-CM

## 2025-08-28 LAB
EXPIRATION DATE: ABNORMAL
HBA1C MFR BLD: 8.5 % (ref 4.5–5.7)
INR PPP: 3.2 (ref 2–3)
Lab: ABNORMAL

## 2025-08-28 PROCEDURE — 36416 COLLJ CAPILLARY BLOOD SPEC: CPT | Performed by: INTERNAL MEDICINE

## 2025-08-28 PROCEDURE — 85610 PROTHROMBIN TIME: CPT | Performed by: INTERNAL MEDICINE

## (undated) DEVICE — SUT MNCRYL PLS ANTIB UD 4/0 PS2 18IN

## (undated) DEVICE — PK MINOR SPLT 10

## (undated) DEVICE — 3M™ STERI-STRIP™ REINFORCED ADHESIVE SKIN CLOSURES, R1547, 1/2 IN X 4 IN (12 MM X 100 MM), 6 STRIPS/ENVELOPE: Brand: 3M™ STERI-STRIP™

## (undated) DEVICE — GLV SURG TRIUMPH ORTHO W/ALOE PF LTX 7.5 STRL

## (undated) DEVICE — MMIS - INTRODUCER SHTH 6FR 50CM 11CM GRN HUB SNPFT DIL

## (undated) DEVICE — MMIS - KIT US 48X6IN FLAT PROBE CVR ELASTIC GEL STRL LF

## (undated) DEVICE — SYR CONTRL LUERLOK 10CC

## (undated) DEVICE — DEV TRANSPEC W/PERF COMP PLT

## (undated) DEVICE — MMIS - PW .035/145/3MM J WIRE

## (undated) DEVICE — ANTIBACTERIAL UNDYED BRAIDED (POLYGLACTIN 910), SYNTHETIC ABSORBABLE SUTURE: Brand: COATED VICRYL

## (undated) DEVICE — DRSNG TELFA PAD NONADH STR 1S 3X4IN

## (undated) DEVICE — ELECTRD NDL EDGE/INSUL/PFTE.787MM 2.84IN

## (undated) DEVICE — Device

## (undated) DEVICE — COVER,LIGHT HANDLE,FLX,1/PK: Brand: MEDLINE INDUSTRIES, INC.

## (undated) DEVICE — SKIN AFFIX SURG ADHESIVE 72/CS 0.55ML: Brand: MEDLINE

## (undated) DEVICE — APPL CHLORAPREP W/TINT 26ML BLU

## (undated) DEVICE — DRSNG SURESITE WNDW 4X4.5

## (undated) DEVICE — GAUZE FLUFF 1 PLY: Brand: DEROYAL

## (undated) DEVICE — SUT SILK 2/0 PS 18IN 1588H

## (undated) DEVICE — MMIS - KIT INTRO 4FR 21GA 10CM 7CM .018IN STF DIL NTNL GW